# Patient Record
Sex: FEMALE | Race: WHITE | NOT HISPANIC OR LATINO | Employment: OTHER | URBAN - METROPOLITAN AREA
[De-identification: names, ages, dates, MRNs, and addresses within clinical notes are randomized per-mention and may not be internally consistent; named-entity substitution may affect disease eponyms.]

---

## 2017-06-23 ENCOUNTER — ALLSCRIPTS OFFICE VISIT (OUTPATIENT)
Dept: OTHER | Facility: OTHER | Age: 72
End: 2017-06-23

## 2017-06-23 DIAGNOSIS — R06.02 SHORTNESS OF BREATH: ICD-10-CM

## 2018-01-13 VITALS
SYSTOLIC BLOOD PRESSURE: 120 MMHG | OXYGEN SATURATION: 97 % | HEIGHT: 65 IN | DIASTOLIC BLOOD PRESSURE: 74 MMHG | HEART RATE: 101 BPM | BODY MASS INDEX: 22.87 KG/M2 | WEIGHT: 137.25 LBS

## 2018-04-16 ENCOUNTER — TELEPHONE (OUTPATIENT)
Dept: CARDIOLOGY CLINIC | Facility: CLINIC | Age: 73
End: 2018-04-16

## 2018-04-16 NOTE — TELEPHONE ENCOUNTER
PT WAS [facility] FOR A STRESS TEST IN JUNE OF 2017 & HAVENT DONE IT B/C SHE HAD A LOT OF HEALTH ISSUES BUT IS NOW READY TO TAKE THE TEST  DOES DR DAVE WANT TO SEE PT BEFORE HE RE-ORDERS THE TEST OR WAIT TIL AFTER SHE TAKES THE TEST?   PT HAS NEW INSUR  MAIL HANDLERS (Dewayne Smaller PLAN)  DJ#[id number]  PHONE #[phone]  PLEASE CALL PT TO GET THE TEST [facility] [address] ONCE ITS BEEN Everrett Vera

## 2018-04-27 RX ORDER — OXYBUTYNIN CHLORIDE 10 MG/1
2 TABLET, EXTENDED RELEASE ORAL
COMMUNITY
Start: 2014-01-28 | End: 2020-07-09 | Stop reason: SDUPTHER

## 2018-04-27 RX ORDER — METOPROLOL TARTRATE 50 MG/1
TABLET, FILM COATED ORAL
COMMUNITY
Start: 2014-01-28 | End: 2018-05-03 | Stop reason: SDUPTHER

## 2018-04-27 RX ORDER — CYCLOBENZAPRINE HCL 5 MG
5 TABLET ORAL
COMMUNITY
Start: 2014-01-28 | End: 2020-07-09 | Stop reason: SDUPTHER

## 2018-04-27 RX ORDER — LISINOPRIL 2.5 MG/1
1 TABLET ORAL DAILY
COMMUNITY
Start: 2014-01-28 | End: 2018-06-09 | Stop reason: SDUPTHER

## 2018-04-27 RX ORDER — NITROGLYCERIN 0.4 MG/1
TABLET SUBLINGUAL
COMMUNITY
Start: 2014-01-28 | End: 2021-11-17 | Stop reason: ALTCHOICE

## 2018-04-27 RX ORDER — DIGOXIN 125 MCG
1 TABLET ORAL DAILY
COMMUNITY
Start: 2015-03-02 | End: 2018-07-06 | Stop reason: SDUPTHER

## 2018-04-27 RX ORDER — MELOXICAM 15 MG/1
TABLET ORAL
COMMUNITY
End: 2019-12-12

## 2018-04-27 RX ORDER — LEVOTHYROXINE SODIUM 88 UG/1
TABLET ORAL DAILY
COMMUNITY
Start: 2014-01-28 | End: 2020-07-20 | Stop reason: SDUPTHER

## 2018-04-27 RX ORDER — LOTEPREDNOL ETABONATE 5 MG/ML
SUSPENSION/ DROPS OPHTHALMIC
COMMUNITY
End: 2021-11-17 | Stop reason: ALTCHOICE

## 2018-04-27 RX ORDER — PANTOPRAZOLE SODIUM 40 MG/1
1 TABLET, DELAYED RELEASE ORAL 2 TIMES DAILY
COMMUNITY
Start: 2014-01-28 | End: 2019-07-29 | Stop reason: SDUPTHER

## 2018-04-27 RX ORDER — LEVOCETIRIZINE DIHYDROCHLORIDE 5 MG/1
1 TABLET, FILM COATED ORAL
COMMUNITY
Start: 2014-01-28 | End: 2020-07-09 | Stop reason: SDUPTHER

## 2018-04-27 RX ORDER — DICYCLOMINE HYDROCHLORIDE 10 MG/1
1 CAPSULE ORAL EVERY 6 HOURS PRN
COMMUNITY
Start: 2017-06-23 | End: 2020-07-09 | Stop reason: SDUPTHER

## 2018-05-03 ENCOUNTER — OFFICE VISIT (OUTPATIENT)
Dept: CARDIOLOGY CLINIC | Facility: CLINIC | Age: 73
End: 2018-05-03
Payer: MEDICARE

## 2018-05-03 VITALS
HEIGHT: 65 IN | OXYGEN SATURATION: 97 % | DIASTOLIC BLOOD PRESSURE: 78 MMHG | SYSTOLIC BLOOD PRESSURE: 142 MMHG | WEIGHT: 136.4 LBS | HEART RATE: 110 BPM | BODY MASS INDEX: 22.73 KG/M2

## 2018-05-03 DIAGNOSIS — R07.9 CHEST PAIN, UNSPECIFIED TYPE: ICD-10-CM

## 2018-05-03 DIAGNOSIS — I48.20 CHRONIC A-FIB (HCC): Primary | ICD-10-CM

## 2018-05-03 DIAGNOSIS — I48.91 ATRIAL FIBRILLATION, UNSPECIFIED TYPE (HCC): Primary | ICD-10-CM

## 2018-05-03 DIAGNOSIS — I10 HYPERTENSION, UNSPECIFIED TYPE: ICD-10-CM

## 2018-05-03 DIAGNOSIS — I42.9 CARDIOMYOPATHY, UNSPECIFIED TYPE (HCC): ICD-10-CM

## 2018-05-03 DIAGNOSIS — E78.5 HYPERLIPIDEMIA, UNSPECIFIED HYPERLIPIDEMIA TYPE: ICD-10-CM

## 2018-05-03 PROCEDURE — 99214 OFFICE O/P EST MOD 30 MIN: CPT | Performed by: INTERNAL MEDICINE

## 2018-05-03 RX ORDER — METOPROLOL TARTRATE 50 MG/1
TABLET, FILM COATED ORAL
Qty: 90 TABLET | Refills: 5 | Status: SHIPPED | OUTPATIENT
Start: 2018-05-03 | End: 2018-10-28 | Stop reason: SDUPTHER

## 2018-05-03 RX ORDER — APIXABAN 5 MG/1
TABLET, FILM COATED ORAL
Qty: 60 TABLET | Refills: 3 | Status: SHIPPED | OUTPATIENT
Start: 2018-05-03 | End: 2018-09-30 | Stop reason: SDUPTHER

## 2018-05-03 RX ORDER — METOPROLOL TARTRATE 50 MG/1
50 TABLET, FILM COATED ORAL EVERY 12 HOURS SCHEDULED
Qty: 270 TABLET | Refills: 3 | Status: SHIPPED | OUTPATIENT
Start: 2018-05-03 | End: 2018-11-15

## 2018-05-03 RX ORDER — EZETIMIBE AND SIMVASTATIN 10; 20 MG/1; MG/1
1 TABLET ORAL
Refills: 0 | COMMUNITY
Start: 2018-04-12 | End: 2020-07-09 | Stop reason: SDUPTHER

## 2018-05-03 NOTE — PROGRESS NOTES
[facility] [address]  [facility] [address]  Cardiology Consultation     Raymundo Macias  [id number]  1945      1  Atrial fibrillation, unspecified type ([facility] )     2  Cardiomyopathy, unspecified type ([facility] )     3  Hypertension, unspecified type     4  Hyperlipidemia, unspecified hyperlipidemia type         Chief Complaint   Patient presents with    Follow-up       HPI:  Patient with history of A Fib on Eliquis, cardiomyopathy, HTN, HLD presents for follow up visit  Did not have stress test done  Denies chest pain, SOB, lightheadedness, leg swelling, syncope  HR elevated today  States that she occasionally gets palpitations  She also admits to fatigue and a dull chest pain, nonexertional  Compliant on medications  There is no problem list on file for this patient  Past Medical History:   Diagnosis Date    Atrial fibrillation ([facility] )     Cardiomyopathy ([facility] )     Diabetes mellitus ([facility] )     HTN (hypertension)     Hyperlipidemia     MVP (mitral valve prolapse)     SOB (shortness of breath)     Tachycardia      Social History     Social History    Marital status: /Civil Union     Spouse name: N/A    Number of children: N/A    Years of education: N/A     Occupational History    Not on file  Social History Main Topics    Smoking status: Never Smoker    Smokeless tobacco: Never Used    Alcohol use No    Drug use: Unknown    Sexual activity: Not on file     Other Topics Concern    Not on file     Social History Narrative    No narrative on file      History reviewed  No pertinent family history    Past Surgical History:   Procedure Laterality Date    WRIST SURGERY         Current Outpatient Prescriptions:     apixaban (ELIQUIS) 5 mg, Take 1 tablet by mouth 2 (two) times a day, Disp: , Rfl:     cyclobenzaprine (FLEXERIL) 5 mg tablet, Take by mouth, Disp: , Rfl:     dicyclomine (BENTYL) 10 mg capsule, Take 1 capsule by mouth every 6 (six) hours as needed, Disp: , Rfl:     digoxin (DIGITEK) 0 125 mg tablet, Take 1 tablet by mouth daily, Disp: , Rfl:     ezetimibe-simvastatin (VYTORIN) 10-20 mg per tablet, Take 1 tablet by mouth daily at bedtime  , Disp: , Rfl: 0    levocetirizine (XYZAL) 5 MG tablet, Take 1 tablet by mouth, Disp: , Rfl:     levothyroxine 88 mcg tablet, Take by mouth daily, Disp: , Rfl:     lisinopril (ZESTRIL) 2 5 mg tablet, Take 1 tablet by mouth daily, Disp: , Rfl:     metFORMIN (GLUCOPHAGE) 500 mg tablet, Take 2 tablets by mouth 2 (two) times a day  , Disp: , Rfl:     metoprolol tartrate (LOPRESSOR) 50 mg tablet, Take by mouth, Disp: , Rfl:     Multiple Vitamins-Minerals (CENTRUM SILVER PO), Take 1 tablet by mouth daily, Disp: , Rfl:     oxybutynin (DITROPAN-XL) 10 MG 24 hr tablet, Take 2 tablets by mouth, Disp: , Rfl:     pantoprazole (PROTONIX) 40 mg tablet, Take 1 tablet by mouth 2 (two) times a day, Disp: , Rfl:     diclofenac sodium (VOLTAREN) 1 %, Place on the skin, Disp: , Rfl:     Linaclotide (LINZESS) 145 MCG CAPS, Take by mouth every 8 (eight) hours, Disp: , Rfl:     loteprednol etabonate (LOTEMAX) 0 5 % ophthalmic suspension, Apply to eye, Disp: , Rfl:     meloxicam (MOBIC) 15 mg tablet, Take by mouth, Disp: , Rfl:     nitroglycerin (NITROSTAT) 0 4 mg SL tablet, Place under the tongue, Disp: , Rfl:     terconazole (TERAZOL 7) 0 4 % vaginal cream, Insert into the vagina, Disp: , Rfl:   Allergies   Allergen Reactions    Butoconazole     Moxifloxacin     Neomycin-Bacitracin Zn-Polymyx     Smz-Tmp Ds [Sulfamethoxazole-Trimethoprim]     Telithromycin Diarrhea     Vitals:    05/03/18 1338   BP: 142/78   Pulse: (!) 110   SpO2: 97%   Weight: 61 9 kg (136 lb 6 4 oz)   Height: 5' 5" (1 651 m)       Labs:  No visits with results within 2 Month(s) from this visit  Latest known visit with results is:   No results found for any previous visit  Imaging: No results found      Review of Systems:  Review of Systems Constitutional: Negative for diaphoresis, fatigue and fever  HENT: Negative for congestion, ear discharge, hearing loss, sinus pain and sore throat  Eyes: Negative for pain, redness and visual disturbance  Respiratory: Negative for cough, chest tightness, shortness of breath and wheezing  Cardiovascular: Positive for chest pain and palpitations  Negative for leg swelling  Gastrointestinal: Negative for abdominal distention, abdominal pain, constipation, diarrhea, nausea and vomiting  Endocrine: Negative for cold intolerance and heat intolerance  Genitourinary: Negative for difficulty urinating and hematuria  Musculoskeletal: Negative for arthralgias, back pain, gait problem, joint swelling, myalgias, neck pain and neck stiffness  Skin: Negative for color change, pallor, rash and wound  Neurological: Negative for dizziness, syncope, weakness, numbness and headaches  Hematological: Does not bruise/bleed easily  Psychiatric/Behavioral: Negative for agitation, behavioral problems and confusion  The patient is not nervous/anxious  /78   Pulse (!) 110   Ht 5' 5" (1 651 m)   Wt 61 9 kg (136 lb 6 4 oz)   SpO2 97%   BMI 22 70 kg/m²     Physical Exam:  Physical Exam   Constitutional: She is oriented to person, place, and time  She appears well-developed and well-nourished  HENT:   Head: Normocephalic and atraumatic  Eyes: Conjunctivae and EOM are normal  Pupils are equal, round, and reactive to light  Neck: Normal range of motion  Neck supple  Cardiovascular: Normal rate, regular rhythm, normal heart sounds and intact distal pulses  Exam reveals no gallop and no friction rub  No murmur heard  Pulmonary/Chest: Effort normal and breath sounds normal  No respiratory distress  She has no wheezes  She has no rales  She exhibits no tenderness  Abdominal: Soft  Bowel sounds are normal  She exhibits no distension  There is no rebound     Musculoskeletal: Normal range of motion  She exhibits no edema  Neurological: She is alert and oriented to person, place, and time  She has normal reflexes  Skin: Skin is warm and dry  Psychiatric: She has a normal mood and affect  Her behavior is normal  Judgment and thought content normal        Discussion/Summary:  1  Chest pain, fatigue:  -Will order stress test to r/o coronary ischemia   -Continue vytorin, lopressor  2  A Fib: On Digoxin and Lopressor for rate control  HR today 110  Advised patient to take 75 mg BID of lopressor (1 5 tablets twice a day)  On Eliquis for anticoagulation  3  Cardiomyopathy:  -ECHO in 2016 showed EF 55-65%, no regional wall motion abnormalities, mild mitral annular calcification, mild MR, mild TR, moderate fibrocalcific change of aorta  -Continue lisinopril, lopressor  Will increase lopressor dose as above (1 5 tablets twice a day)    4  HTN: Stable, continue present regimen  Will increase lopressor as above  5  HLD: Continue Vytorin    F/u 6 months

## 2018-05-07 ENCOUNTER — TELEPHONE (OUTPATIENT)
Dept: CARDIOLOGY CLINIC | Facility: CLINIC | Age: 73
End: 2018-05-07

## 2018-05-07 NOTE — TELEPHONE ENCOUNTER
Please advise, the metoprolol instructions first state take 1 tab q 12hrs then it say take 1 and 1/2 tabs bid  Which should she be taking?

## 2018-05-18 ENCOUNTER — HOSPITAL ENCOUNTER (OUTPATIENT)
Dept: NON INVASIVE DIAGNOSTICS | Facility: CLINIC | Age: 73
Discharge: HOME | End: 2018-05-18
Payer: MEDICARE

## 2018-05-18 DIAGNOSIS — R07.9 CHEST PAIN, UNSPECIFIED TYPE: ICD-10-CM

## 2018-05-18 LAB
MAX DIASTOLIC BP: 74 MMHG
MAX HEART RATE: 137 BPM
MAX PREDICTED HEART RATE: 148 BPM
MAX. SYSTOLIC BP: 136 MMHG
PROTOCOL NAME: NORMAL
REASON FOR TERMINATION: NORMAL
TARGET HR FORMULA: NORMAL
TEST INDICATION: NORMAL
TIME IN EXERCISE PHASE: NORMAL

## 2018-05-18 PROCEDURE — 78452 HT MUSCLE IMAGE SPECT MULT: CPT

## 2018-05-18 PROCEDURE — 93017 CV STRESS TEST TRACING ONLY: CPT

## 2018-05-18 PROCEDURE — A9502 TC99M TETROFOSMIN: HCPCS

## 2018-05-18 RX ORDER — AMINOPHYLLINE DIHYDRATE 25 MG/ML
50 INJECTION, SOLUTION INTRAVENOUS ONCE
Status: CANCELLED | OUTPATIENT
Start: 2018-05-18 | End: 2018-05-18

## 2018-05-18 RX ADMIN — REGADENOSON 0.4 MG: 0.08 INJECTION, SOLUTION INTRAVENOUS at 13:26

## 2018-05-21 ENCOUNTER — TELEPHONE (OUTPATIENT)
Dept: CARDIOLOGY CLINIC | Facility: CLINIC | Age: 73
End: 2018-05-21

## 2018-05-21 NOTE — TELEPHONE ENCOUNTER
----- Message from Hank Yates MD sent at 5/20/2018  9:16 PM EDT -----  Regarding: stress test  Please call patient to let her know that the stress test is overall good

## 2018-06-09 DIAGNOSIS — I10 HYPERTENSION, ESSENTIAL: Primary | ICD-10-CM

## 2018-06-09 RX ORDER — LISINOPRIL 2.5 MG/1
TABLET ORAL
Qty: 90 TABLET | Refills: 2 | Status: SHIPPED | OUTPATIENT
Start: 2018-06-09 | End: 2019-03-24 | Stop reason: SDUPTHER

## 2018-07-06 DIAGNOSIS — I48.20 CHRONIC A-FIB (HCC): Primary | ICD-10-CM

## 2018-07-06 RX ORDER — ROSUVASTATIN CALCIUM 10 MG/1
TABLET, FILM COATED ORAL
Qty: 90 TABLET | Refills: 3 | Status: SHIPPED | OUTPATIENT
Start: 2018-07-06 | End: 2019-06-22 | Stop reason: SDUPTHER

## 2018-09-30 DIAGNOSIS — I48.20 CHRONIC A-FIB (HCC): ICD-10-CM

## 2018-09-30 RX ORDER — APIXABAN 5 MG/1
TABLET, FILM COATED ORAL
Qty: 60 TABLET | Refills: 3 | Status: SHIPPED | OUTPATIENT
Start: 2018-09-30 | End: 2018-11-15 | Stop reason: SDUPTHER

## 2018-10-28 DIAGNOSIS — I48.20 CHRONIC A-FIB (HCC): ICD-10-CM

## 2018-10-29 RX ORDER — METOPROLOL TARTRATE 50 MG/1
TABLET, FILM COATED ORAL
Qty: 90 TABLET | Refills: 5 | Status: SHIPPED | OUTPATIENT
Start: 2018-10-29 | End: 2019-04-21 | Stop reason: SDUPTHER

## 2018-11-15 ENCOUNTER — OFFICE VISIT (OUTPATIENT)
Dept: CARDIOLOGY CLINIC | Facility: CLINIC | Age: 73
End: 2018-11-15
Payer: MEDICARE

## 2018-11-15 VITALS
OXYGEN SATURATION: 99 % | WEIGHT: 133.8 LBS | HEART RATE: 81 BPM | SYSTOLIC BLOOD PRESSURE: 118 MMHG | DIASTOLIC BLOOD PRESSURE: 70 MMHG | HEIGHT: 65 IN | BODY MASS INDEX: 22.29 KG/M2

## 2018-11-15 DIAGNOSIS — E78.2 MIXED HYPERLIPIDEMIA: ICD-10-CM

## 2018-11-15 DIAGNOSIS — I42.9 CARDIOMYOPATHY, UNSPECIFIED TYPE (HCC): ICD-10-CM

## 2018-11-15 DIAGNOSIS — I48.20 CHRONIC ATRIAL FIBRILLATION (HCC): Primary | ICD-10-CM

## 2018-11-15 DIAGNOSIS — I10 ESSENTIAL HYPERTENSION: ICD-10-CM

## 2018-11-15 DIAGNOSIS — I48.20 CHRONIC A-FIB (HCC): ICD-10-CM

## 2018-11-15 PROCEDURE — 99214 OFFICE O/P EST MOD 30 MIN: CPT | Performed by: INTERNAL MEDICINE

## 2018-11-15 NOTE — PROGRESS NOTES
[facility]  [facility] [address]  [facility] [address]  Cardiology Follow Up    Rocío Rivera  1945  [id number]      1  Chronic atrial fibrillation (HCC)     2  Cardiomyopathy, unspecified type ([facility] )     3  Essential hypertension     4  Mixed hyperlipidemia         Chief Complaint   Patient presents with    Follow-up    Shortness of Breath     After walking uphill         dizziness       Interval History:  Patient presents for follow-up visit  Patient has history of atrial fibrillation on anticoagulation  Patient does have occasional episodes of dizziness  Patient does have shortness of breath with exertion but this is unchanged from previous visit  Patient did have a pharmacological if nuclear stress test few months ago which was negative for ischemia with normal ejection fraction  Patient denies any bleeding issues  Patient also has history of anxiety  Patient has regular follow-up and a blood work through primary care physician  She states that she has been compliant with all her present medications  Patient Active Problem List   Diagnosis    Atrial fibrillation ([facility] )    Cardiomyopathy ([facility] )    Hypertension    Hyperlipidemia    Chest pain     Past Medical History:   Diagnosis Date    Atrial fibrillation ([facility] )     Cardiomyopathy ([facility] )     Diabetes mellitus ([facility] )     HTN (hypertension)     Hyperlipidemia     MVP (mitral valve prolapse)     SOB (shortness of breath)     Tachycardia      Social History     Social History    Marital status: /Civil Union     Spouse name: N/A    Number of children: N/A    Years of education: N/A     Occupational History    Not on file       Social History Main Topics    Smoking status: Never Smoker    Smokeless tobacco: Never Used    Alcohol use No    Drug use: Unknown    Sexual activity: Not on file     Other Topics Concern    Not on file     Social History Narrative    No narrative on file      Family History Problem Relation Age of Onset    Heart attack Mother     Diabetes Mother     Heart attack Father     Stomach cancer Sister      Past Surgical History:   Procedure Laterality Date    WRIST SURGERY         Current Outpatient Prescriptions:     cyclobenzaprine (FLEXERIL) 5 mg tablet, Take by mouth, Disp: , Rfl:     dicyclomine (BENTYL) 10 mg capsule, Take 1 capsule by mouth every 6 (six) hours as needed, Disp: , Rfl:     DIGITEK 125 MCG tablet, take 1 tablet by mouth once daily, Disp: 90 tablet, Rfl: 3    ELIQUIS 5 MG, take 1 tablet by mouth twice a day, Disp: 60 tablet, Rfl: 3    ezetimibe-simvastatin (VYTORIN) 10-20 mg per tablet, Take 1 tablet by mouth daily at bedtime  , Disp: , Rfl: 0    levocetirizine (XYZAL) 5 MG tablet, Take 1 tablet by mouth, Disp: , Rfl:     levothyroxine 88 mcg tablet, Take by mouth daily, Disp: , Rfl:     lisinopril (ZESTRIL) 2 5 mg tablet, take 1 tablet by mouth once daily, Disp: 90 tablet, Rfl: 2    loteprednol etabonate (LOTEMAX) 0 5 % ophthalmic suspension, Apply to eye, Disp: , Rfl:     meloxicam (MOBIC) 15 mg tablet, Take by mouth, Disp: , Rfl:     metFORMIN (GLUCOPHAGE) 500 mg tablet, Take 2 tablets by mouth 2 (two) times a day  , Disp: , Rfl:     metoprolol tartrate (LOPRESSOR) 50 mg tablet, take 1 and 1/2 tablets by mouth twice a day, Disp: 90 tablet, Rfl: 5    Multiple Vitamins-Minerals (CENTRUM SILVER PO), Take 1 tablet by mouth daily, Disp: , Rfl:     oxybutynin (DITROPAN-XL) 10 MG 24 hr tablet, Take 2 tablets by mouth, Disp: , Rfl:     pantoprazole (PROTONIX) 40 mg tablet, Take 1 tablet by mouth 2 (two) times a day, Disp: , Rfl:     diclofenac sodium (VOLTAREN) 1 %, Place on the skin, Disp: , Rfl:     Linaclotide (LINZESS) 145 MCG CAPS, Take by mouth every 8 (eight) hours, Disp: , Rfl:     nitroglycerin (NITROSTAT) 0 4 mg SL tablet, Place under the tongue, Disp: , Rfl:     terconazole (TERAZOL 7) 0 4 % vaginal cream, Insert into the vagina, Disp: , Rfl:   Allergies   Allergen Reactions    Butoconazole     Moxifloxacin     Neomycin-Bacitracin Zn-Polymyx     Smz-Tmp Ds [Sulfamethoxazole-Trimethoprim]     Telithromycin Diarrhea       Labs:  No visits with results within 2 Month(s) from this visit  Latest known visit with results is:   Hospital Outpatient Visit on 05/18/2018   Component Date Value    Protocol Name 05/18/2018 LEXISCAN-SIT     Time In Exercise Phase 05/18/2018 00:03:00     MAX  SYSTOLIC BP 64/03/0634 131     Max Diastolic Bp 50/92/1013 74     Max Heart Rate 05/18/2018 137     Max Predicted Heart Rate 05/18/2018 148     Reason for Termination 05/18/2018 Protocol Complete     Test Indication 05/18/2018 CHEST PAIN     Target Hr Formular 05/18/2018 (220 - Age)*85%      Imaging: No results found  Review of Systems:  Review of Systems   REVIEW OF SYSTEMS:  Constitutional:  Denies fever or chills   Eyes:  Denies change in visual acuity   HENT:  Denies nasal congestion or sore throat   Respiratory:  shortness of breath   Cardiovascular:  Denies chest pain or edema   GI:  Denies abdominal pain, nausea, vomiting, bloody stools or diarrhea   :  Denies dysuria, frequency, difficulty in micturition and nocturia  Musculoskeletal:  Denies back pain or joint pain   Neurologic:  Denies headache, focal weakness or sensory changes   Endocrine:  Denies polyuria or polydipsia   Lymphatic:  Denies swollen glands   Psychiatric: anxiety     Physical Exam:    /70   Pulse 81   Ht 5' 5" (1 651 m)   Wt 60 7 kg (133 lb 12 8 oz)   SpO2 99%   BMI 22 27 kg/m²     Physical Exam   PHYSICAL EXAM:  General:  Patient is not in acute distress   Head: Normocephalic, Atraumatic  HEENT:  Both pupils normal-size atraumatic, normocephalic, nonicteric  Neck:  JVP not raised  Trachea central  No carotid bruit  Respiratory:  normal breath sounds no crackles  no rhonchi  Cardiovascular:  Irregularly irregular  GI:  Abdomen soft nontender  No organomegaly  Lymphatic:  No cervical or inguinal lymphadenopathy  Neurologic:  Patient is awake alert, oriented   Grossly nonfocal    Discussion/Summary:  Patient with multiple medical problems who seems to be doing reasonably well from cardiac standpoint  Previous studies reviewed with patient  Medications reviewed and possible side effects discussed  concepts of cardiovascular disease , signs and symptoms of heart disease  Dietary and risk factor modification reinforced  All questions answered  Safety measures reviewed  Patient advised to report any problems prompting medical attention  Patient understands the risks and benefits of anticoagulation to prevent thromboembolic risk from atrial fibrillation  Patient report any bleeding issues  Results of pharmacological nuclear stress test patient had a few months ago was reviewed  Sensitivity and specificity of stress test also discussed  Symptoms to watch out from cardiac standpoint which would indicate the need for further cardiac evaluation including cardiac catheterization discussed with patient  Patient understands the same  Patient report any symptoms out of the ordinary  Follow-up with primary care physician  Followup in 6 months

## 2019-01-27 DIAGNOSIS — I48.20 CHRONIC A-FIB (HCC): ICD-10-CM

## 2019-01-28 RX ORDER — APIXABAN 5 MG/1
TABLET, FILM COATED ORAL
Qty: 60 TABLET | Refills: 3 | Status: SHIPPED | OUTPATIENT
Start: 2019-01-28 | End: 2019-05-16

## 2019-03-24 DIAGNOSIS — I10 HYPERTENSION, ESSENTIAL: ICD-10-CM

## 2019-03-24 RX ORDER — LISINOPRIL 2.5 MG/1
TABLET ORAL
Qty: 90 TABLET | Refills: 2 | Status: SHIPPED | OUTPATIENT
Start: 2019-03-24 | End: 2019-12-16 | Stop reason: SDUPTHER

## 2019-04-21 DIAGNOSIS — I48.20 CHRONIC A-FIB (HCC): ICD-10-CM

## 2019-04-21 RX ORDER — METOPROLOL TARTRATE 50 MG/1
TABLET, FILM COATED ORAL
Qty: 90 TABLET | Refills: 5 | Status: SHIPPED | OUTPATIENT
Start: 2019-04-21 | End: 2019-10-17 | Stop reason: SDUPTHER

## 2019-05-16 ENCOUNTER — OFFICE VISIT (OUTPATIENT)
Dept: CARDIOLOGY CLINIC | Facility: CLINIC | Age: 74
End: 2019-05-16
Payer: MEDICARE

## 2019-05-16 VITALS
HEART RATE: 81 BPM | BODY MASS INDEX: 21.46 KG/M2 | HEIGHT: 65 IN | OXYGEN SATURATION: 97 % | SYSTOLIC BLOOD PRESSURE: 130 MMHG | DIASTOLIC BLOOD PRESSURE: 80 MMHG | WEIGHT: 128.8 LBS

## 2019-05-16 DIAGNOSIS — I42.9 CARDIOMYOPATHY, UNSPECIFIED TYPE (HCC): ICD-10-CM

## 2019-05-16 DIAGNOSIS — I48.20 CHRONIC ATRIAL FIBRILLATION (HCC): Primary | ICD-10-CM

## 2019-05-16 DIAGNOSIS — E78.2 MIXED HYPERLIPIDEMIA: ICD-10-CM

## 2019-05-16 DIAGNOSIS — I10 ESSENTIAL HYPERTENSION: ICD-10-CM

## 2019-05-16 PROCEDURE — 99214 OFFICE O/P EST MOD 30 MIN: CPT | Performed by: INTERNAL MEDICINE

## 2019-05-16 RX ORDER — LORATADINE 10 MG/1
10 TABLET ORAL DAILY
COMMUNITY
End: 2019-12-12

## 2019-05-16 RX ORDER — SENNA PLUS 8.6 MG/1
1 TABLET ORAL DAILY
COMMUNITY

## 2019-05-22 DIAGNOSIS — I48.20 CHRONIC A-FIB (HCC): ICD-10-CM

## 2019-05-23 RX ORDER — APIXABAN 5 MG/1
TABLET, FILM COATED ORAL
Qty: 60 TABLET | Refills: 3 | Status: SHIPPED | OUTPATIENT
Start: 2019-05-23 | End: 2019-09-17 | Stop reason: SDUPTHER

## 2019-06-22 DIAGNOSIS — I48.20 CHRONIC A-FIB (HCC): ICD-10-CM

## 2019-06-23 RX ORDER — ROSUVASTATIN CALCIUM 10 MG/1
TABLET, FILM COATED ORAL
Qty: 90 TABLET | Refills: 3 | Status: SHIPPED | OUTPATIENT
Start: 2019-06-23 | End: 2020-07-13 | Stop reason: SDUPTHER

## 2019-07-25 ENCOUNTER — TELEPHONE (OUTPATIENT)
Dept: GASTROENTEROLOGY | Facility: CLINIC | Age: 74
End: 2019-07-25

## 2019-07-25 NOTE — TELEPHONE ENCOUNTER
Received prescription Refill request from [facility]  Spoke to Dasia Frost and she advised not to refill script for the Pantoprozole 40 mg and have her come in for follow-up ov  Called pt and informed her of the above   Will call back to schedule appt with Dasia Margot after she speaks to her

## 2019-07-29 DIAGNOSIS — R10.9 ABDOMINAL PAIN, UNSPECIFIED ABDOMINAL LOCATION: Primary | ICD-10-CM

## 2019-07-29 RX ORDER — PANTOPRAZOLE SODIUM 40 MG/1
40 TABLET, DELAYED RELEASE ORAL 2 TIMES DAILY
Qty: 180 TABLET | Refills: 3 | Status: SHIPPED | OUTPATIENT
Start: 2019-07-29 | End: 2020-07-09 | Stop reason: SDUPTHER

## 2019-07-29 NOTE — TELEPHONE ENCOUNTER
[address]  [facility] called [phone] - to get clarification on script for Protonix 40 mg 1 tablet 2 times daily   60 qty - See previous note

## 2019-07-29 NOTE — TELEPHONE ENCOUNTER
Pt of aaron       Pt requests a refill of protonix 1 daily #60 to be sent to  [facility]  In chart

## 2019-09-17 ENCOUNTER — OFFICE VISIT (OUTPATIENT)
Dept: DERMATOLOGY | Facility: CLINIC | Age: 74
End: 2019-09-17
Payer: MEDICARE

## 2019-09-17 DIAGNOSIS — L82.0 INFLAMED SEBORRHEIC KERATOSIS: ICD-10-CM

## 2019-09-17 DIAGNOSIS — Z13.89 SCREENING FOR SKIN CONDITION: ICD-10-CM

## 2019-09-17 DIAGNOSIS — I48.20 CHRONIC A-FIB (HCC): ICD-10-CM

## 2019-09-17 DIAGNOSIS — L82.1 SEBORRHEIC KERATOSIS: ICD-10-CM

## 2019-09-17 DIAGNOSIS — T14.8XXA EXCORIATION: Primary | ICD-10-CM

## 2019-09-17 PROCEDURE — 17110 DESTRUCTION B9 LES UP TO 14: CPT | Performed by: DERMATOLOGY

## 2019-09-17 PROCEDURE — 99203 OFFICE O/P NEW LOW 30 MIN: CPT | Performed by: DERMATOLOGY

## 2019-09-17 RX ORDER — APIXABAN 5 MG/1
TABLET, FILM COATED ORAL
Qty: 60 TABLET | Refills: 3 | Status: SHIPPED | OUTPATIENT
Start: 2019-09-17 | End: 2019-12-12

## 2019-09-17 RX ORDER — SPIRONOLACTONE 25 MG/1
TABLET ORAL
Refills: 0 | COMMUNITY
Start: 2019-08-22 | End: 2020-07-09 | Stop reason: SDUPTHER

## 2019-09-17 RX ORDER — SITAGLIPTIN 100 MG/1
TABLET, FILM COATED ORAL
Refills: 0 | COMMUNITY
Start: 2019-08-22 | End: 2020-07-09 | Stop reason: SDUPTHER

## 2019-09-17 NOTE — PROGRESS NOTES
[address]  [address] [facility] [address]  [phone]  [phone]     MRN: [id number] : 1945  Encounter: [id number]  Patient Information: Woo Connor  Chief complaint: skin lesion    History of present illness:  72-year-old female presents for concerns regarding a sore on her left nasal wall for about a month patient was seen by her primary care physician was given Bactroban on amoxicillin patient notes the area appears to be improving however she is concerned because she scheduled for cataract surgery next month  Patient denies any specific injury or anything that may have triggered this process but denies that there was any abnormality in the area prior to month ago  Patient also concerned regarding lesion on left back that gets irritated itches and burns  Past Medical History:   Diagnosis Date    At[facility] fibrillation [facility]     Cardiomyopathy [facility]     Diabetes mellitus ([facility]     HTN (hypertension)     Hyperlipidemia     MVP (mitral valve prolapse)     SOB (shortness of breath)     Tachycardia      Past Surgical History:   Procedure Laterality Date    WRIST SURGERY       Social History   Social History     Substance and Sexual Activity   Alcohol Use No     Social History     Substance and Sexual Activity   Drug Use Not on file     Social History     Tobacco Use   Smoking Status Never Smoker   Smokeless Tobacco Never Used     Family History   Problem Relation Age of Onset    Heart attack Mother     Diabetes Mother     Heart attack Father     Stomach cancer Sister      Meds/Allergies   Allergies   Allergen Reactions    Butoconazole     Moxifloxacin     Neomycin-Bacitracin Zn-Polymyx     Smz-Tmp Ds [Sulfamethoxazole-Trimethoprim]     Sulfa Antibiotics     Telithromycin Diarrhea       Meds:  Prior to Admission medications    Medication Sig Start Date End Date Taking?  Authorizing Provider   Cholecalciferol (VITAMIN D3 PO) Vitamin D3   Yes Historical Provider, MD   cyclobenzaprine (FLEXERIL) 5 mg tablet Take 5 mg by mouth daily at bedtime  1/28/14  Yes Historical Provider, MD   denosumab (PROLIA) 60 mg/mL Inject 60 mg under the skin every 6 (six) months   Yes Historical Provider, MD   dicyclomine (BENTYL) 10 mg capsule Take 1 capsule by mouth every 6 (six) hours as needed 6/23/17  Yes Historical Provider, MD   [place] 125 MCG tablet take 1 tablet by mouth once daily 6/23/19  Yes Jenni Campbell MD   ELIQUIS 5 MG take 1 tablet by mouth twice a day 5/23/19  Yes Cuong Delacruz PA-C   ezetimibe-simvastatin (VYTORIN) 10-20 mg per tablet Take 1 tablet by mouth daily at bedtime   4/12/18  Yes Historical Provider, MD   JANUVIA 100 MG tablet  8/22/19  Yes Historical Provider, MD   levocetirizine (XYZAL) 5 MG tablet Take 1 tablet by mouth 1/28/14  Yes Historical Provider, MD   levothyroxine 88 mcg tablet Take by mouth daily 1/28/14  Yes Historical Provider, MD   lisinopril (ZESTRIL) 2 5 mg tablet take 1 tablet by mouth once daily 3/24/19  Yes Jenni Campbell MD   loratadine (CLARITIN) 10 mg tablet Take 10 mg by mouth daily   Yes Historical Provider, MD   metFORMIN (GLUCOPHAGE) 500 mg tablet Take 2 tablets by mouth 2 (two) times a day   1/28/14  Yes Historical Provider, MD   metoprolol tartrate (LOPRESSOR) 50 mg tablet take 1 and 1/2 tablets by mouth twice a day 4/21/19  Yes Jenni Campbell MD   Multiple Vitamins-Minerals (CENTRUM SILVER PO) Take 1 tablet by mouth daily 1/28/14  Yes Historical Provider, MD   nitroglycerin (NITROSTAT) 0 4 mg SL tablet Place under the tongue 1/28/14  Yes Historical Provider, MD   ONDANSETRON PO Take by mouth as needed   Yes Historical Provider, MD   oxybutynin (DITROPAN-XL) 10 MG 24 hr tablet Take 2 tablets by mouth 1/28/14  Yes Historical Provider, MD   pantoprazole (PROTONIX) 40 mg tablet Take 1 tablet (40 mg total) by mouth 2 (two) times a day 7/29/19  Yes [place] (SENOKOT) 8 6 MG tablet Take 1 tablet by mouth daily   Yes Historical Provider, MD   spironolactone (ALDACTONE) 25 mg tablet  8/22/19  Yes Historical Provider, MD   apixaban (ELIQUIS) 5 mg Take 1 tablet (5 mg total) by mouth 2 (two) times a day  Patient not taking: Reported on 9/17/2019 11/15/18   Anna Grant MD   diclofenac sodium (VOLTAREN) 1 % Place on the skin    Historical Provider, MD   Linaclotide (LINZESS) 145 MCG CAPS Take by mouth every 8 (eight) hours    Historical Provider, MD   loteprednol etabonate (LOTEMAX) 0 5 % ophthalmic suspension Apply to eye    Historical Provider, MD   meloxicam (MOBIC) 15 mg tablet Take by mouth    Historical Provider, MD   terconazole (TERAZOL 7) 0 4 % vaginal cream Insert into the vagina    Historical Provider, MD       Subjective:     Review of Systems:    General: negative for - chills, fatigue, fever,  weight gain or weight loss  Psychological: negative for - anxiety, behavioral disorder, concentration difficulties, decreased libido, depression, irritability, memory difficulties, mood swings, sleep disturbances or suicidal ideation  ENT: negative for - hearing difficulties , nasal congestion, nasal discharge, oral lesions, sinus pain, sneezing, sore throat  Allergy and Immunology: negative for - hives, insect bite sensitivity,  Hematological and Lymphatic: negative for - bleeding problems, blood clots,bruising, swollen lymph nodes  Endocrine: negative for - hair pattern changes, hot flashes, malaise/lethargy, mood swings, palpitations, polydipsia/polyuria, skin changes, temperature intolerance or unexpected weight change  Respiratory: negative for - cough, hemoptysis, orthopnea, shortness of breath, or wheezing  Cardiovascular: negative for - chest pain, dyspnea on exertion, edema,  Gastrointestinal: negative for - abdominal pain, nausea/vomiting  Genito-Urinary: negative for - dysuria, incontinence, irregular/heavy menses or urinary frequency/urgency  Musculoskeletal: negative for - gait disturbance, joint pain, joint stiffness, joint swelling, muscle pain, muscular weakness  Dermatological:  As in HPI  Neurological: negative for confusion, dizziness, headaches, impaired coordination/balance, memory loss, numbness/tingling, seizures, speech problems, tremors or weakness       Objective: There were no vitals taken for this visit  Physical Exam:    General Appearance:    Alert, cooperative, no distress   Head:    Normocephalic, without obvious abnormality, atraumatic           Skin:   A full skin exam was performed including scalp, head scalp, eyes, ears, nose, lips, neck, chest, axilla, abdomen, back, buttocks, bilateral upper extremities, bilateral lower extremities, hands, feet, fingers, toes, fingernails, and toenails small erosion with erythema noted on the left nasal wall nothing else remarkable papules greasy stuck on appearance nothing else of concern noted  Cryotherapy Procedure Note    Pre-operative Diagnosis: inflamed seborrheic keratosis    Plan:  1  Instructed to keep the area dry and clean thereafter  Apply petrolatum if area gets crusty  2  Recommended that the patient use acetaminophen  as needed for pain  Locations:  Left flank    Indications: Destruction of irritated keratosis x1    Patient informed of risks (permanent scarring, infection, light or dark discoloration, bleeding, infection, weakness, numbness and recurrence of the lesion) and benefits of the procedure and verbal informed consent obtained  The areas are treated with liquid nitrogen therapy, frozen until ice ball extended 2 mm beyond lesion, allowed to thaw, and treated again  The patient tolerated procedure well  The patient was instructed on post-op care, warned that there may be blister formation, redness and pain  Recommend OTC analgesia as needed for pain  Condition:  Stable    Complications:  none  Assessment:     1  Excoriation     2  Seborrheic keratosis     3   Screening for skin condition     4  Inflamed seborrheic keratosis           Plan:   Excoriation rule out atypia at present will of allow the area to heal for another month the advised patient to use petrolatum and Vaseline if not heal completely advised that a biopsy is indicated  Inflamed keratosis lesion treated because the patient concern irritation  Seborrheic Keratosis  Patient reasurred these are normal growths we acquire with age no treatment needed  Screening for Dermatologic Disorders: Nothing else of concern noted on complete exam follow up in 1 year     Donato Moore MD  9/17/2019,11:57 AM    Portions of the record may have been created with voice recognition software   Occasional wrong word or "sound a like" substitutions may have occurred due to the inherent limitations of voice recognition software   Read the chart carefully and recognize, using context, where substitutions have occurred

## 2019-09-17 NOTE — PATIENT INSTRUCTIONS
Excoriation rule out atypia at present will of allow the area to heal for another month the advised patient to use petrolatum and Vaseline if not heal completely advised that a biopsy is indicated  Inflamed keratosis lesion treated because the patient concern irritation  Seborrheic Keratosis  Patient reasurred these are normal growths we acquire with age no treatment needed  Screening for Dermatologic Disorders: Nothing else of concern noted on complete exam follow up in 1 year   Treatment with Cryotherapy    The doctor has treated your skin with nitrogen, which is 320 degrees Fahrenheit below zero  He has given the treated area "frostbite "    Stinging should subside within a few hours  You can take Tylenol for pain, if needed  Over the next few days, it is normal if the area becomes reddened, a blood blister, or swollen with fluid  If the lesion treated was near the eye - you could get a swollen eye over the next few days  Do not panic! This is all temporary, and will resolve with time  There is no special treatment - just keep the area clean  Makeup and BandAids can be used, if preferred  When the area starts to dry up and peel off, using Vaseline can help healing  It usually takes up to a month for it to heal   Some lesions are recurrent and may require repeat treatments  If a lesion has not healed in one month, please don't hesitate to contact us  If you have any further questions that are not answered here, please call us  [phone]    Thank you for allowing us to care for you

## 2019-09-30 ENCOUNTER — TELEPHONE (OUTPATIENT)
Dept: CARDIOLOGY CLINIC | Facility: CLINIC | Age: 74
End: 2019-09-30

## 2019-09-30 NOTE — TELEPHONE ENCOUNTER
Pt called and stated that on 10/24 she will be having cataract surgery on her left eye and then on 11/7 on her right eye  Pt would like to know if there is any medication instructions? Also would like to inform Dr Samantha Woodward of surgery

## 2019-10-02 NOTE — TELEPHONE ENCOUNTER
S/w pt and she verbally understood per Dr Breanne Hughes she is to call her eye surgeon and see if he is okay with pt taking eliquis

## 2019-10-17 DIAGNOSIS — I48.20 CHRONIC A-FIB (HCC): ICD-10-CM

## 2019-10-17 RX ORDER — METOPROLOL TARTRATE 50 MG/1
75 TABLET, FILM COATED ORAL 2 TIMES DAILY
Qty: 180 TABLET | Refills: 3 | Status: SHIPPED | OUTPATIENT
Start: 2019-10-17 | End: 2020-07-13 | Stop reason: SDUPTHER

## 2019-12-12 ENCOUNTER — OFFICE VISIT (OUTPATIENT)
Dept: CARDIOLOGY CLINIC | Facility: CLINIC | Age: 74
End: 2019-12-12
Payer: MEDICARE

## 2019-12-12 VITALS
OXYGEN SATURATION: 99 % | WEIGHT: 129 LBS | HEIGHT: 65 IN | SYSTOLIC BLOOD PRESSURE: 118 MMHG | DIASTOLIC BLOOD PRESSURE: 70 MMHG | HEART RATE: 83 BPM | BODY MASS INDEX: 21.49 KG/M2

## 2019-12-12 DIAGNOSIS — I42.9 CARDIOMYOPATHY, UNSPECIFIED TYPE (HCC): ICD-10-CM

## 2019-12-12 DIAGNOSIS — Z79.01 CHRONIC ANTICOAGULATION: ICD-10-CM

## 2019-12-12 DIAGNOSIS — I48.20 CHRONIC ATRIAL FIBRILLATION (HCC): Primary | ICD-10-CM

## 2019-12-12 DIAGNOSIS — I10 ESSENTIAL HYPERTENSION: ICD-10-CM

## 2019-12-12 PROCEDURE — 99214 OFFICE O/P EST MOD 30 MIN: CPT | Performed by: INTERNAL MEDICINE

## 2019-12-12 NOTE — PROGRESS NOTES
[facility] [place] [id number] [facility] [place] [address]  Cardiology Follow Up    Damaso Cook  1945  [id number]      1  Chronic atrial fibrillation     2  Cardiomyopathy, unspecified type ([facility] )     3  Essential hypertension     4  Chronic anticoagulation         Chief Complaint   Patient presents with    Follow-up    Atrial Fibrillation       Interval History:  Patient presents for follow-up visit  Patient has history of cardiomyopathy, recovered  Patient also has history of atrial fibrillation on rate control on anticoagulation  Patient denies any bleeding issues  Patient does have some shortness of breath which is stable  No history of palpitations or dizziness  No history of orthopnea PND  She states that she has been compliant with all her present medications      Patient Active Problem List   Diagnosis    Atrial fibrillation ([place] [address] )    Cardiomyopathy ([place] [address] )    Hypertension    Hyperlipidemia    Chest pain     Past Medical History:   Diagnosis Date    Atrial fibrillation ([place] [address] )     Cardiomyopathy ([facility] )     Diabetes mellitus ([place] [address] )     HTN (hypertension)     Hyperlipidemia     MVP (mitral valve prolapse)     SOB (shortness of breath)     Tachycardia      Social History     Socioeconomic History    Marital status: /Civil Union     Spouse name: Not on file    Number of children: Not on file    Years of education: Not on file    Highest education level: Not on file   Occupational History    Not on file   Social Needs    Financial resource strain: Not on file    Food insecurity:     Worry: Not on file     Inability: Not on file    Transportation needs:     Medical: Not on file     Non-medical: Not on file   Tobacco Use    Smoking status: Never Smoker    Smokeless tobacco: Never Used   Substance and Sexual Activity    Alcohol use: No    Drug use: Not on file    Sexual activity: Not on file   Lifestyle    Physical activity:     Days per week: Not on file Minutes per session: Not on file    Stress: Not on file   Relationships    Social connections:     Talks on phone: Not on file     Gets together: Not on file     Attends [employer] service: Not on file     Active member of club or organization: Not on file     Attends meetings of clubs or organizations: Not on file     Relationship status: Not on file    Intimate partner violence:     Fear of current or ex partner: Not on file     Emotionally abused: Not on file     Physically abused: Not on file     Forced sexual activity: Not on file   Other Topics Concern    Not on file   Social History Narrative    Not on file      Family History   Problem Relation Age of Onset    Heart attack Mother     Diabetes Mother     Heart attack Father     Stomach cancer Sister      Past Surgical History:   Procedure Laterality Date    WRIST SURGERY         Current Outpatient Medications:     apixaban (ELIQUIS) 5 mg, Take 1 tablet (5 mg total) by mouth 2 (two) times a day, Disp: 180 tablet, Rfl: 3    Cholecalciferol (VITAMIN D3 PO), Vitamin D3, Disp: , Rfl:     cyclobenzaprine (FLEXERIL) 5 mg tablet, Take 5 mg by mouth daily at bedtime , Disp: , Rfl:     denosumab (PROLIA) 60 mg/mL, Inject 60 mg under the skin every 6 (six) months, Disp: , Rfl:     diclofenac sodium (VOLTAREN) 1 %, Place on the skin, Disp: , Rfl:     dicyclomine (BENTYL) 10 mg capsule, Take 1 capsule by mouth every 6 (six) hours as needed, Disp: , Rfl:     [place] 125 MCG tablet, take 1 tablet by mouth once daily, Disp: 90 tablet, Rfl: 3    ezetimibe-simvastatin (VYTORIN) 10-20 mg per tablet, Take 1 tablet by mouth daily at bedtime  , Disp: , Rfl: 0    JANUVIA 100 MG tablet, , Disp: , Rfl: 0    levocetirizine (XYZAL) 5 MG tablet, Take 1 tablet by mouth, Disp: , Rfl:     levothyroxine 88 mcg tablet, Take by mouth daily, Disp: , Rfl:     Linaclotide (LINZESS) 145 MCG CAPS, Take by mouth every 8 (eight) hours, Disp: , Rfl:     lisinopril (ZESTRIL) 2 5 mg tablet, take 1 tablet by mouth once daily, Disp: 90 tablet, Rfl: 2    loteprednol etabonate (LOTEMAX) 0 5 % ophthalmic suspension, Apply to eye, Disp: , Rfl:     metFORMIN (GLUCOPHAGE) 500 mg tablet, Take 2 tablets by mouth 2 (two) times a day  , Disp: , Rfl:     metoprolol tartrate (LOPRESSOR) 50 mg tablet, Take 1 5 tablets (75 mg total) by mouth 2 (two) times a day, Disp: 180 tablet, Rfl: 3    Multiple Vitamins-Minerals (CENTRUM SILVER PO), Take 1 tablet by mouth daily, Disp: , Rfl:     nitroglycerin (NITROSTAT) 0 4 mg SL tablet, Place under the tongue, Disp: , Rfl:     ONDANSETRON PO, Take by mouth as needed, Disp: , Rfl:     oxybutynin (DITROPAN-XL) 10 MG 24 hr tablet, Take 2 tablets by mouth, Disp: , Rfl:     pantoprazole (PROTONIX) 40 mg tablet, Take 1 tablet (40 mg total) by mouth 2 (two) times a day, Disp: 180 tablet, Rfl: 3    senna (SENOKOT) 8 6 MG tablet, Take 1 tablet by mouth daily, Disp: , Rfl:     spironolactone (ALDACTONE) 25 mg tablet, , Disp: , Rfl: 0    terconazole (TERAZOL 7) 0 4 % vaginal cream, Insert into the vagina, Disp: , Rfl:   Allergies   Allergen Reactions    Butoconazole     Moxifloxacin     Neomycin-Bacitracin Zn-Polymyx     Smz-Tmp Ds [Sulfamethoxazole-Trimethoprim]     Sulfa Antibiotics     Telithromycin Diarrhea       Labs:  No visits with results within 2 Month(s) from this visit  Latest known visit with results is:   Hospital Outpatient Visit on 05/18/2018   Component Date Value    Protocol Name 05/18/2018 LEXISCAN-SIT     Time In Exercise Phase 05/18/2018 00:03:00     MAX  SYSTOLIC BP 45/67/8201 060     Max Diastolic Bp 59/04/3695 74     Max Heart Rate 05/18/2018 137     Max Predicted Heart Rate 05/18/2018 148     Reason for Termination 05/18/2018 Protocol Complete     Test Indication 05/18/2018 CHEST PAIN     Target Hr Formular 05/18/2018 (220 - Age)*85%      Imaging: No results found      Review of Systems:  Review of Systems   REVIEW OF SYSTEMS:  Constitutional:  Denies fever or chills   Eyes:  Denies change in visual acuity   HENT:  Denies nasal congestion or sore throat   Respiratory:   shortness of breath   Cardiovascular:  Denies chest pain or edema   GI:  Denies abdominal pain, nausea, vomiting, bloody stools or diarrhea   :  Denies dysuria, frequency, difficulty in micturition and nocturia  Musculoskeletal:  Denies back pain or joint pain   Neurologic:  Denies headache, focal weakness or sensory changes   Endocrine:  Denies polyuria or polydipsia   Lymphatic:  Denies swollen glands   Psychiatric:  Denies depression or anxiety     Physical Exam:    /70   Pulse 83   Ht 5' 5" (1 651 m)   Wt 58 5 kg (129 lb)   SpO2 99%   BMI 21 47 kg/m²     Physical Exam   PHYSICAL EXAM:  General:  Patient is not in acute distress   Head: Normocephalic, Atraumatic  HEENT:  Both pupils normal-size atraumatic, normocephalic, nonicteric  Neck:  JVP not raised  Trachea central  No carotid bruit  Respiratory:  normal breath sounds no crackles  no rhonchi  Cardiovascular:  Irregularly irregular   GI:  Abdomen soft nontender  No organomegaly  Lymphatic:  No cervical or inguinal lymphadenopathy  Neurologic:  Patient is awake alert, oriented   Grossly nonfocal  Extremities no edema    Discussion/Summary:  Patient with multiple medical problems who seems to be doing reasonably well from cardiac standpoint  Previous studies reviewed with patient  Medications reviewed and possible side effects discussed  concepts of cardiovascular disease , signs and symptoms of heart disease  Dietary and risk factor modification reinforced  All questions answered  Safety measures reviewed  Patient advised to report any problems prompting medical attention  Patient understands the risks and benefits of anticoagulation to prevent thromboembolic risks  Patient report any bleeding issues    Patient will be scheduled for an echocardiogram to assess ejection fraction given history of cardiomyopathy in the past   Medications reviewed  Patient had a few questions which were answered  Follow-up in 6 months or earlier as needed  Follow-up with primary care physician  Patient is agreeable with the plan of care

## 2019-12-16 DIAGNOSIS — I10 HYPERTENSION, ESSENTIAL: ICD-10-CM

## 2019-12-16 RX ORDER — LISINOPRIL 2.5 MG/1
2.5 TABLET ORAL DAILY
Qty: 90 TABLET | Refills: 2 | Status: SHIPPED | OUTPATIENT
Start: 2019-12-16 | End: 2020-07-13 | Stop reason: SDUPTHER

## 2020-01-13 DIAGNOSIS — I48.20 CHRONIC A-FIB (HCC): ICD-10-CM

## 2020-01-16 DIAGNOSIS — I48.20 CHRONIC A-FIB (HCC): ICD-10-CM

## 2020-01-28 ENCOUNTER — APPOINTMENT (OUTPATIENT)
Dept: LAB | Facility: HOSPITAL | Age: 75
End: 2020-01-28
Attending: STUDENT IN AN ORGANIZED HEALTH CARE EDUCATION/TRAINING PROGRAM
Payer: MEDICARE

## 2020-01-28 ENCOUNTER — OFFICE VISIT (OUTPATIENT)
Dept: OBGYN CLINIC | Facility: CLINIC | Age: 75
End: 2020-01-28
Payer: MEDICARE

## 2020-01-28 VITALS — DIASTOLIC BLOOD PRESSURE: 84 MMHG | BODY MASS INDEX: 20.93 KG/M2 | WEIGHT: 125.8 LBS | SYSTOLIC BLOOD PRESSURE: 132 MMHG

## 2020-01-28 DIAGNOSIS — R10.2 PELVIC PAIN: ICD-10-CM

## 2020-01-28 DIAGNOSIS — Z12.31 ENCOUNTER FOR SCREENING MAMMOGRAM FOR MALIGNANT NEOPLASM OF BREAST: Primary | ICD-10-CM

## 2020-01-28 DIAGNOSIS — Z78.0 ASYMPTOMATIC POSTMENOPAUSAL ESTROGEN DEFICIENCY: ICD-10-CM

## 2020-01-28 PROBLEM — I73.9 PERIPHERAL VASCULAR DISEASE (HCC): Status: ACTIVE | Noted: 2020-01-28

## 2020-01-28 PROBLEM — E11.9 DIABETES MELLITUS (HCC): Status: ACTIVE | Noted: 2020-01-28

## 2020-01-28 PROBLEM — M19.90 ARTHRITIS: Status: ACTIVE | Noted: 2020-01-28

## 2020-01-28 PROBLEM — M25.473 ANKLE JOINT EFFUSION: Status: ACTIVE | Noted: 2020-01-28

## 2020-01-28 PROBLEM — G62.9 NEUROPATHY: Status: ACTIVE | Noted: 2020-01-28

## 2020-01-28 PROCEDURE — 88112 CYTOPATH CELL ENHANCE TECH: CPT | Performed by: PATHOLOGY

## 2020-01-28 PROCEDURE — 99203 OFFICE O/P NEW LOW 30 MIN: CPT | Performed by: STUDENT IN AN ORGANIZED HEALTH CARE EDUCATION/TRAINING PROGRAM

## 2020-01-28 NOTE — PROGRESS NOTES
Assessment/Plan:    Pelvic pain  69year old postmenopausal patient here for pelvic pain  Exam without prolapse, urethra erythematous with ulceration  Recommend evaluation with urology  Urinalysis, culture and cytology ordered  Pelvic US to further evaluate and rule out uterine/ovarian pathology, may need CTAP pending findings and completeness of evaluation  Return in 3 months for annual exam        Diagnoses and all orders for this visit:    Encounter for screening mammogram for malignant neoplasm of breast  -     Mammo screening bilateral w 3d & cad; Future    Asymptomatic postmenopausal estrogen deficiency  -     DXA bone density spine hip and pelvis; Future    Pelvic pain  -     US pelvis complete w transvaginal; Future  -     Urinalysis with microscopic  -     Cytology, urine; Future  -     Urine culture; Future  -     Ambulatory referral to Urology; Future    Other orders  -     Cancel: Mammo screening bilateral w cad; Future          Subjective:      Patient ID: Laura Chaves is a 69 y o  female  69year old postmenopausal patient who presents with pelvic pain/pressure  She reports for the last week or so noting pelvic pain that radiates to the left  It is dull and radiates to the left  It is very bothersome and keeps her from being able to do her normal activities like  after her dog  She was seen by urgent care a week ago and diagnosed with UTI and given Macrobid  She noted initial improvement but her symptoms returned, they gave her additional antibiotic which she has stopped taking as they weren't helping  She has life long problems with constipation and when without a BM requires 5 Sen[place]ot for BM 2 days later  She reports multiple colonoscopies that have been normal  She reports burning with urination and the onset of loss of bladder control at times  Other times she has normal control over urination   She went to a gyn last 2-3 years ago and felt very self conscious about her problems and the exam  Reports that visit was for feeling uncomfortable in the vaginal area and feeling some bumps on her vulva  She does not believe they told her any information or offered any additional evaluation  She had one   She reports two D&C in the past for "infections" which were in her 25s  She denies history of STI or abnormal pap  She is  but not sexually active at this time  The following portions of the patient's history were reviewed and updated as appropriate: allergies, current medications, past family history, past medical history, past social history, past surgical history and problem list     Review of Systems   Constitutional: Negative for chills, fever and unexpected weight change  Respiratory: Negative for shortness of breath  Cardiovascular: Negative for chest pain  Gastrointestinal: Positive for abdominal pain and constipation  Negative for diarrhea and nausea  Genitourinary: Positive for difficulty urinating, dysuria, frequency, pelvic pain and urgency  Negative for hematuria, vaginal bleeding, vaginal discharge and vaginal pain  Musculoskeletal: Negative for back pain  Objective:      /84   Wt 57 1 kg (125 lb 12 8 oz)   Breastfeeding No   BMI 20 93 kg/m²          Physical Exam   Constitutional: She is oriented to person, place, and time  She appears well-developed and well-nourished  HENT:   Head: Normocephalic and atraumatic  Eyes: Conjunctivae, EOM and lids are normal  Right eye exhibits no discharge and no exudate  Left eye exhibits no discharge and no exudate  No scleral icterus  Neck: Normal range of motion  Pulmonary/Chest: [place] normal  No accessory muscle usage  No tachypnea and no bradypnea  No respiratory distress  Abdominal: Soft  She exhibits no distension and no mass  There is no tenderness  There is no rebound and no guarding  Genitourinary: Rectum normal        There is lesion on the right labia   There is no rash, tenderness or injury on the right labia  There is lesion on the left labia  There is no rash, tenderness or injury on the left labia  Uterus is not enlarged, not fixed and not tender  Cervix exhibits no motion tenderness, no discharge and no friability  Right adnexum displays no mass, no tenderness and no fullness  Left adnexum displays no mass, no tenderness and no fullness  No erythema, tenderness or bleeding in the vagina  No foreign body in the vagina  No signs of injury around the vagina  No vaginal discharge found  Genitourinary Comments: Cervix and vagina notable for atrophy consistent with age  No appreciated gross pelvic organ prolapse  No signs of fistula, drainage or bleeding  Musculoskeletal:        Right hip: She exhibits normal range of motion  Left hip: She exhibits normal range of motion  Right knee: She exhibits normal range of motion  Left knee: She exhibits normal range of motion  Right ankle: She exhibits normal range of motion  Left ankle: She exhibits normal range of motion  Neurological: She is alert and oriented to person, place, and time  Psychiatric: Her behavior is normal  Judgment and thought content normal  Her mood appears anxious  Her affect is not angry and not labile  Her speech is tangential  Cognition and memory are normal  She does not exhibit a depressed mood

## 2020-01-28 NOTE — ASSESSMENT & PLAN NOTE
69year old postmenopausal patient here for pelvic pain  Exam without prolapse, urethra erythematous with ulceration  Recommend evaluation with urology  Urinalysis, culture and cytology ordered  Pelvic US to further evaluate and rule out uterine/ovarian pathology, may need CTAP pending findings and completeness of evaluation     Return in 3 months for annual exam

## 2020-01-29 ENCOUNTER — APPOINTMENT (OUTPATIENT)
Dept: LAB | Facility: HOSPITAL | Age: 75
End: 2020-01-29
Attending: STUDENT IN AN ORGANIZED HEALTH CARE EDUCATION/TRAINING PROGRAM
Payer: MEDICARE

## 2020-01-29 LAB
BACTERIA UR QL AUTO: ABNORMAL /HPF
BILIRUB UR QL STRIP: NEGATIVE
CLARITY UR: ABNORMAL
COLOR UR: YELLOW
GLUCOSE UR STRIP-MCNC: NEGATIVE MG/DL
HGB UR QL STRIP.AUTO: NEGATIVE
KETONES UR STRIP-MCNC: NEGATIVE MG/DL
LEUKOCYTE ESTERASE UR QL STRIP: ABNORMAL
NITRITE UR QL STRIP: NEGATIVE
NON-SQ EPI CELLS URNS QL MICRO: ABNORMAL /HPF
PH UR STRIP.AUTO: 7 [PH]
PROT UR STRIP-MCNC: NEGATIVE MG/DL
RBC #/AREA URNS AUTO: ABNORMAL /HPF
SP GR UR STRIP.AUTO: 1.01 (ref 1–1.03)
UROBILINOGEN UR QL STRIP.AUTO: 0.2 E.U./DL
WBC #/AREA URNS AUTO: ABNORMAL /HPF

## 2020-01-29 PROCEDURE — 81001 URINALYSIS AUTO W/SCOPE: CPT | Performed by: STUDENT IN AN ORGANIZED HEALTH CARE EDUCATION/TRAINING PROGRAM

## 2020-01-29 PROCEDURE — 87086 URINE CULTURE/COLONY COUNT: CPT

## 2020-01-29 PROCEDURE — 87181 SC STD AGAR DILUTION PER AGT: CPT

## 2020-01-29 PROCEDURE — 87077 CULTURE AEROBIC IDENTIFY: CPT

## 2020-01-29 PROCEDURE — 87186 SC STD MICRODIL/AGAR DIL: CPT

## 2020-01-30 ENCOUNTER — TELEPHONE (OUTPATIENT)
Dept: OBGYN CLINIC | Facility: CLINIC | Age: 75
End: 2020-01-30

## 2020-01-30 DIAGNOSIS — N30.00 ACUTE CYSTITIS WITHOUT HEMATURIA: Primary | ICD-10-CM

## 2020-01-30 RX ORDER — GRANULES FOR ORAL 3 G/1
3 POWDER ORAL ONCE
Qty: 3 G | Refills: 0 | Status: SHIPPED | OUTPATIENT
Start: 2020-01-30 | End: 2020-01-30

## 2020-01-30 NOTE — TELEPHONE ENCOUNTER
Please also check with lab they are doing susceptibilities and that fosfomycin is included so we can ensure appropriate treatment  Thank you!

## 2020-01-30 NOTE — TELEPHONE ENCOUNTER
I called lab - they are running  gram neg susceptibilities presently  Pt does not want to do u/s because she cannot hold urine - she was crying  I told her just do trans vag   Pt will take monural  She will also call urology for an appt

## 2020-01-30 NOTE — TELEPHONE ENCOUNTER
Please let patient know urine culture positive  Have sent single dose treatment  Please inquire if she has spoken with urology as I would like her to get in asap  Thanks!

## 2020-01-31 ENCOUNTER — TELEPHONE (OUTPATIENT)
Dept: OBGYN CLINIC | Facility: CLINIC | Age: 75
End: 2020-01-31

## 2020-01-31 ENCOUNTER — TELEPHONE (OUTPATIENT)
Dept: OBGYN CLINIC | Facility: MEDICAL CENTER | Age: 75
End: 2020-01-31

## 2020-01-31 NOTE — TELEPHONE ENCOUNTER
Is there a way for lab to do fosfomycin susceptibilities? Or do any of the current ones imply susceptibility to it?  Thank you

## 2020-01-31 NOTE — TELEPHONE ENCOUNTER
Pr juliane, called micro at [facility] re fosfomycin susceptibilities  Lm and they will return call on Monday  [facility], [address], [place]  [id number], [place]

## 2020-01-31 NOTE — TELEPHONE ENCOUNTER
Spoke with micro at [employer])  He will do today and it will be in the computer on Monday  Per Fabiola San, pt did not pu med yesterday, so she will be picking up med today and take

## 2020-02-01 LAB — BACTERIA UR CULT: ABNORMAL

## 2020-02-03 ENCOUNTER — TELEPHONE (OUTPATIENT)
Dept: OBGYN CLINIC | Facility: MEDICAL CENTER | Age: 75
End: 2020-02-03

## 2020-02-03 DIAGNOSIS — N30.00 ACUTE CYSTITIS WITHOUT HEMATURIA: Primary | ICD-10-CM

## 2020-02-03 RX ORDER — CEFPODOXIME PROXETIL 200 MG/1
200 TABLET, FILM COATED ORAL 2 TIMES DAILY
Qty: 20 TABLET | Refills: 0 | Status: SHIPPED | OUTPATIENT
Start: 2020-02-03 | End: 2020-02-13

## 2020-02-03 NOTE — TELEPHONE ENCOUNTER
There is no reliable susceptibility on enterobacterium per [facility], pharmacy  If there is a concern for pyelonephritis that would not be an option  Per [facility] day if patient has good renal function Cefpodoxime may be an option, if not IV antibiotics  If you any further questions you may reach him directly at  [phone]  Routed to Dr Ayla Giles

## 2020-02-03 NOTE — TELEPHONE ENCOUNTER
Can we find out if patient took fosfomycin dose  Eleazar recommends three doses taken every other day so additional dose will need to be sent to pharmacy but would like to know if/when she took first dose  If she has not begun therapy with fosfomycin will send cephalosporin for 7 day course  Will use cephalosporin if she has started and completes fosfomycin but has no resolution of symptom/UTI  Thank you

## 2020-02-03 NOTE — TELEPHONE ENCOUNTER
Patient has not started Fosfomycin as of yet as the pharmacy has not been able to get it from pharmacy  Patient also asking if she can just do transvaginal ultrasound as she has had a very bad experience having to have a full bladder for transabdominal  Advised we will be calling in a different antibiotic  Requests to be sent to [facility]

## 2020-02-04 ENCOUNTER — TELEPHONE (OUTPATIENT)
Dept: OBGYN CLINIC | Facility: CLINIC | Age: 75
End: 2020-02-04

## 2020-03-16 ENCOUNTER — TELEPHONE (OUTPATIENT)
Dept: CARDIOLOGY CLINIC | Facility: CLINIC | Age: 75
End: 2020-03-16

## 2020-04-03 NOTE — PROGRESS NOTES
Working over due bin  COVID-19  Mailing mammogram, us pelvis complete order to patients home address on file as a friendly reminder with the number to central scheduling and informing may schedule out till June 2020

## 2020-07-09 ENCOUNTER — OFFICE VISIT (OUTPATIENT)
Dept: FAMILY MEDICINE CLINIC | Facility: CLINIC | Age: 75
End: 2020-07-09
Payer: MEDICARE

## 2020-07-09 VITALS
HEIGHT: 64 IN | OXYGEN SATURATION: 98 % | TEMPERATURE: 97.5 F | BODY MASS INDEX: 22.88 KG/M2 | SYSTOLIC BLOOD PRESSURE: 124 MMHG | DIASTOLIC BLOOD PRESSURE: 62 MMHG | HEART RATE: 104 BPM | RESPIRATION RATE: 20 BRPM | WEIGHT: 134 LBS

## 2020-07-09 DIAGNOSIS — I48.91 ATRIAL FIBRILLATION, UNSPECIFIED TYPE (HCC): ICD-10-CM

## 2020-07-09 DIAGNOSIS — E11.9 TYPE 2 DIABETES MELLITUS WITHOUT COMPLICATION, WITHOUT LONG-TERM CURRENT USE OF INSULIN (HCC): Primary | ICD-10-CM

## 2020-07-09 DIAGNOSIS — I73.9 PERIPHERAL VASCULAR DISEASE (HCC): ICD-10-CM

## 2020-07-09 DIAGNOSIS — R11.2 NAUSEA AND VOMITING, INTRACTABILITY OF VOMITING NOT SPECIFIED, UNSPECIFIED VOMITING TYPE: ICD-10-CM

## 2020-07-09 DIAGNOSIS — I42.9 CARDIOMYOPATHY, UNSPECIFIED TYPE (HCC): ICD-10-CM

## 2020-07-09 DIAGNOSIS — I10 ESSENTIAL HYPERTENSION: ICD-10-CM

## 2020-07-09 DIAGNOSIS — E78.2 MIXED HYPERLIPIDEMIA: ICD-10-CM

## 2020-07-09 DIAGNOSIS — G62.9 NEUROPATHY: ICD-10-CM

## 2020-07-09 DIAGNOSIS — L65.9 ALOPECIA: ICD-10-CM

## 2020-07-09 DIAGNOSIS — M19.90 ARTHRITIS: ICD-10-CM

## 2020-07-09 DIAGNOSIS — E03.9 HYPOTHYROIDISM, UNSPECIFIED TYPE: ICD-10-CM

## 2020-07-09 DIAGNOSIS — M62.838 MUSCLE SPASM: ICD-10-CM

## 2020-07-09 DIAGNOSIS — N32.81 OAB (OVERACTIVE BLADDER): ICD-10-CM

## 2020-07-09 DIAGNOSIS — K58.9 IRRITABLE BOWEL SYNDROME WITHOUT DIARRHEA: ICD-10-CM

## 2020-07-09 DIAGNOSIS — M81.0 OSTEOPOROSIS, UNSPECIFIED OSTEOPOROSIS TYPE, UNSPECIFIED PATHOLOGICAL FRACTURE PRESENCE: ICD-10-CM

## 2020-07-09 DIAGNOSIS — J30.1 ALLERGIC RHINITIS DUE TO POLLEN, UNSPECIFIED SEASONALITY: ICD-10-CM

## 2020-07-09 DIAGNOSIS — R10.9 ABDOMINAL PAIN, UNSPECIFIED ABDOMINAL LOCATION: ICD-10-CM

## 2020-07-09 PROCEDURE — 1036F TOBACCO NON-USER: CPT | Performed by: FAMILY MEDICINE

## 2020-07-09 PROCEDURE — 3074F SYST BP LT 130 MM HG: CPT | Performed by: FAMILY MEDICINE

## 2020-07-09 PROCEDURE — 99214 OFFICE O/P EST MOD 30 MIN: CPT | Performed by: FAMILY MEDICINE

## 2020-07-09 PROCEDURE — 3008F BODY MASS INDEX DOCD: CPT | Performed by: FAMILY MEDICINE

## 2020-07-09 PROCEDURE — 96372 THER/PROPH/DIAG INJ SC/IM: CPT | Performed by: FAMILY MEDICINE

## 2020-07-09 PROCEDURE — 1160F RVW MEDS BY RX/DR IN RCRD: CPT | Performed by: FAMILY MEDICINE

## 2020-07-09 PROCEDURE — 3078F DIAST BP <80 MM HG: CPT | Performed by: FAMILY MEDICINE

## 2020-07-09 RX ORDER — SITAGLIPTIN 100 MG/1
100 TABLET, FILM COATED ORAL DAILY
Qty: 90 TABLET | Refills: 1 | Status: SHIPPED | OUTPATIENT
Start: 2020-07-09 | End: 2021-01-07

## 2020-07-09 RX ORDER — CYCLOBENZAPRINE HCL 10 MG
10 TABLET ORAL 3 TIMES DAILY PRN
Qty: 270 TABLET | Refills: 0 | Status: SHIPPED | OUTPATIENT
Start: 2020-07-09 | End: 2020-10-13

## 2020-07-09 RX ORDER — PANTOPRAZOLE SODIUM 40 MG/1
40 TABLET, DELAYED RELEASE ORAL 2 TIMES DAILY
Qty: 180 TABLET | Refills: 3 | Status: SHIPPED | OUTPATIENT
Start: 2020-07-09 | End: 2022-07-06

## 2020-07-09 RX ORDER — DICYCLOMINE HYDROCHLORIDE 10 MG/1
10 CAPSULE ORAL EVERY 6 HOURS PRN
Qty: 360 CAPSULE | Refills: 1 | Status: SHIPPED | OUTPATIENT
Start: 2020-07-09 | End: 2021-01-07

## 2020-07-09 RX ORDER — PANTOPRAZOLE SODIUM 40 MG/1
40 TABLET, DELAYED RELEASE ORAL 2 TIMES DAILY
Qty: 180 TABLET | Refills: 3 | Status: SHIPPED | OUTPATIENT
Start: 2020-07-09 | End: 2020-07-09 | Stop reason: SDUPTHER

## 2020-07-09 RX ORDER — EZETIMIBE AND SIMVASTATIN 10; 20 MG/1; MG/1
1 TABLET ORAL
Qty: 90 TABLET | Refills: 1 | Status: SHIPPED | OUTPATIENT
Start: 2020-07-09 | End: 2021-01-07

## 2020-07-09 RX ORDER — LEVOCETIRIZINE DIHYDROCHLORIDE 5 MG/1
5 TABLET, FILM COATED ORAL EVERY EVENING
Qty: 90 TABLET | Refills: 1 | Status: SHIPPED | OUTPATIENT
Start: 2020-07-09 | End: 2021-01-07

## 2020-07-09 RX ORDER — ONDANSETRON HYDROCHLORIDE 8 MG/1
4 TABLET, FILM COATED ORAL EVERY 8 HOURS PRN
Qty: 270 TABLET | Refills: 1 | Status: SHIPPED | OUTPATIENT
Start: 2020-07-09 | End: 2021-04-21 | Stop reason: SDUPTHER

## 2020-07-09 RX ORDER — OXYBUTYNIN CHLORIDE 10 MG/1
20 TABLET, EXTENDED RELEASE ORAL
Qty: 90 TABLET | Refills: 1 | Status: SHIPPED | OUTPATIENT
Start: 2020-07-09 | End: 2020-07-18

## 2020-07-09 RX ORDER — SPIRONOLACTONE 25 MG/1
25 TABLET ORAL DAILY
Qty: 90 TABLET | Refills: 1 | Status: SHIPPED | OUTPATIENT
Start: 2020-07-09 | End: 2021-01-07

## 2020-07-09 NOTE — PROGRESS NOTES
Assessment/Plan:         Problem List Items Addressed This Visit     None        Patient's shoes and socks removed  Right Foot/Ankle   Right Foot Inspection  Skin Exam: skin normal, skin intact, dry skin and warmth no callus, no erythema, no maceration, no abnormal color, no pre-ulcer, no ulcer and no callus                          Toe Exam: ROM and strength within normal limitsno tenderness  Sensory   Vibration: intact  Proprioception: intact   Monofilament testing: intact  Vascular  Capillary refills: < 3 seconds  The right DP pulse is 2+  The right PT pulse is 2+  Left Foot/Ankle  Left Foot Inspection  Skin Exam: skin intact, dry skin and warmthno erythema, no maceration and normal color                         Toe Exam: no swelling and no tenderness                   Sensory   Vibration: intact  Proprioception: intact  Monofilament: intact  Vascular  Capillary refills: < 3 seconds  The left DP pulse is 2+  The left PT pulse is 2+  Assign Risk Category:  No deformity present; No loss of protective sensation; No weak pulses       Risk: 0      Subjective:      Patient ID: Najma Gallardo is a 77 y o  female  Pt here for cehckup on NIDDM, osteoporosis, IBS, GERD, hypothyroidsimand  eeds her prolia  Shot today  Pt had  Labs       The following portions of the patient's history were reviewed and updated as appropriate:   She has a past medical history of Anxiety disorder, Atrial fibrillation ([address] ), Cardiac arrhythmia, Cardiomyopathy ([address] ), Colitis, Diabetes mellitus ([address] ), Dry eyes, Dry mouth, HTN (hypertension), Hyperlipidemia, IBS (irritable bowel syndrome), MVP (mitral valve prolapse), Osteoporosis, SOB (shortness of breath), and Tachycardia ,  does not have any pertinent problems on file  ,   has a past surgical history that includes Wrist surgery and Dilation and curettage of uterus  ,  family history includes Diabetes in her mother; Heart attack in her father and mother; Stomach cancer in her sister  ,   reports that she has never smoked  She has never used smokeless tobacco  She reports that she does not drink alcohol or use drugs  ,  is allergic to butoconazole; moxifloxacin; neomycin-bacitracin zn-polymyx; smz-tmp ds [sulfamethoxazole-trimethoprim]; sulfa antibiotics; and telithromycin     Current Outpatient Medications   Medication Sig Dispense Refill    apixaban (ELIQUIS) 5 mg Take 1 tablet (5 mg total) by mouth 2 (two) times a day 180 tablet 3    Cholecalciferol (VITAMIN D3 PO) Vitamin D3      cyclobenzaprine (FLEXERIL) 5 mg tablet Take 5 mg by mouth daily at bedtime       denosumab (PROLIA) 60 mg/mL Inject 60 mg under the skin every 6 (six) months      dicyclomine (BENTYL) 10 mg capsule Take 1 capsule by mouth every 6 (six) hours as needed      DIGITEK 125 MCG tablet take 1 tablet by mouth once daily 90 tablet 3    ezetimibe-simvastatin (VYTORIN) 10-20 mg per tablet Take 1 tablet by mouth daily at bedtime    0    JANUVIA 100 MG tablet   0    levocetirizine (XYZAL) 5 MG tablet Take 1 tablet by mouth      levothyroxine 88 mcg tablet Take by mouth daily      lisinopril (ZESTRIL) 2 5 mg tablet Take 1 tablet (2 5 mg total) by mouth daily 90 tablet 2    metFORMIN (GLUCOPHAGE) 500 mg tablet Take 2 tablets by mouth 2 (two) times a day        metoprolol tartrate (LOPRESSOR) 50 mg tablet Take 1 5 tablets (75 mg total) by mouth 2 (two) times a day 180 tablet 3    Multiple Vitamins-Minerals (CENTRUM SILVER PO) Take 1 tablet by mouth daily      nitroglycerin (NITROSTAT) 0 4 mg SL tablet Place under the tongue      ONDANSETRON PO Take by mouth as needed      oxybutynin (DITROPAN-XL) 10 MG 24 hr tablet Take 2 tablets by mouth      pantoprazole (PROTONIX) 40 mg tablet Take 1 tablet (40 mg total) by mouth 2 (two) times a day 180 tablet 3    spironolactone (ALDACTONE) 25 mg tablet   0    diclofenac sodium (VOLTAREN) 1 % Place on the skin      Linaclotide (LINZESS) 145 MCG CAPS Take by mouth every 8 (eight) hours      loteprednol etabonate (LOTEMAX) 0 5 % ophthalmic suspension Apply to eye      senna (SENOKOT) 8 6 MG tablet Take 1 tablet by mouth daily      terconazole (TERAZOL 7) 0 4 % vaginal cream Insert into the vagina       No current facility-administered medications for this visit  Review of Systems   Constitutional: Negative for activity change, appetite change, fatigue and fever  HENT: Positive for ear pain  Negative for congestion, postnasal drip, rhinorrhea, sinus pressure, sinus pain, sneezing and sore throat  Eyes: Negative for pain and redness  Respiratory: Negative for apnea, cough, chest tightness, shortness of breath and wheezing  Cardiovascular: Negative for chest pain, palpitations and leg swelling  Gastrointestinal: Negative for abdominal pain, constipation, diarrhea, nausea and vomiting  Endocrine: Negative for cold intolerance and heat intolerance  Genitourinary: Negative for difficulty urinating, dysuria, frequency, hematuria and urgency  Musculoskeletal: Negative for arthralgias, back pain, gait problem and myalgias  Skin: Negative for rash  Neurological: Negative for dizziness, speech difficulty, weakness, numbness and headaches  Hematological: Does not bruise/bleed easily  Psychiatric/Behavioral: Negative for agitation, confusion and hallucinations  Objective:  Vitals:    07/09/20 1415   BP: 124/62   BP Location: Left arm   Patient Position: Sitting   Cuff Size: Standard   Pulse: 104   Resp: 20   Temp: 97 5 °F (36 4 °C)   TempSrc: Temporal   SpO2: 98%   Weight: 60 8 kg (134 lb)   Height: 5' 3 5" (1 613 m)     Body mass index is 23 36 kg/m²  Physical Exam   Constitutional: She is oriented to person, place, and time  She appears well-developed and well-nourished  HENT:   Head: Normocephalic and atraumatic  Nose: Nose normal    Eyes: Pupils are equal, round, and reactive to light  Conjunctivae and EOM are normal  No scleral icterus  Neck: Normal range of motion  Neck supple  No thyromegaly present  Cardiovascular: Normal rate, regular rhythm, normal heart sounds and intact distal pulses  Pulses are no weak pulses  Pulses:       Dorsalis pedis pulses are 2+ on the right side, and 2+ on the left side  Posterior tibial pulses are 2+ on the right side, and 2+ on the left side  Pulmonary/Chest: [place] normal and breath sounds normal  She has no wheezes  Abdominal: Soft  Bowel sounds are normal  She exhibits no distension  There is no tenderness  There is no rebound and no guarding  Musculoskeletal: Normal range of motion  She exhibits no edema, tenderness or deformity  Feet:   Right Foot:   Skin Integrity: Positive for warmth and dry skin  Negative for ulcer, skin breakdown, erythema or callus  Left Foot:   Skin Integrity: Positive for warmth and dry skin  Negative for skin breakdown or erythema  Neurological: She is alert and oriented to person, place, and time  No sensory deficit  Skin: Skin is warm and dry  No rash noted  No erythema  Psychiatric: She has a normal mood and affect  Her behavior is normal  Judgment and thought content normal    Nursing note and vitals reviewed

## 2020-07-13 ENCOUNTER — TELEPHONE (OUTPATIENT)
Dept: FAMILY MEDICINE CLINIC | Facility: CLINIC | Age: 75
End: 2020-07-13

## 2020-07-13 ENCOUNTER — TELEPHONE (OUTPATIENT)
Dept: CARDIOLOGY CLINIC | Facility: CLINIC | Age: 75
End: 2020-07-13

## 2020-07-13 DIAGNOSIS — E03.9 HYPOTHYROIDISM, UNSPECIFIED TYPE: Primary | ICD-10-CM

## 2020-07-13 DIAGNOSIS — I48.20 CHRONIC A-FIB (HCC): ICD-10-CM

## 2020-07-13 DIAGNOSIS — I10 HYPERTENSION, ESSENTIAL: ICD-10-CM

## 2020-07-13 RX ORDER — LEVOTHYROXINE SODIUM 88 UG/1
44 TABLET ORAL DAILY
Qty: 45 TABLET | Refills: 0 | Status: CANCELLED | OUTPATIENT
Start: 2020-07-13

## 2020-07-13 RX ORDER — METOPROLOL TARTRATE 50 MG/1
75 TABLET, FILM COATED ORAL 2 TIMES DAILY
Qty: 180 TABLET | Refills: 3 | Status: SHIPPED | OUTPATIENT
Start: 2020-07-13 | End: 2020-07-13

## 2020-07-13 RX ORDER — METOPROLOL TARTRATE 50 MG/1
TABLET, FILM COATED ORAL
Qty: 180 TABLET | Refills: 3 | Status: SHIPPED | OUTPATIENT
Start: 2020-07-13 | End: 2020-12-01

## 2020-07-13 RX ORDER — LISINOPRIL 2.5 MG/1
2.5 TABLET ORAL DAILY
Qty: 90 TABLET | Refills: 2 | Status: SHIPPED | OUTPATIENT
Start: 2020-07-13 | End: 2021-04-10

## 2020-07-13 RX ORDER — DIGOXIN 125 MCG
125 TABLET ORAL DAILY
Qty: 90 TABLET | Refills: 3 | Status: SHIPPED | OUTPATIENT
Start: 2020-07-13 | End: 2021-07-07

## 2020-07-13 NOTE — TELEPHONE ENCOUNTER
Patient needs refill on her   levothyroxine 88 mcg tablet Daily     Pt takes 1/2 tablet by mouth daily==please call into   [facility] [place]   Thank you [address]

## 2020-07-17 DIAGNOSIS — N32.81 OAB (OVERACTIVE BLADDER): ICD-10-CM

## 2020-07-18 RX ORDER — OXYBUTYNIN CHLORIDE 10 MG/1
20 TABLET, EXTENDED RELEASE ORAL
Qty: 90 TABLET | Refills: 1 | Status: SHIPPED | OUTPATIENT
Start: 2020-07-18 | End: 2020-10-23

## 2020-07-20 DIAGNOSIS — E03.9 HYPOTHYROIDISM, UNSPECIFIED TYPE: Primary | ICD-10-CM

## 2020-07-20 RX ORDER — LEVOTHYROXINE SODIUM 88 UG/1
88 TABLET ORAL DAILY
Qty: 90 TABLET | Refills: 0 | Status: SHIPPED | OUTPATIENT
Start: 2020-07-20 | End: 2020-10-13

## 2020-08-13 ENCOUNTER — APPOINTMENT (EMERGENCY)
Dept: CT IMAGING | Facility: HOSPITAL | Age: 75
End: 2020-08-13
Payer: MEDICARE

## 2020-08-13 ENCOUNTER — HOSPITAL ENCOUNTER (EMERGENCY)
Facility: HOSPITAL | Age: 75
Discharge: HOME | End: 2020-08-13
Attending: EMERGENCY MEDICINE
Payer: MEDICARE

## 2020-08-13 VITALS
BODY MASS INDEX: 22.88 KG/M2 | HEIGHT: 64 IN | OXYGEN SATURATION: 97 % | RESPIRATION RATE: 28 BRPM | TEMPERATURE: 98.6 F | DIASTOLIC BLOOD PRESSURE: 83 MMHG | HEART RATE: 136 BPM | WEIGHT: 134.04 LBS | SYSTOLIC BLOOD PRESSURE: 167 MMHG

## 2020-08-13 DIAGNOSIS — M54.42 ACUTE LEFT-SIDED LOW BACK PAIN WITH LEFT-SIDED SCIATICA: Primary | ICD-10-CM

## 2020-08-13 LAB
ANION GAP SERPL CALCULATED.3IONS-SCNC: 10 MMOL/L (ref 4–13)
BACTERIA UR QL AUTO: ABNORMAL /HPF
BASOPHILS # BLD AUTO: 0.04 THOUSANDS/ΜL (ref 0–0.1)
BASOPHILS NFR BLD AUTO: 1 % (ref 0–1)
BILIRUB UR QL STRIP: NEGATIVE
BUN SERPL-MCNC: 26 MG/DL (ref 5–25)
CALCIUM SERPL-MCNC: 9.1 MG/DL (ref 8.3–10.1)
CHLORIDE SERPL-SCNC: 99 MMOL/L (ref 100–108)
CLARITY UR: ABNORMAL
CO2 SERPL-SCNC: 28 MMOL/L (ref 21–32)
COLOR UR: YELLOW
CREAT SERPL-MCNC: 1.39 MG/DL (ref 0.6–1.3)
EOSINOPHIL # BLD AUTO: 0.07 THOUSAND/ΜL (ref 0–0.61)
EOSINOPHIL NFR BLD AUTO: 1 % (ref 0–6)
ERYTHROCYTE [DISTWIDTH] IN BLOOD BY AUTOMATED COUNT: 12.7 % (ref 11.6–15.1)
GFR SERPL CREATININE-BSD FRML MDRD: 37 ML/MIN/1.73SQ M
GLUCOSE SERPL-MCNC: 175 MG/DL (ref 65–140)
GLUCOSE UR STRIP-MCNC: NEGATIVE MG/DL
HCT VFR BLD AUTO: 38.9 % (ref 34.8–46.1)
HGB BLD-MCNC: 13.1 G/DL (ref 11.5–15.4)
HGB UR QL STRIP.AUTO: ABNORMAL
IMM GRANULOCYTES # BLD AUTO: 0.02 THOUSAND/UL (ref 0–0.2)
IMM GRANULOCYTES NFR BLD AUTO: 0 % (ref 0–2)
KETONES UR STRIP-MCNC: NEGATIVE MG/DL
LACTATE SERPL-SCNC: 2.7 MMOL/L (ref 0.5–2)
LEUKOCYTE ESTERASE UR QL STRIP: ABNORMAL
LYMPHOCYTES # BLD AUTO: 2.01 THOUSANDS/ΜL (ref 0.6–4.47)
LYMPHOCYTES NFR BLD AUTO: 33 % (ref 14–44)
MCH RBC QN AUTO: 33.1 PG (ref 26.8–34.3)
MCHC RBC AUTO-ENTMCNC: 33.7 G/DL (ref 31.4–37.4)
MCV RBC AUTO: 98 FL (ref 82–98)
MONOCYTES # BLD AUTO: 0.62 THOUSAND/ΜL (ref 0.17–1.22)
MONOCYTES NFR BLD AUTO: 10 % (ref 4–12)
MUCOUS THREADS UR QL AUTO: ABNORMAL
NEUTROPHILS # BLD AUTO: 3.41 THOUSANDS/ΜL (ref 1.85–7.62)
NEUTS SEG NFR BLD AUTO: 55 % (ref 43–75)
NITRITE UR QL STRIP: NEGATIVE
NON-SQ EPI CELLS URNS QL MICRO: ABNORMAL /HPF
NRBC BLD AUTO-RTO: 0 /100 WBCS
OTHER STN SPEC: ABNORMAL
PH UR STRIP.AUTO: 7 [PH]
PLATELET # BLD AUTO: 219 THOUSANDS/UL (ref 149–390)
PMV BLD AUTO: 10.1 FL (ref 8.9–12.7)
POTASSIUM SERPL-SCNC: 4.4 MMOL/L (ref 3.5–5.3)
PROT UR STRIP-MCNC: ABNORMAL MG/DL
RBC # BLD AUTO: 3.96 MILLION/UL (ref 3.81–5.12)
RBC #/AREA URNS AUTO: ABNORMAL /HPF
SODIUM SERPL-SCNC: 137 MMOL/L (ref 136–145)
SP GR UR STRIP.AUTO: 1.01 (ref 1–1.03)
TROPONIN I SERPL-MCNC: <0.02 NG/ML
UROBILINOGEN UR QL STRIP.AUTO: 0.2 E.U./DL
WBC # BLD AUTO: 6.17 THOUSAND/UL (ref 4.31–10.16)
WBC #/AREA URNS AUTO: ABNORMAL /HPF

## 2020-08-13 PROCEDURE — 74176 CT ABD & PELVIS W/O CONTRAST: CPT

## 2020-08-13 PROCEDURE — 81001 URINALYSIS AUTO W/SCOPE: CPT | Performed by: EMERGENCY MEDICINE

## 2020-08-13 PROCEDURE — 96374 THER/PROPH/DIAG INJ IV PUSH: CPT

## 2020-08-13 PROCEDURE — 80048 BASIC METABOLIC PNL TOTAL CA: CPT | Performed by: EMERGENCY MEDICINE

## 2020-08-13 PROCEDURE — 36415 COLL VENOUS BLD VENIPUNCTURE: CPT | Performed by: EMERGENCY MEDICINE

## 2020-08-13 PROCEDURE — G1004 CDSM NDSC: HCPCS

## 2020-08-13 PROCEDURE — 83605 ASSAY OF LACTIC ACID: CPT | Performed by: EMERGENCY MEDICINE

## 2020-08-13 PROCEDURE — 84484 ASSAY OF TROPONIN QUANT: CPT | Performed by: EMERGENCY MEDICINE

## 2020-08-13 PROCEDURE — 99285 EMERGENCY DEPT VISIT HI MDM: CPT | Performed by: EMERGENCY MEDICINE

## 2020-08-13 PROCEDURE — 99284 EMERGENCY DEPT VISIT MOD MDM: CPT

## 2020-08-13 PROCEDURE — 93005 ELECTROCARDIOGRAM TRACING: CPT

## 2020-08-13 PROCEDURE — 85025 COMPLETE CBC W/AUTO DIFF WBC: CPT | Performed by: EMERGENCY MEDICINE

## 2020-08-13 RX ORDER — OXYCODONE HYDROCHLORIDE AND ACETAMINOPHEN 5; 325 MG/1; MG/1
1 TABLET ORAL EVERY 4 HOURS PRN
Qty: 15 TABLET | Refills: 0 | Status: SHIPPED | OUTPATIENT
Start: 2020-08-13 | End: 2021-07-15 | Stop reason: ALTCHOICE

## 2020-08-13 RX ORDER — MORPHINE SULFATE 4 MG/ML
4 INJECTION, SOLUTION INTRAMUSCULAR; INTRAVENOUS ONCE
Status: COMPLETED | OUTPATIENT
Start: 2020-08-13 | End: 2020-08-13

## 2020-08-13 RX ADMIN — MORPHINE SULFATE 4 MG: 4 INJECTION INTRAVENOUS at 13:34

## 2020-08-13 NOTE — ED NOTES
Patient transported to [address] [place] [address], [facility]  08/13/20 1093

## 2020-08-13 NOTE — ED PROVIDER NOTES
History  Chief Complaint   Patient presents with    Back Pain     pt with left sided back pain that goes into her left hip and left leg      76 yo female who presents to ED for evaluation of sudden onset L flank pain, moderate, aching, constant, no aggravating or alleviating factors, unalleviated by PO flexeril  Occurs in context of osteoporosis  No associated trauma  No fever, chills, nausea, vomiting or weakness  No urinary sxs  No cough, cp or sob  Currently symptomatic  Prior to Admission Medications   Prescriptions Last Dose Informant Patient Reported? Taking?    Cholecalciferol (VITAMIN D3 PO)   Yes No   Sig: Vitamin D3   JANUVIA 100 MG tablet   No No   Sig: Take 1 tablet (100 mg total) by mouth daily   Linaclotide (LINZESS) 145 MCG CAPS  Self Yes No   Sig: Take by mouth every 8 (eight) hours   Multiple Vitamins-Minerals (CENTRUM SILVER PO)  Self Yes No   Sig: Take 1 tablet by mouth daily   ONDANSETRON PO  Self Yes No   Sig: Take by mouth as needed   apixaban (Eliquis) 5 mg   No No   Sig: Take 1 tablet (5 mg total) by mouth 2 (two) times a day   cyclobenzaprine (FLEXERIL) 10 mg tablet   No No   Sig: Take 1 tablet (10 mg total) by mouth 3 (three) times a day as needed for muscle spasms   denosumab (PROLIA) 60 mg/mL  Self Yes No   Sig: Inject 60 mg under the skin every 6 (six) months   diclofenac sodium (VOLTAREN) 1 %  Self Yes No   Sig: Place on the skin   dicyclomine (BENTYL) 10 mg capsule   No No   Sig: Take 1 capsule (10 mg total) by mouth every 6 (six) hours as needed (nausea)   digoxin (Digitek) 0 125 mg tablet   No No   Sig: Take 1 tablet (125 mcg total) by mouth daily   ezetimibe-simvastatin (VYTORIN) 10-20 mg per tablet   No No   Sig: Take 1 tablet by mouth daily at bedtime   levocetirizine (XYZAL) 5 MG tablet   No No   Sig: Take 1 tablet (5 mg total) by mouth every evening   levothyroxine 88 mcg tablet   No No   Sig: Take 1 tablet (88 mcg total) by mouth daily   lisinopril (ZESTRIL) 2 5 mg tablet   No No   Sig: Take 1 tablet (2 5 mg total) by mouth daily   loteprednol etabonate (LOTEMAX) 0 5 % ophthalmic suspension  Self Yes No   Sig: Apply to eye   metFORMIN (GLUCOPHAGE) 500 mg tablet   No No   Sig: Take 2 tablets (1,000 mg total) by mouth 2 (two) times a day   metoprolol tartrate (LOPRESSOR) 50 mg tablet   No No   Sig: TAKE 1 & 1/2 TABLETS (75 MG TOTAL) BY MOUTH 2 (TWO) TIMES A DAY   nitroglycerin (NITROSTAT) 0 4 mg SL tablet  Self Yes No   Sig: Place under the tongue   ondansetron (ZOFRAN) 8 mg tablet   No No   Sig: Take 0 5 tablets (4 mg total) by mouth every 8 (eight) hours as needed for nausea or vomiting   oxybutynin (DITROPAN-XL) 10 MG 24 hr tablet   No No   Sig: TAKE 2 TABLETS (20 MG TOTAL) BY MOUTH DAILY AT BEDTIME   pantoprazole (PROTONIX) 40 mg tablet   No No   Sig: Take 1 tablet (40 mg total) by mouth 2 (two) times a day   senna (SENOKOT) 8 6 MG tablet  Self Yes No   Sig: Take 1 tablet by mouth daily   spironolactone (ALDACTONE) 25 mg tablet   No No   Sig: Take 1 tablet (25 mg total) by mouth daily   terconazole (TERAZOL 7) 0 4 % vaginal cream  Self Yes No   Sig: Insert into the vagina      Facility-Administered Medications: None       Past Medical History:   Diagnosis Date    Anxiety disorder     Atrial fibrillation (HCC)     Cardiac arrhythmia     Cardiomyopathy ([facility] [address] )     Colitis     Diabetes mellitus (HCC)     Dry eyes     Dry mouth     HTN (hypertension)     Hyperlipidemia     IBS (irritable bowel syndrome)     MVP (mitral valve prolapse)     Osteoporosis     SOB (shortness of breath)     Tachycardia        Past Surgical History:   Procedure Laterality Date    DILATION AND CURETTAGE OF UTERUS      x2    WRIST SURGERY         Family History   Problem Relation Age of Onset    Heart attack Mother     Diabetes Mother     Heart attack Father     Stomach cancer Sister      I have reviewed and agree with the history as documented      E-Cigarette/Vaping    E-Cigarette Use Never User      E-Cigarette/Vaping Substances    Nicotine No     THC No     CBD No     Flavoring No     Other No     Unknown No      Social History     Tobacco Use    Smoking status: Never Smoker    Smokeless tobacco: Never Used   Substance Use Topics    Alcohol use: No    Drug use: Never       Review of Systems   Constitutional: Negative for chills and fever  Genitourinary: Positive for flank pain  All other systems reviewed and are negative  Physical Exam  Physical Exam  Vitals signs and nursing note reviewed  Constitutional:       General: She is not in acute distress  Appearance: She is well-developed  She is not diaphoretic  HENT:      Head: Normocephalic and atraumatic  Eyes:      Conjunctiva/sclera: Conjunctivae normal       Pupils: Pupils are equal, round, and reactive to light  Neck:      Musculoskeletal: Normal range of motion and neck supple  Vascular: No JVD  Cardiovascular:      Rate and Rhythm: Normal rate and regular rhythm  Heart sounds: Normal heart sounds  Pulmonary:      Effort: Pulmonary effort is normal       Breath sounds: Normal breath sounds  No stridor  Abdominal:      General: There is no distension  Palpations: Abdomen is soft  Tenderness: There is no abdominal tenderness  There is no guarding or rebound  Musculoskeletal: Normal range of motion  General: No tenderness or deformity  Skin:     General: Skin is warm and dry  Capillary Refill: Capillary refill takes less than 2 seconds  Coloration: Skin is not pale  Findings: No erythema or rash  Neurological:      Mental Status: She is alert and oriented to person, place, and time  Cranial Nerves: No cranial nerve deficit  Sensory: No sensory deficit  Motor: No abnormal muscle tone        Coordination: Coordination normal          Vital Signs  ED Triage Vitals [08/13/20 1244]   Temperature Pulse Respirations Blood Pressure SpO2   98 6 °F (37 °C) (!) 123 18 (!) 193/108 95 %      Temp Source Heart Rate Source Patient Position - Orthostatic VS BP Location FiO2 (%)   Oral Monitor Sitting Right arm --      Pain Score       Worst Possible Pain           Vitals:    08/13/20 1244 08/13/20 1345 08/13/20 1400 08/13/20 1430   BP: (!) 193/108 (!) 192/84 159/73 167/83   Pulse: (!) 123 (!) 130 (!) 130 (!) 136   Patient Position - Orthostatic VS: Sitting Lying Lying Sitting         Visual Acuity      ED Medications  Medications   morphine (PF) 4 mg/mL injection 4 mg (4 mg Intravenous Given 8/13/20 1334)       Diagnostic Studies  Results Reviewed     Procedure Component Value Units Date/Time    Urine Microscopic [[id number]]  (Abnormal) Collected:  08/13/20 1428    Lab Status:  Final result Specimen:  Urine, Clean Catch Updated:  08/13/20 1456     RBC, UA 0-1 /hpf      WBC, UA 4-10 /hpf      Epithelial Cells Occasional /hpf      Bacteria, UA Occasional /hpf      OTHER OBSERVATIONS Yeast Cells Present     MUCUS THREADS Occasional    UA (URINE) with reflex to Scope [[id number]]  (Abnormal) Collected:  08/13/20 1428    Lab Status:  Final result Specimen:  Urine, Clean Catch Updated:  08/13/20 1437     Color, UA Yellow     Clarity, UA Cloudy     Specific Gravity, UA 1 015     pH, UA 7 0     Leukocytes, UA Large     Nitrite, UA Negative     Protein, UA Trace mg/dl      Glucose, UA Negative mg/dl      Ketones, UA Negative mg/dl      Urobilinogen, UA 0 2 E U /dl      Bilirubin, UA Negative     Blood, UA Trace-Intact    Lactic acid [[id number]]  (Abnormal) Collected:  08/13/20 1332    Lab Status:  Final result Specimen:  Blood from Arm, Left Updated:  08/13/20 1436     LACTIC ACID 2 7 mmol/L     Narrative:       Result may be elevated if tourniquet was used during collection      Troponin I [[id number]]  (Normal) Collected:  08/13/20 1347    Lab Status:  Final result Specimen:  Blood from Arm, Left Updated:  08/13/20 1409     Troponin I <0 02 ng/mL     Basic metabolic panel [[id number]]  (Abnormal) Collected:  08/13/20 1332    Lab Status:  Final result Specimen:  Blood from Arm, Left Updated:  08/13/20 1351     Sodium 137 mmol/L      Potassium 4 4 mmol/L      Chloride 99 mmol/L      CO2 28 mmol/L      ANION GAP 10 mmol/L      BUN 26 mg/dL      Creatinine 1 39 mg/dL      Glucose 175 mg/dL      Calcium 9 1 mg/dL      eGFR 37 ml/min/1 73sq m     Narrative:       [place] guidelines for Chronic Kidney Disease (CKD):     Stage 1 with normal or high GFR (GFR > 90 mL/min/1 73 square meters)    Stage 2 Mild CKD (GFR = 60-89 mL/min/1 73 square meters)    Stage 3A Moderate CKD (GFR = 45-59 mL/min/1 73 square meters)    Stage 3B Moderate CKD (GFR = 30-44 mL/min/1 73 square meters)    Stage 4 Severe CKD (GFR = 15-29 mL/min/1 73 square meters)    Stage 5 End Stage CKD (GFR <15 mL/min/1 73 square meters)  Note: GFR calculation is accurate only with a steady state creatinine    CBC and differential [[id number]] Collected:  08/13/20 1332    Lab Status:  Final result Specimen:  Blood from Arm, Left Updated:  08/13/20 1343     WBC 6 17 Thousand/uL      RBC 3 96 Million/uL      Hemoglobin 13 1 g/dL      Hematocrit 38 9 %      MCV 98 fL      MCH 33 1 pg      MCHC 33 7 g/dL      RDW 12 7 %      MPV 10 1 fL      Platelets 458 Thousands/uL      nRBC 0 /100 WBCs      Neutrophils Relative 55 %      Immat GRANS % 0 %      Lymphocytes Relative 33 %      Monocytes Relative 10 %      Eosinophils Relative 1 %      Basophils Relative 1 %      Neutrophils Absolute 3 41 Thousands/µL      Immature Grans Absolute 0 02 Thousand/uL      Lymphocytes Absolute 2 01 Thousands/µL      Monocytes Absolute 0 62 Thousand/µL      Eosinophils Absolute 0 07 Thousand/µL      Basophils Absolute 0 04 Thousands/µL                  CT abdomen pelvis wo contrast   Final Result by Kathrin Driver MD (08/13 1347)      No evidence of nephroureterolithiasis or obstructive uropathy        Moderate amount of stool throughout the colon with mild distention  Correlate for constipation  Workstation performed: [id number]         CT recon only lumbar spine (no charge)   Final Result by Raymund Boxer, MD (08/13 1405)      No fracture or traumatic subluxation  Mild levoscoliotic curvature lumbar spine with advanced lower lumbar L4-L5 and L5-S1 facet arthropathy  Multilevel disc bulges, slightly eccentric to the right with mild right-sided foraminal stenosis  Nonspecific hyperdensity within the rectosigmoid colon likely representing stool ball with inspissated secretions in the rectosigmoid colon  Please ensure that the patient is current with routine screening colonoscopy recommendations  Workstation performed: [id number]                    Procedures  ECG 12 Lead Documentation Only    Date/Time: 8/13/2020 1:40 PM  Performed by: Maribel Han MD  Authorized by: Maribel Han MD     Indications / Diagnosis:  Flank pain  ECG reviewed by me, the ED Provider: yes    Patient location:  ED  Previous ECG:     Previous ECG:  Unavailable  Interpretation:     Interpretation: abnormal    Rate:     ECG rate:  129    ECG rate assessment: tachycardic    Rhythm:     Rhythm: atrial fibrillation    Comments:      Afib, nonspecific ST changes including lateral ST depression  No comparison available  ED Course  ED Course as of Aug 17 2331   Thu Aug 13, 2020   1417 Reevaluated, still in pain  Reports she did not take her AM meds today, still in rapid afib but well appearing and normal WOB                                                   MDM      Disposition  Final diagnoses:   Acute left-sided low back pain with left-sided sciatica     Time reflects when diagnosis was documented in both MDM as applicable and the Disposition within this note     Time User Action Codes Description Comment    8/13/2020  2:32 PM Liam uBsh Add [M54 42] Acute left-sided low back pain with left-sided sciatica       ED Disposition     ED Disposition Condition Date/Time Comment    Discharge Stable u Aug 13, 2020  2:32 PM Cedrick Barron discharge to home/self care              Follow-up Information    None         Discharge Medication List as of 8/13/2020  2:33 PM      START taking these medications    Details   oxyCODONE-acetaminophen (PERCOCET) 5-325 mg per tablet Take 1 tablet by mouth every 4 (four) hours as needed for moderate pain for up to 15 dosesMax Daily Amount: 6 tablets, Starting Thu 8/13/2020, Print         CONTINUE these medications which have NOT CHANGED    Details   apixaban (Eliquis) 5 mg Take 1 tablet (5 mg total) by mouth 2 (two) times a day, Starting Mon 7/13/2020, Normal      Cholecalciferol (VITAMIN D3 PO) Vitamin D3, Historical Med      cyclobenzaprine (FLEXERIL) 10 mg tablet Take 1 tablet (10 mg total) by mouth 3 (three) times a day as needed for muscle spasms, Starting Thu 7/9/2020, Normal      denosumab (PROLIA) 60 mg/mL Inject 60 mg under the skin every 6 (six) months, Historical Med      diclofenac sodium (VOLTAREN) 1 % Place on the skin, Historical Med      dicyclomine (BENTYL) 10 mg capsule Take 1 capsule (10 mg total) by mouth every 6 (six) hours as needed (nausea), Starting Thu 7/9/2020, Normal      digoxin (Digitek) 0 125 mg tablet Take 1 tablet (125 mcg total) by mouth daily, Starting Mon 7/13/2020, Normal      ezetimibe-simvastatin (VYTORIN) 10-20 mg per tablet Take 1 tablet by mouth daily at bedtime, Starting Thu 7/9/2020, Normal      JANUVIA 100 MG tablet Take 1 tablet (100 mg total) by mouth daily, Starting Thu 7/9/2020, Normal      levocetirizine (XYZAL) 5 MG tablet Take 1 tablet (5 mg total) by mouth every evening, Starting Thu 7/9/2020, Normal      levothyroxine 88 mcg tablet Take 1 tablet (88 mcg total) by mouth daily, Starting Mon 7/20/2020, Normal      Linaclotide (LINZESS) 145 MCG CAPS Take by mouth every 8 (eight) hours, Historical Med      lisinopril (ZESTRIL) 2 5 mg tablet Take 1 tablet (2 5 mg total) by mouth daily, Starting Mon 7/13/2020, Normal      loteprednol etabonate (LOTEMAX) 0 5 % ophthalmic suspension Apply to eye, Historical Med      metFORMIN (GLUCOPHAGE) 500 mg tablet Take 2 tablets (1,000 mg total) by mouth 2 (two) times a day, Starting Thu 7/9/2020, Normal      metoprolol tartrate (LOPRESSOR) 50 mg tablet TAKE 1 & 1/2 TABLETS (75 MG TOTAL) BY MOUTH 2 (TWO) TIMES A DAY, Normal      Multiple Vitamins-Minerals (CENTRUM SILVER PO) Take 1 tablet by mouth daily, Starting Tue 1/28/2014, Historical Med      nitroglycerin (NITROSTAT) 0 4 mg SL tablet Place under the tongue, Starting Tue 1/28/2014, Historical Med      ondansetron (ZOFRAN) 8 mg tablet Take 0 5 tablets (4 mg total) by mouth every 8 (eight) hours as needed for nausea or vomiting, Starting u 7/9/2020, Normal      ONDANSETRON PO Take by mouth as needed, Historical Med      oxybutynin (DITROPAN-XL) 10 MG 24 hr tablet TAKE 2 TABLETS (20 MG TOTAL) BY MOUTH DAILY AT BEDTIME, Starting Sat 7/18/2020, Normal      pantoprazole (PROTONIX) 40 mg tablet Take 1 tablet (40 mg total) by mouth 2 (two) times a day, Starting u 7/9/2020, Normal      senna (SENOKOT) 8 6 MG tablet Take 1 tablet by mouth daily, Historical Med      spironolactone (ALDACTONE) 25 mg tablet Take 1 tablet (25 mg total) by mouth daily, Starting Thu 7/9/2020, Normal      terconazole (TERAZOL 7) 0 4 % vaginal cream Insert into the vagina, Historical Med           No discharge procedures on file      PDMP Review     None          ED Provider  Electronically Signed by           Nicolasa Laird MD  08/17/20 1993

## 2020-08-14 ENCOUNTER — TELEPHONE (OUTPATIENT)
Dept: FAMILY MEDICINE CLINIC | Facility: CLINIC | Age: 75
End: 2020-08-14

## 2020-08-14 LAB
ATRIAL RATE: 136 BPM
QRS AXIS: 78 DEGREES
QRSD INTERVAL: 82 MS
QT INTERVAL: 274 MS
QTC INTERVAL: 401 MS
T WAVE AXIS: 235 DEGREES
VENTRICULAR RATE: 129 BPM

## 2020-08-14 PROCEDURE — 93010 ELECTROCARDIOGRAM REPORT: CPT | Performed by: INTERNAL MEDICINE

## 2020-08-14 NOTE — TELEPHONE ENCOUNTER
Pt called and stated that she went to emergency care in [facility] with severe pain and they gave her percocet and she is concerned about taking this with her other medications that she is on  She would like advise from you and let her know if it is ok to take the percocet  She did pick it up but has not taken it yet but is in a lot of pain

## 2020-08-17 NOTE — TELEPHONE ENCOUNTER
Per Dr Lida Otto she is ok to take the Percocet and to make sure she drinks plenty of water  Pt aware

## 2020-09-11 ENCOUNTER — TELEPHONE (OUTPATIENT)
Dept: OBGYN CLINIC | Facility: CLINIC | Age: 75
End: 2020-09-11

## 2020-09-11 DIAGNOSIS — R30.0 DYSURIA: Primary | ICD-10-CM

## 2020-09-11 DIAGNOSIS — R35.0 URINARY FREQUENCY: Primary | ICD-10-CM

## 2020-09-11 RX ORDER — CEFPODOXIME PROXETIL 200 MG/1
200 TABLET, FILM COATED ORAL 2 TIMES DAILY
Qty: 14 TABLET | Refills: 0 | Status: SHIPPED | OUTPATIENT
Start: 2020-09-11 | End: 2020-09-18

## 2020-09-11 NOTE — TELEPHONE ENCOUNTER
Pt  Is having Vaginal itching an yellow discharge and also burning when urinating, she said she never got a change to see a uro dr due to covid, nor did she schedule an us, I advised she get that done but in mean time not sure if you can treat her or make apt?  She only wants to see you for a visit here,

## 2020-09-11 NOTE — TELEPHONE ENCOUNTER
Recommend patient drop urine culture at lab  Can start treatment with same regimen from Jan UTI following culture collection and Rx sent  If no improvement recommend she make appt for evaluation for yeast, etc  Patient should make appt with urology asap given prior concern

## 2020-09-16 ENCOUNTER — TELEPHONE (OUTPATIENT)
Dept: OBGYN CLINIC | Facility: CLINIC | Age: 75
End: 2020-09-16

## 2020-09-16 NOTE — TELEPHONE ENCOUNTER
Patient calling had uti and was given medication and it is helping   Patient went for urinalysis and she couldn't give enough of a sample and didn't know if the lab could process  Patient didn't know if she needed to go for another test or not

## 2020-09-16 NOTE — TELEPHONE ENCOUNTER
Pt contacted and advised as directed  Pt agreed to plan of action and was grateful for the call and information

## 2020-09-16 NOTE — TELEPHONE ENCOUNTER
That's fine if symptoms recur or do not improve should call back  Can continue presumptive treatment

## 2020-10-12 DIAGNOSIS — M62.838 MUSCLE SPASM: ICD-10-CM

## 2020-10-12 DIAGNOSIS — E03.9 HYPOTHYROIDISM, UNSPECIFIED TYPE: ICD-10-CM

## 2020-10-13 RX ORDER — CYCLOBENZAPRINE HCL 10 MG
TABLET ORAL
Qty: 270 TABLET | Refills: 0 | Status: SHIPPED | OUTPATIENT
Start: 2020-10-13 | End: 2021-01-09

## 2020-10-13 RX ORDER — LEVOTHYROXINE SODIUM 88 UG/1
TABLET ORAL
Qty: 90 TABLET | Refills: 0 | Status: SHIPPED | OUTPATIENT
Start: 2020-10-13 | End: 2021-01-07

## 2020-10-21 DIAGNOSIS — N32.81 OAB (OVERACTIVE BLADDER): ICD-10-CM

## 2020-10-22 ENCOUNTER — TELEPHONE (OUTPATIENT)
Dept: OBGYN CLINIC | Facility: CLINIC | Age: 75
End: 2020-10-22

## 2020-10-23 ENCOUNTER — LAB (OUTPATIENT)
Dept: LAB | Facility: CLINIC | Age: 75
End: 2020-10-23
Payer: MEDICARE

## 2020-10-23 DIAGNOSIS — I10 ESSENTIAL HYPERTENSION: ICD-10-CM

## 2020-10-23 DIAGNOSIS — E11.9 TYPE 2 DIABETES MELLITUS WITHOUT COMPLICATION, WITHOUT LONG-TERM CURRENT USE OF INSULIN (HCC): ICD-10-CM

## 2020-10-23 DIAGNOSIS — E78.2 MIXED HYPERLIPIDEMIA: ICD-10-CM

## 2020-10-23 LAB
ALBUMIN SERPL BCP-MCNC: 4.4 G/DL (ref 3.5–5)
ALP SERPL-CCNC: 78 U/L (ref 46–116)
ALT SERPL W P-5'-P-CCNC: 17 U/L (ref 12–78)
ANION GAP SERPL CALCULATED.3IONS-SCNC: 4 MMOL/L (ref 4–13)
AST SERPL W P-5'-P-CCNC: 12 U/L (ref 5–45)
BASOPHILS # BLD AUTO: 0.06 THOUSANDS/ΜL (ref 0–0.1)
BASOPHILS NFR BLD AUTO: 1 % (ref 0–1)
BILIRUB SERPL-MCNC: 0.49 MG/DL (ref 0.2–1)
BUN SERPL-MCNC: 19 MG/DL (ref 5–25)
CALCIUM SERPL-MCNC: 9.5 MG/DL (ref 8.3–10.1)
CHLORIDE SERPL-SCNC: 102 MMOL/L (ref 100–108)
CHOLEST SERPL-MCNC: 136 MG/DL (ref 50–200)
CO2 SERPL-SCNC: 31 MMOL/L (ref 21–32)
CREAT SERPL-MCNC: 1.23 MG/DL (ref 0.6–1.3)
CREAT UR-MCNC: 110 MG/DL
EOSINOPHIL # BLD AUTO: 0.19 THOUSAND/ΜL (ref 0–0.61)
EOSINOPHIL NFR BLD AUTO: 2 % (ref 0–6)
ERYTHROCYTE [DISTWIDTH] IN BLOOD BY AUTOMATED COUNT: 12.4 % (ref 11.6–15.1)
EST. AVERAGE GLUCOSE BLD GHB EST-MCNC: 157 MG/DL
GFR SERPL CREATININE-BSD FRML MDRD: 43 ML/MIN/1.73SQ M
GLUCOSE P FAST SERPL-MCNC: 148 MG/DL (ref 65–99)
HBA1C MFR BLD: 7.1 %
HCT VFR BLD AUTO: 40.1 % (ref 34.8–46.1)
HDLC SERPL-MCNC: 50 MG/DL
HGB BLD-MCNC: 13.2 G/DL (ref 11.5–15.4)
IMM GRANULOCYTES # BLD AUTO: 0.03 THOUSAND/UL (ref 0–0.2)
IMM GRANULOCYTES NFR BLD AUTO: 0 % (ref 0–2)
LDLC SERPL CALC-MCNC: 59 MG/DL (ref 0–100)
LYMPHOCYTES # BLD AUTO: 3.05 THOUSANDS/ΜL (ref 0.6–4.47)
LYMPHOCYTES NFR BLD AUTO: 25 % (ref 14–44)
MAGNESIUM SERPL-MCNC: 1.6 MG/DL (ref 1.6–2.6)
MCH RBC QN AUTO: 32.6 PG (ref 26.8–34.3)
MCHC RBC AUTO-ENTMCNC: 32.9 G/DL (ref 31.4–37.4)
MCV RBC AUTO: 99 FL (ref 82–98)
MICROALBUMIN UR-MCNC: 1160 MG/L (ref 0–20)
MICROALBUMIN/CREAT 24H UR: 1055 MG/G CREATININE (ref 0–30)
MONOCYTES # BLD AUTO: 1.33 THOUSAND/ΜL (ref 0.17–1.22)
MONOCYTES NFR BLD AUTO: 11 % (ref 4–12)
NEUTROPHILS # BLD AUTO: 7.67 THOUSANDS/ΜL (ref 1.85–7.62)
NEUTS SEG NFR BLD AUTO: 61 % (ref 43–75)
NRBC BLD AUTO-RTO: 0 /100 WBCS
PLATELET # BLD AUTO: 236 THOUSANDS/UL (ref 149–390)
PMV BLD AUTO: 11.4 FL (ref 8.9–12.7)
POTASSIUM SERPL-SCNC: 4.4 MMOL/L (ref 3.5–5.3)
PROT SERPL-MCNC: 8.4 G/DL (ref 6.4–8.2)
RBC # BLD AUTO: 4.05 MILLION/UL (ref 3.81–5.12)
SODIUM SERPL-SCNC: 137 MMOL/L (ref 136–145)
TRIGL SERPL-MCNC: 137 MG/DL
URATE SERPL-MCNC: 5 MG/DL (ref 2–6.8)
WBC # BLD AUTO: 12.33 THOUSAND/UL (ref 4.31–10.16)

## 2020-10-23 PROCEDURE — 84550 ASSAY OF BLOOD/URIC ACID: CPT

## 2020-10-23 PROCEDURE — 82570 ASSAY OF URINE CREATININE: CPT

## 2020-10-23 PROCEDURE — 82043 UR ALBUMIN QUANTITATIVE: CPT

## 2020-10-23 PROCEDURE — 36415 COLL VENOUS BLD VENIPUNCTURE: CPT

## 2020-10-23 PROCEDURE — 80061 LIPID PANEL: CPT

## 2020-10-23 PROCEDURE — 85025 COMPLETE CBC W/AUTO DIFF WBC: CPT

## 2020-10-23 PROCEDURE — 83735 ASSAY OF MAGNESIUM: CPT

## 2020-10-23 PROCEDURE — 80053 COMPREHEN METABOLIC PANEL: CPT

## 2020-10-23 PROCEDURE — 83036 HEMOGLOBIN GLYCOSYLATED A1C: CPT

## 2020-10-23 RX ORDER — OXYBUTYNIN CHLORIDE 10 MG/1
20 TABLET, EXTENDED RELEASE ORAL
Qty: 180 TABLET | Refills: 0 | Status: SHIPPED | OUTPATIENT
Start: 2020-10-23 | End: 2021-01-17

## 2020-10-26 ENCOUNTER — TELEPHONE (OUTPATIENT)
Dept: OBGYN CLINIC | Facility: MEDICAL CENTER | Age: 75
End: 2020-10-26

## 2020-10-26 DIAGNOSIS — R30.0 DYSURIA: Primary | ICD-10-CM

## 2020-10-26 RX ORDER — NITROFURANTOIN 25; 75 MG/1; MG/1
100 CAPSULE ORAL 2 TIMES DAILY
Qty: 14 CAPSULE | Refills: 0 | Status: SHIPPED | OUTPATIENT
Start: 2020-10-26 | End: 2021-07-15 | Stop reason: ALTCHOICE

## 2020-10-30 ENCOUNTER — OFFICE VISIT (OUTPATIENT)
Dept: FAMILY MEDICINE CLINIC | Facility: CLINIC | Age: 75
End: 2020-10-30
Payer: MEDICARE

## 2020-10-30 VITALS
BODY MASS INDEX: 22.71 KG/M2 | TEMPERATURE: 98.7 F | HEART RATE: 106 BPM | WEIGHT: 133 LBS | OXYGEN SATURATION: 98 % | SYSTOLIC BLOOD PRESSURE: 154 MMHG | HEIGHT: 64 IN | DIASTOLIC BLOOD PRESSURE: 80 MMHG | RESPIRATION RATE: 18 BRPM

## 2020-10-30 DIAGNOSIS — Z79.4 TYPE 2 DIABETES MELLITUS WITHOUT COMPLICATION, WITH LONG-TERM CURRENT USE OF INSULIN (HCC): ICD-10-CM

## 2020-10-30 DIAGNOSIS — F41.9 ANXIETY: ICD-10-CM

## 2020-10-30 DIAGNOSIS — G62.9 NEUROPATHY: ICD-10-CM

## 2020-10-30 DIAGNOSIS — I42.9 CARDIOMYOPATHY, UNSPECIFIED TYPE (HCC): ICD-10-CM

## 2020-10-30 DIAGNOSIS — E11.9 TYPE 2 DIABETES MELLITUS WITHOUT COMPLICATION, WITH LONG-TERM CURRENT USE OF INSULIN (HCC): ICD-10-CM

## 2020-10-30 DIAGNOSIS — I10 ESSENTIAL HYPERTENSION: Primary | ICD-10-CM

## 2020-10-30 DIAGNOSIS — E78.2 MIXED HYPERLIPIDEMIA: ICD-10-CM

## 2020-10-30 DIAGNOSIS — I48.91 ATRIAL FIBRILLATION, UNSPECIFIED TYPE (HCC): ICD-10-CM

## 2020-10-30 DIAGNOSIS — E03.9 HYPOTHYROIDISM, UNSPECIFIED TYPE: ICD-10-CM

## 2020-10-30 PROCEDURE — 99214 OFFICE O/P EST MOD 30 MIN: CPT | Performed by: FAMILY MEDICINE

## 2020-11-19 ENCOUNTER — TELEPHONE (OUTPATIENT)
Dept: CARDIOLOGY CLINIC | Facility: CLINIC | Age: 75
End: 2020-11-19

## 2020-11-20 ENCOUNTER — HOSPITAL ENCOUNTER (OUTPATIENT)
Dept: NON INVASIVE DIAGNOSTICS | Facility: CLINIC | Age: 75
Discharge: HOME | End: 2020-11-20
Payer: MEDICARE

## 2020-11-20 DIAGNOSIS — I48.20 CHRONIC ATRIAL FIBRILLATION (HCC): ICD-10-CM

## 2020-11-20 PROCEDURE — 93306 TTE W/DOPPLER COMPLETE: CPT

## 2020-11-20 PROCEDURE — 93306 TTE W/DOPPLER COMPLETE: CPT | Performed by: INTERNAL MEDICINE

## 2020-11-23 DIAGNOSIS — I48.20 CHRONIC ATRIAL FIBRILLATION (HCC): Primary | ICD-10-CM

## 2020-11-27 ENCOUNTER — HOSPITAL ENCOUNTER (OUTPATIENT)
Dept: NON INVASIVE DIAGNOSTICS | Facility: CLINIC | Age: 75
Discharge: HOME | End: 2020-11-27
Payer: MEDICARE

## 2020-11-27 DIAGNOSIS — I48.20 CHRONIC ATRIAL FIBRILLATION (HCC): ICD-10-CM

## 2020-11-27 PROCEDURE — 93225 XTRNL ECG REC<48 HRS REC: CPT

## 2020-11-27 PROCEDURE — 93226 XTRNL ECG REC<48 HR SCAN A/R: CPT

## 2020-11-30 DIAGNOSIS — I48.20 CHRONIC ATRIAL FIBRILLATION (HCC): Primary | ICD-10-CM

## 2020-11-30 PROCEDURE — 93227 XTRNL ECG REC<48 HR R&I: CPT | Performed by: INTERNAL MEDICINE

## 2020-12-01 DIAGNOSIS — I48.20 CHRONIC A-FIB (HCC): Primary | ICD-10-CM

## 2020-12-01 RX ORDER — METOPROLOL TARTRATE 100 MG/1
100 TABLET ORAL EVERY 12 HOURS SCHEDULED
Qty: 180 TABLET | Refills: 3 | Status: SHIPPED | OUTPATIENT
Start: 2020-12-01 | End: 2021-12-09

## 2020-12-04 ENCOUNTER — OFFICE VISIT (OUTPATIENT)
Dept: CARDIOLOGY CLINIC | Facility: CLINIC | Age: 75
End: 2020-12-04
Payer: MEDICARE

## 2020-12-04 VITALS
SYSTOLIC BLOOD PRESSURE: 136 MMHG | BODY MASS INDEX: 21.44 KG/M2 | HEART RATE: 70 BPM | WEIGHT: 125.6 LBS | DIASTOLIC BLOOD PRESSURE: 82 MMHG | HEIGHT: 64 IN | OXYGEN SATURATION: 98 %

## 2020-12-04 DIAGNOSIS — I42.9 CARDIOMYOPATHY, UNSPECIFIED TYPE (HCC): ICD-10-CM

## 2020-12-04 DIAGNOSIS — Z79.01 CHRONIC ANTICOAGULATION: ICD-10-CM

## 2020-12-04 DIAGNOSIS — I48.20 CHRONIC ATRIAL FIBRILLATION (HCC): Primary | ICD-10-CM

## 2020-12-04 DIAGNOSIS — I10 ESSENTIAL HYPERTENSION: ICD-10-CM

## 2020-12-04 PROCEDURE — 99214 OFFICE O/P EST MOD 30 MIN: CPT | Performed by: INTERNAL MEDICINE

## 2021-01-06 DIAGNOSIS — E03.9 HYPOTHYROIDISM, UNSPECIFIED TYPE: ICD-10-CM

## 2021-01-07 DIAGNOSIS — L65.9 ALOPECIA: ICD-10-CM

## 2021-01-07 DIAGNOSIS — E11.9 TYPE 2 DIABETES MELLITUS WITHOUT COMPLICATION, WITHOUT LONG-TERM CURRENT USE OF INSULIN (HCC): ICD-10-CM

## 2021-01-07 DIAGNOSIS — E78.2 MIXED HYPERLIPIDEMIA: ICD-10-CM

## 2021-01-07 DIAGNOSIS — J30.1 ALLERGIC RHINITIS DUE TO POLLEN, UNSPECIFIED SEASONALITY: ICD-10-CM

## 2021-01-07 DIAGNOSIS — R11.2 NAUSEA AND VOMITING, INTRACTABILITY OF VOMITING NOT SPECIFIED, UNSPECIFIED VOMITING TYPE: ICD-10-CM

## 2021-01-07 RX ORDER — EZETIMIBE AND SIMVASTATIN 10; 20 MG/1; MG/1
TABLET ORAL
Qty: 90 TABLET | Refills: 1 | Status: SHIPPED | OUTPATIENT
Start: 2021-01-07 | End: 2021-07-07

## 2021-01-07 RX ORDER — LEVOTHYROXINE SODIUM 88 UG/1
TABLET ORAL
Qty: 90 TABLET | Refills: 0 | Status: SHIPPED | OUTPATIENT
Start: 2021-01-07 | End: 2021-04-07

## 2021-01-07 RX ORDER — SPIRONOLACTONE 25 MG/1
TABLET ORAL
Qty: 90 TABLET | Refills: 1 | Status: SHIPPED | OUTPATIENT
Start: 2021-01-07 | End: 2021-07-07

## 2021-01-07 RX ORDER — SITAGLIPTIN 100 MG/1
TABLET, FILM COATED ORAL
Qty: 90 TABLET | Refills: 1 | Status: SHIPPED | OUTPATIENT
Start: 2021-01-07 | End: 2021-07-07

## 2021-01-07 RX ORDER — DICYCLOMINE HYDROCHLORIDE 10 MG/1
10 CAPSULE ORAL EVERY 6 HOURS PRN
Qty: 360 CAPSULE | Refills: 1 | Status: SHIPPED | OUTPATIENT
Start: 2021-01-07 | End: 2021-07-07

## 2021-01-07 RX ORDER — LEVOCETIRIZINE DIHYDROCHLORIDE 5 MG/1
TABLET, FILM COATED ORAL
Qty: 90 TABLET | Refills: 1 | Status: SHIPPED | OUTPATIENT
Start: 2021-01-07 | End: 2021-07-07

## 2021-01-08 LAB
LEFT EYE DIABETIC RETINOPATHY: NORMAL
RIGHT EYE DIABETIC RETINOPATHY: NORMAL

## 2021-01-09 DIAGNOSIS — M62.838 MUSCLE SPASM: ICD-10-CM

## 2021-01-09 RX ORDER — CYCLOBENZAPRINE HCL 10 MG
TABLET ORAL
Qty: 270 TABLET | Refills: 0 | Status: SHIPPED | OUTPATIENT
Start: 2021-01-09 | End: 2021-06-08 | Stop reason: SDUPTHER

## 2021-01-17 DIAGNOSIS — N32.81 OAB (OVERACTIVE BLADDER): ICD-10-CM

## 2021-01-17 RX ORDER — OXYBUTYNIN CHLORIDE 10 MG/1
20 TABLET, EXTENDED RELEASE ORAL
Qty: 180 TABLET | Refills: 0 | Status: SHIPPED | OUTPATIENT
Start: 2021-01-17 | End: 2021-07-15

## 2021-04-07 ENCOUNTER — IMMUNIZATIONS (OUTPATIENT)
Dept: FAMILY MEDICINE CLINIC | Facility: HOSPITAL | Age: 76
End: 2021-04-07

## 2021-04-07 DIAGNOSIS — E03.9 HYPOTHYROIDISM, UNSPECIFIED TYPE: ICD-10-CM

## 2021-04-07 DIAGNOSIS — Z23 ENCOUNTER FOR IMMUNIZATION: Primary | ICD-10-CM

## 2021-04-07 PROCEDURE — 0011A SARS-COV-2 / COVID-19 MRNA VACCINE (MODERNA) 100 MCG: CPT

## 2021-04-07 PROCEDURE — 91301 SARS-COV-2 / COVID-19 MRNA VACCINE (MODERNA) 100 MCG: CPT

## 2021-04-07 RX ORDER — LEVOTHYROXINE SODIUM 88 UG/1
TABLET ORAL
Qty: 90 TABLET | Refills: 0 | Status: SHIPPED | OUTPATIENT
Start: 2021-04-07 | End: 2021-07-03

## 2021-04-10 DIAGNOSIS — I10 HYPERTENSION, ESSENTIAL: ICD-10-CM

## 2021-04-10 RX ORDER — LISINOPRIL 2.5 MG/1
TABLET ORAL
Qty: 90 TABLET | Refills: 2 | Status: SHIPPED | OUTPATIENT
Start: 2021-04-10 | End: 2022-01-17

## 2021-04-13 PROBLEM — E11.51 TYPE 2 DIABETES MELLITUS WITH DIABETIC PERIPHERAL ANGIOPATHY WITHOUT GANGRENE (HCC): Status: ACTIVE | Noted: 2020-01-28

## 2021-04-21 DIAGNOSIS — R11.2 NAUSEA AND VOMITING, INTRACTABILITY OF VOMITING NOT SPECIFIED, UNSPECIFIED VOMITING TYPE: ICD-10-CM

## 2021-04-21 RX ORDER — ONDANSETRON HYDROCHLORIDE 8 MG/1
4 TABLET, FILM COATED ORAL EVERY 8 HOURS PRN
Qty: 270 TABLET | Refills: 1 | Status: SHIPPED | OUTPATIENT
Start: 2021-04-21 | End: 2021-11-17 | Stop reason: SDUPTHER

## 2021-05-05 ENCOUNTER — IMMUNIZATIONS (OUTPATIENT)
Dept: FAMILY MEDICINE CLINIC | Facility: HOSPITAL | Age: 76
End: 2021-05-05

## 2021-05-05 DIAGNOSIS — Z23 ENCOUNTER FOR IMMUNIZATION: Primary | ICD-10-CM

## 2021-05-05 PROCEDURE — 0012A SARS-COV-2 / COVID-19 MRNA VACCINE (MODERNA) 100 MCG: CPT

## 2021-05-05 PROCEDURE — 91301 SARS-COV-2 / COVID-19 MRNA VACCINE (MODERNA) 100 MCG: CPT

## 2021-05-14 ENCOUNTER — APPOINTMENT (OUTPATIENT)
Dept: LAB | Facility: CLINIC | Age: 76
End: 2021-05-14
Payer: MEDICARE

## 2021-05-14 DIAGNOSIS — E11.9 TYPE 2 DIABETES MELLITUS WITHOUT COMPLICATION, WITH LONG-TERM CURRENT USE OF INSULIN (HCC): ICD-10-CM

## 2021-05-14 DIAGNOSIS — Z79.4 TYPE 2 DIABETES MELLITUS WITHOUT COMPLICATION, WITH LONG-TERM CURRENT USE OF INSULIN (HCC): ICD-10-CM

## 2021-05-14 DIAGNOSIS — E03.9 HYPOTHYROIDISM, UNSPECIFIED TYPE: ICD-10-CM

## 2021-05-14 LAB
ALBUMIN SERPL BCP-MCNC: 4 G/DL (ref 3.5–5)
ALP SERPL-CCNC: 73 U/L (ref 46–116)
ALT SERPL W P-5'-P-CCNC: 17 U/L (ref 12–78)
ANION GAP SERPL CALCULATED.3IONS-SCNC: 5 MMOL/L (ref 4–13)
AST SERPL W P-5'-P-CCNC: 13 U/L (ref 5–45)
BILIRUB SERPL-MCNC: 0.53 MG/DL (ref 0.2–1)
BUN SERPL-MCNC: 19 MG/DL (ref 5–25)
CALCIUM SERPL-MCNC: 9.6 MG/DL (ref 8.3–10.1)
CHLORIDE SERPL-SCNC: 107 MMOL/L (ref 100–108)
CO2 SERPL-SCNC: 29 MMOL/L (ref 21–32)
CREAT SERPL-MCNC: 1.14 MG/DL (ref 0.6–1.3)
EST. AVERAGE GLUCOSE BLD GHB EST-MCNC: 171 MG/DL
GFR SERPL CREATININE-BSD FRML MDRD: 47 ML/MIN/1.73SQ M
GLUCOSE SERPL-MCNC: 120 MG/DL (ref 65–140)
HBA1C MFR BLD: 7.6 %
POTASSIUM SERPL-SCNC: 4.5 MMOL/L (ref 3.5–5.3)
PROT SERPL-MCNC: 7.7 G/DL (ref 6.4–8.2)
SODIUM SERPL-SCNC: 141 MMOL/L (ref 136–145)
TSH SERPL DL<=0.05 MIU/L-ACNC: 0.68 UIU/ML (ref 0.36–3.74)

## 2021-05-14 PROCEDURE — 80053 COMPREHEN METABOLIC PANEL: CPT

## 2021-05-14 PROCEDURE — 84443 ASSAY THYROID STIM HORMONE: CPT

## 2021-05-14 PROCEDURE — 83036 HEMOGLOBIN GLYCOSYLATED A1C: CPT

## 2021-05-14 PROCEDURE — 36415 COLL VENOUS BLD VENIPUNCTURE: CPT

## 2021-05-27 ENCOUNTER — OFFICE VISIT (OUTPATIENT)
Dept: FAMILY MEDICINE CLINIC | Facility: CLINIC | Age: 76
End: 2021-05-27
Payer: MEDICARE

## 2021-05-27 VITALS
HEART RATE: 104 BPM | DIASTOLIC BLOOD PRESSURE: 64 MMHG | SYSTOLIC BLOOD PRESSURE: 126 MMHG | BODY MASS INDEX: 21.51 KG/M2 | HEIGHT: 64 IN | OXYGEN SATURATION: 98 % | TEMPERATURE: 97.2 F | WEIGHT: 126 LBS

## 2021-05-27 DIAGNOSIS — Z11.59 NEED FOR HEPATITIS C SCREENING TEST: ICD-10-CM

## 2021-05-27 DIAGNOSIS — E78.2 MIXED HYPERLIPIDEMIA: ICD-10-CM

## 2021-05-27 DIAGNOSIS — Z23 ENCOUNTER FOR IMMUNIZATION: ICD-10-CM

## 2021-05-27 DIAGNOSIS — E11.51 TYPE 2 DIABETES MELLITUS WITH DIABETIC PERIPHERAL ANGIOPATHY WITHOUT GANGRENE, WITHOUT LONG-TERM CURRENT USE OF INSULIN (HCC): Primary | ICD-10-CM

## 2021-05-27 DIAGNOSIS — E03.4 HYPOTHYROIDISM DUE TO ACQUIRED ATROPHY OF THYROID: ICD-10-CM

## 2021-05-27 DIAGNOSIS — Z12.12 SCREENING FOR COLORECTAL CANCER: ICD-10-CM

## 2021-05-27 DIAGNOSIS — Z12.11 SCREENING FOR COLORECTAL CANCER: ICD-10-CM

## 2021-05-27 DIAGNOSIS — Z00.00 WELL ADULT EXAM: ICD-10-CM

## 2021-05-27 DIAGNOSIS — I42.9 CARDIOMYOPATHY, UNSPECIFIED TYPE (HCC): ICD-10-CM

## 2021-05-27 DIAGNOSIS — I48.91 ATRIAL FIBRILLATION, UNSPECIFIED TYPE (HCC): ICD-10-CM

## 2021-05-27 DIAGNOSIS — M81.0 AGE RELATED OSTEOPOROSIS, UNSPECIFIED PATHOLOGICAL FRACTURE PRESENCE: ICD-10-CM

## 2021-05-27 DIAGNOSIS — I10 ESSENTIAL HYPERTENSION: ICD-10-CM

## 2021-05-27 PROCEDURE — 1123F ACP DISCUSS/DSCN MKR DOCD: CPT | Performed by: FAMILY MEDICINE

## 2021-05-27 PROCEDURE — 96372 THER/PROPH/DIAG INJ SC/IM: CPT

## 2021-05-27 PROCEDURE — G0439 PPPS, SUBSEQ VISIT: HCPCS | Performed by: FAMILY MEDICINE

## 2021-05-27 PROCEDURE — 99214 OFFICE O/P EST MOD 30 MIN: CPT | Performed by: FAMILY MEDICINE

## 2021-05-27 NOTE — PATIENT INSTRUCTIONS
Medicare Preventive Visit Patient Instructions  Thank you for completing your Welcome to Medicare Visit or Medicare Annual Wellness Visit today  Your next wellness visit will be due in one year (5/28/2022)  The screening/preventive services that you may require over the next 5-10 years are detailed below  Some tests may not apply to you based off risk factors and/or age  Screening tests ordered at today's visit but not completed yet may show as past due  Also, please note that scanned in results may not display below  Preventive Screenings:  Service Recommendations Previous Testing/Comments   Colorectal Cancer Screening  * Colonoscopy    * Fecal Occult Blood Test (FOBT)/Fecal Immunochemical Test (FIT)  * Fecal DNA/Cologuard Test  * Flexible Sigmoidoscopy Age: 54-65 years old   Colonoscopy: every 10 years (may be performed more frequently if at higher risk)  OR  FOBT/FIT: every 1 year  OR  Cologuard: every 3 years  OR  Sigmoidoscopy: every 5 years  Screening may be recommended earlier than age 48 if at higher risk for colorectal cancer  Also, an individualized decision between you and your healthcare provider will decide whether screening between the ages of 74-80 would be appropriate  Colonoscopy: Not on file  FOBT/FIT: Not on file  Cologuard: Not on file  Sigmoidoscopy: Not on file          Breast Cancer Screening Age: 36 years old  Frequency: every 1-2 years  Not required if history of left and right mastectomy Mammogram: Not on file        Cervical Cancer Screening Between the ages of 21-29, pap smear recommended once every 3 years  Between the ages of 33-67, can perform pap smear with HPV co-testing every 5 years     Recommendations may differ for women with a history of total hysterectomy, cervical cancer, or abnormal pap smears in past  Pap Smear: Not on file    Screening Not Indicated   Hepatitis C Screening Once for adults born between [facility]  More frequently in patients at high risk for Hepatitis C Hep C Antibody: Not on file        Diabetes Screening 1-2 times per year if you're at risk for diabetes or have pre-diabetes Fasting glucose: 148 mg/dL   A1C: 7 6 %    Screening Not Indicated  History Diabetes   Cholesterol Screening Once every 5 years if you don't have a lipid disorder  May order more often based on risk factors  Lipid panel: 10/23/2020    Screening Not Indicated  History Lipid Disorder     Other Preventive Screenings Covered by Medicare:  1  Abdominal Aortic Aneurysm (AAA) Screening: covered once if your at risk  You're considered to be at risk if you have a family history of AAA  2  Lung Cancer Screening: covers low dose CT scan once per year if you meet all of the following conditions: (1) Age 50-69; (2) No signs or symptoms of lung cancer; (3) Current smoker or have quit smoking within the last 15 years; (4) You have a tobacco smoking history of at least 30 pack years (packs per day multiplied by number of years you smoked); (5) You get a written order from a healthcare provider  3  Glaucoma Screening: covered annually if you're considered high risk: (1) You have diabetes OR (2) Family history of glaucoma OR (3)  aged 48 and older OR (3)  American aged 72 and older  3  Osteoporosis Screening: covered every 2 years if you meet one of the following conditions: (1) You're estrogen deficient and at risk for osteoporosis based off medical history and other findings; (2) Have a vertebral abnormality; (3) On glucocorticoid therapy for more than 3 months; (4) Have primary hyperparathyroidism; (5) On osteoporosis medications and need to assess response to drug therapy  · Last bone density test (DXA Scan): Not on file  5  HIV Screening: covered annually if you're between the age of 12-76  Also covered annually if you are younger than 13 and older than 72 with risk factors for HIV infection   For pregnant patients, it is covered up to 3 times per pregnancy  Immunizations:  Immunization Recommendations   Influenza Vaccine Annual influenza vaccination during flu season is recommended for all persons aged >= 6 months who do not have contraindications   Pneumococcal Vaccine (Prevnar and Pneumovax)  * Prevnar = PCV13  * Pneumovax = PPSV23   Adults 25-60 years old: 1-3 doses may be recommended based on certain risk factors  Adults 72 years old: Prevnar (PCV13) vaccine recommended followed by Pneumovax (PPSV23) vaccine  If already received PPSV23 since turning 65, then PCV13 recommended at least one year after PPSV23 dose  Hepatitis B Vaccine 3 dose series if at intermediate or high risk (ex: diabetes, end stage renal disease, liver disease)   Tetanus (Td) Vaccine - COST NOT COVERED BY MEDICARE PART B Following completion of primary series, a booster dose should be given every 10 years to maintain immunity against tetanus  Td may also be given as tetanus wound prophylaxis  Tdap Vaccine - COST NOT COVERED BY MEDICARE PART B Recommended at least once for all adults  For pregnant patients, recommended with each pregnancy  Shingles Vaccine (Shingrix) - COST NOT COVERED BY MEDICARE PART B  2 shot series recommended in those aged 48 and above     Health Maintenance Due:      Topic Date Due    Hepatitis C Screening  Never done    Colorectal Cancer Screening  Never done     Immunizations Due:      Topic Date Due    DTaP,Tdap,and Td Vaccines (1 - Tdap) Never done    Pneumococcal Vaccine: 65+ Years (1 of 1 - PPSV23) Never done     Advance Directives   What are advance directives? Advance directives are legal documents that state your wishes and plans for medical care  These plans are made ahead of time in case you lose your ability to make decisions for yourself  Advance directives can apply to any medical decision, such as the treatments you want, and if you want to donate organs  What are the types of advance directives?   There are many types of advance directives, and each state has rules about how to use them  You may choose a combination of any of the following:  · Living will: This is a written record of the treatment you want  You can also choose which treatments you do not want, which to limit, and which to stop at a certain time  This includes surgery, medicine, IV fluid, and tube feedings  · Durable power of  for healthcare [facility]): This is a written record that states who you want to make healthcare choices for you when you are unable to make them for yourself  This person, called a proxy, is usually a family member or a friend  You may choose more than 1 proxy  · Do not resuscitate (DNR) order:  A DNR order is used in case your heart stops beating or you stop breathing  It is a request not to have certain forms of treatment, such as CPR  A DNR order may be included in other types of advance directives  · Medical directive: This covers the care that you want if you are in a coma, near death, or unable to make decisions for yourself  You can list the treatments you want for each condition  Treatment may include pain medicine, surgery, blood transfusions, dialysis, IV or tube feedings, and a ventilator (breathing machine)  · Values history: This document has questions about your views, beliefs, and how you feel and think about life  This information can help others choose the care that you would choose  Why are advance directives important? An advance directive helps you control your care  Although spoken wishes may be used, it is better to have your wishes written down  Spoken wishes can be misunderstood, or not followed  Treatments may be given even if you do not want them  An advance directive may make it easier for your family to make difficult choices about your care  Urinary Incontinence   Urinary incontinence (UI)  is when you lose control of your bladder  UI develops because your bladder cannot store or empty urine properly   The 3 most common types of UI are stress incontinence, urge incontinence, or both  Medicines:   · May be given to help strengthen your bladder control  Report any side effects of medication to your healthcare provider  Do pelvic muscle exercises often:  Your pelvic muscles help you stop urinating  Squeeze these muscles tight for 5 seconds, then relax for 5 seconds  Gradually work up to squeezing for 10 seconds  Do 3 sets of 15 repetitions a day, or as directed  This will help strengthen your pelvic muscles and improve bladder control  Train your bladder:  Go to the bathroom at set times, such as every 2 hours, even if you do not feel the urge to go  You can also try to hold your urine when you feel the urge to go  For example, hold your urine for 5 minutes when you feel the urge to go  As that becomes easier, hold your urine for 10 minutes  Self-care:   · Keep a UI record  Write down how often you leak urine and how much you leak  Make a note of what you were doing when you leaked urine  · Drink liquids as directed  You may need to limit the amount of liquid you drink to help control your urine leakage  Do not drink any liquid right before you go to bed  Limit or do not have drinks that contain caffeine or alcohol  · Prevent constipation  Eat a variety of high-fiber foods  Good examples are high-fiber cereals, beans, vegetables, and whole-grain breads  Walking is the best way to trigger your intestines to have a bowel movement  · Exercise regularly and maintain a healthy weight  Weight loss and exercise will decrease pressure on your bladder and help you control your leakage  · Use a catheter as directed  to help empty your bladder  A catheter is a tiny, plastic tube that is put into your bladder to drain your urine  · Go to behavior therapy as directed  Behavior therapy may be used to help you learn to control your urge to urinate         © Copyright [employer] 2018 Information is for End User's use only and may not be sold, redistributed or otherwise used for commercial purposes   All illustrations and images included in CareNotes® are the copyrighted property of A D A M , Inc  or [facility] Toya Sierra

## 2021-05-27 NOTE — PROGRESS NOTES
Falls Plan of Care: balance, strength, and gait training instructions were provided  Recommended assistive device to help with gait and balance  Assessment/Plan:         Problem List Items Addressed This Visit        Endocrine    Type 2 diabetes mellitus with diabetic peripheral angiopathy without gangrene ([facility] ) - Primary       Lab Results   Component Value Date    HGBA1C 7 6 (H) 05/14/2021   Needs better sugar control  Hypothyroidism     Patient to continue current dose of thyroid medicine and recheck TSH in 6 months            Cardiovascular and Mediastinum    Atrial fibrillation [facility])     Patient is stable  and will continue present plan of care and reassess at next routine visit  All questions about this problem from patient were answered today  Hypertension     Patient is stable with current anti-hypertensive medicine and continue to follow a low sodium diet and take current medication  All questions about this condition were answered today  Other    Hyperlipidemia     Patient  is stable with current medication and we discussed a low fat low cholesterol diet  Weight loss also discussed for this will help lower cholesterol also  Recheck lipids in 6 months  Other Visit Diagnoses     Need for hepatitis C screening test        Screening for colorectal cancer        Encounter for immunization                Subjective:      Patient ID: Arcelia Washington is a 77 y o  female  This is a 29-year-old white female for checkup on diabetes history of hypertension atrial fibrillation some anxiety hyperlipidemia as well as osteoporosis  Patient is doing well with her medicines we did look to her lab results her A1c was little high at 7 6 she will watch her diet till next time she is currently on Januvia and on Actos  Also patient was interested Wilhelm shot today and is doing well with her medications    Patient will get refills when her pharmacy says she needs them they will call us for that  The following portions of the patient's history were reviewed and updated as appropriate:   Past Medical History:  She has a past medical history of Anxiety disorder, Atrial fibrillation ([facility] [address] ), Cardiac arrhythmia, Cardiomyopathy ([facility] ), Colitis, Diabetes mellitus [facility] ), Dry eyes, Dry mouth, HTN (hypertension), Hyperlipidemia, IBS (irritable bowel syndrome), MVP (mitral valve prolapse), Osteoporosis, SOB (shortness of breath), and Tachycardia ,  _______________________________________________________________________  Medical Problems:  does not have any pertinent problems on file ,  _______________________________________________________________________  Past Surgical History:   has a past surgical history that includes Wrist surgery and Dilation and curettage of uterus  ,  _______________________________________________________________________  Family History:  family history includes Diabetes in her mother; Heart attack in her father and mother; Stomach cancer in her sister  ,  _______________________________________________________________________  Social History:   reports that she has never smoked  She has never used smokeless tobacco  She reports that she does not drink alcohol or use drugs  ,  _______________________________________________________________________  Allergies:  is allergic to butoconazole; moxifloxacin; neomycin-bacitracin zn-polymyx; smz-tmp ds [sulfamethoxazole-trimethoprim]; sulfa antibiotics; and telithromycin     _______________________________________________________________________  Current Outpatient Medications   Medication Sig Dispense Refill    apixaban (Eliquis) 5 mg Take 1 tablet (5 mg total) by mouth 2 (two) times a day 180 tablet 3    Cholecalciferol (VITAMIN D3 PO) Vitamin D3      cyclobenzaprine (FLEXERIL) 10 mg tablet TAKE 1 TABLET BY MOUTH THREE TIMES A DAY AS NEEDED FOR MUSCLE SPASMS 270 tablet 0    denosumab (PROLIA) 60 mg/mL Inject 60 mg under the skin every 6 (six) months      diclofenac sodium (VOLTAREN) 1 % Place on the skin      dicyclomine (BENTYL) 10 mg capsule TAKE 1 CAPSULE (10 MG TOTAL) BY MOUTH EVERY 6 (SIX) HOURS AS NEEDED (NAUSEA) 360 capsule 1    digoxin (Digitek) 0 125 mg tablet Take 1 tablet (125 mcg total) by mouth daily 90 tablet 3    ezetimibe-simvastatin (VYTORIN) 10-20 mg per tablet TAKE 1 TABLET BY MOUTH EVERYDAY AT BEDTIME 90 tablet 1    Januvia 100 MG tablet TAKE 1 TABLET BY MOUTH EVERY DAY 90 tablet 1    levocetirizine (XYZAL) 5 MG tablet TAKE 1 TABLET BY MOUTH EVERY DAY IN THE EVENING 90 tablet 1    levothyroxine 88 mcg tablet TAKE 1 TABLET BY MOUTH EVERY DAY 90 tablet 0    Linaclotide (LINZESS) 145 MCG CAPS Take by mouth every 8 (eight) hours      lisinopril (ZESTRIL) 2 5 mg tablet TAKE 1 TABLET BY MOUTH EVERY DAY 90 tablet 2    loteprednol etabonate (LOTEMAX) 0 5 % ophthalmic suspension Apply to eye      metFORMIN (GLUCOPHAGE) 500 mg tablet TAKE 2 TABLETS BY MOUTH TWICE A  tablet 1    metoprolol tartrate (LOPRESSOR) 100 mg tablet Take 1 tablet (100 mg total) by mouth every 12 (twelve) hours 180 tablet 3    Multiple Vitamins-Minerals (CENTRUM SILVER PO) Take 1 tablet by mouth daily      nitrofurantoin (MACROBID) 100 mg capsule Take 1 capsule (100 mg total) by mouth 2 (two) times a day (Patient not taking: Reported on 12/4/2020) 14 capsule 0    nitroglycerin (NITROSTAT) 0 4 mg SL tablet Place under the tongue      ondansetron (ZOFRAN) 8 mg tablet Take 0 5 tablets (4 mg total) by mouth every 8 (eight) hours as needed for nausea or vomiting 270 tablet 1    ONDANSETRON PO Take by mouth as needed      oxybutynin (DITROPAN-XL) 10 MG 24 hr tablet TAKE 2 TABLETS (20 MG TOTAL) BY MOUTH DAILY AT BEDTIME 180 tablet 0    oxyCODONE-acetaminophen (PERCOCET) 5-325 mg per tablet Take 1 tablet by mouth every 4 (four) hours as needed for moderate pain for up to 15 dosesMax Daily Amount: 6 tablets 15 tablet 0    pantoprazole (PROTONIX) 40 mg tablet Take 1 tablet (40 mg total) by mouth 2 (two) times a day 180 tablet 3    senna (SENOKOT) 8 6 MG tablet Take 1 tablet by mouth daily      spironolactone (ALDACTONE) 25 mg tablet TAKE 1 TABLET BY MOUTH EVERY DAY 90 tablet 1    terconazole (TERAZOL 7) 0 4 % vaginal cream Insert into the vagina       No current facility-administered medications for this visit       _______________________________________________________________________  Review of Systems   Constitutional: Negative for activity change, appetite change, fatigue and fever  HENT: Negative for congestion, ear pain, postnasal drip, rhinorrhea, sinus pressure, sinus pain, sneezing and sore throat  Eyes: Negative for pain and redness  Respiratory: Negative for apnea, cough, chest tightness, shortness of breath and wheezing  Cardiovascular: Negative for chest pain, palpitations and leg swelling  Gastrointestinal: Negative for abdominal pain, constipation, diarrhea, nausea and vomiting  Endocrine: Negative for cold intolerance and heat intolerance  Genitourinary: Negative for difficulty urinating, dysuria, frequency, hematuria and urgency  Musculoskeletal: Negative for arthralgias, back pain, gait problem and myalgias  Skin: Negative for rash  Neurological: Negative for dizziness, speech difficulty, weakness, numbness and headaches  Hematological: Does not bruise/bleed easily  Psychiatric/Behavioral: Negative for agitation, confusion and hallucinations  Objective: There were no vitals filed for this visit  There is no height or weight on file to calculate BMI  Physical Exam  Vitals signs and nursing note reviewed  Constitutional:       Appearance: She is well-developed  HENT:      Head: Normocephalic and atraumatic  Nose: Nose normal       Mouth/Throat:      Mouth: Mucous membranes are moist    Eyes:      General: No scleral icterus       Conjunctiva/sclera: Conjunctivae normal  Pupils: Pupils are equal, round, and reactive to light  Neck:      Musculoskeletal: Normal range of motion and neck supple  Thyroid: No thyromegaly  Cardiovascular:      Rate and Rhythm: Normal rate and regular rhythm  Pulmonary:      Effort: Pulmonary effort is normal       Breath sounds: Normal breath sounds  No wheezing  Abdominal:      General: Bowel sounds are normal  There is no distension  Palpations: Abdomen is soft  Tenderness: There is no abdominal tenderness  There is no guarding or rebound  Musculoskeletal: Normal range of motion  General: No tenderness or deformity  Skin:     General: Skin is warm and dry  Findings: No erythema or rash  Neurological:      Mental Status: She is alert and oriented to person, place, and time  Sensory: No sensory deficit  Psychiatric:         Mood and Affect: Mood normal          Behavior: Behavior normal          Thought Content:  Thought content normal          Judgment: Judgment normal

## 2021-05-27 NOTE — PROGRESS NOTES
Assessment and Plan:     Problem List Items Addressed This Visit        Endocrine    Type 2 diabetes mellitus with diabetic peripheral angiopathy without gangrene ([facility] [address] ) - Primary       Lab Results   Component Value Date    HGBA1C 7 6 (H) 05/14/2021   Needs better sugar control  Hypothyroidism     Patient to continue current dose of thyroid medicine and recheck TSH in 6 months            Cardiovascular and Mediastinum    Atrial fibrillation [facility])     Patient is stable  and will continue present plan of care and reassess at next routine visit  All questions about this problem from patient were answered today  Hypertension     Patient is stable with current anti-hypertensive medicine and continue to follow a low sodium diet and take current medication  All questions about this condition were answered today  Other    Hyperlipidemia     Patient  is stable with current medication and we discussed a low fat low cholesterol diet  Weight loss also discussed for this will help lower cholesterol also  Recheck lipids in 6 months  Other Visit Diagnoses     Need for hepatitis C screening test        Screening for colorectal cancer        Encounter for immunization               Preventive health issues were discussed with patient, and age appropriate screening tests were ordered as noted in patient's After Visit Summary  Personalized health advice and appropriate referrals for health education or preventive services given if needed, as noted in patient's After Visit Summary       History of Present Illness:     Patient presents for Medicare Annual Wellness visit    Patient Care Team:  Jolie Perry MD as PCP - General (Family Medicine)  Patty Cruz MD     Problem List:     Patient Active Problem List   Diagnosis    Atrial fibrillation ([facility] [place] [address] )    [facility]    Hypertension    Hyperlipidemia    Chest pain    Pelvic pain    Ankle joint effusion    Anxiety    Arthritis  Type 2 diabetes mellitus with diabetic peripheral angiopathy without gangrene ([place] [address] )    Irritable bowel syndrome    Mitral valvular disorder    Neuropathy    Peripheral vascular disease (HCC)    Reflex sympathetic dystrophy of other specified site    Hypothyroidism      Past Medical and Surgical History:     Past Medical History:   Diagnosis Date    Anxiety disorder     Atrial fibrillation ([place] [address] )     Cardiac arrhythmia     Cardiomyopathy ([place] [address] )     Colitis     Diabetes mellitus ([place] [address] )     Dry eyes     Dry mouth     HTN (hypertension)     Hyperlipidemia     IBS (irritable bowel syndrome)     MVP (mitral valve prolapse)     Osteoporosis     SOB (shortness of breath)     Tachycardia      Past Surgical History:   Procedure Laterality Date    DILATION AND CURETTAGE OF UTERUS      x2    WRIST SURGERY        Family History:     Family History   Problem Relation Age of Onset    Heart attack Mother     Diabetes Mother     Heart attack Father     Stomach cancer Sister       Social History:     E-Cigarette/Vaping    E-Cigarette Use Never User      E-Cigarette/Vaping Substances    Nicotine No     THC No     CBD No     Flavoring No     Other No     Unknown No      Social History     Socioeconomic History    Marital status: /Civil Union     Spouse name: None    Number of children: None    Years of education: None    Highest education level: None   Occupational History    Occupation: Retired   Social Needs    Financial resource strain: None    Food insecurity     Worry: None     Inability: None    Transportation needs     Medical: None     Non-medical: None   Tobacco Use    Smoking status: Never Smoker    Smokeless tobacco: Never Used   Substance and Sexual Activity    Alcohol use: No    Drug use: Never    Sexual activity: Not Currently   Lifestyle    Physical activity     Days per week: None     Minutes per session: None    Stress: None   Relationships    Social connections Talks on phone: None     Gets together: None     Attends Yarsani service: None     Active member of club or organization: None     Attends meetings of clubs or organizations: None     Relationship status: None    Intimate partner violence     Fear of current or ex partner: None     Emotionally abused: None     Physically abused: None     Forced sexual activity: None   Other Topics Concern    None   Social History Narrative    · Most recent tobacco use screenin2019      · Do you currently or have you served in the [employer]:   No      · Were you activated, into active duty, as a member of the [employer] or as a Reservist:   No      · Sexual orientation:   Heterosexual      · Diet:   Diabetic       · Caffeine intake:   Occasional      · Seat belts used routinely:   Yes      · Sunscreen used routinely:   No      · Smoke alarm in home: Yes      · Advance directive:    Yes      · Salt Intake:   not much       Medications and Allergies:     Current Outpatient Medications   Medication Sig Dispense Refill    apixaban (Eliquis) 5 mg Take 1 tablet (5 mg total) by mouth 2 (two) times a day 180 tablet 3    Cholecalciferol (VITAMIN D3 PO) Vitamin D3      cyclobenzaprine (FLEXERIL) 10 mg tablet TAKE 1 TABLET BY MOUTH THREE TIMES A DAY AS NEEDED FOR MUSCLE SPASMS 270 tablet 0    denosumab (PROLIA) 60 mg/mL Inject 60 mg under the skin every 6 (six) months      dicyclomine (BENTYL) 10 mg capsule TAKE 1 CAPSULE (10 MG TOTAL) BY MOUTH EVERY 6 (SIX) HOURS AS NEEDED (NAUSEA) 360 capsule 1    digoxin (Digitek) 0 125 mg tablet Take 1 tablet (125 mcg total) by mouth daily 90 tablet 3    ezetimibe-simvastatin (VYTORIN) 10-20 mg per tablet TAKE 1 TABLET BY MOUTH EVERYDAY AT BEDTIME 90 tablet 1    Januvia 100 MG tablet TAKE 1 TABLET BY MOUTH EVERY DAY 90 tablet 1    levocetirizine (XYZAL) 5 MG tablet TAKE 1 TABLET BY MOUTH EVERY DAY IN THE EVENING 90 tablet 1    levothyroxine 88 mcg tablet TAKE 1 TABLET BY MOUTH EVERY DAY 90 tablet 0    lisinopril (ZESTRIL) 2 5 mg tablet TAKE 1 TABLET BY MOUTH EVERY DAY 90 tablet 2    metFORMIN (GLUCOPHAGE) 500 mg tablet TAKE 2 TABLETS BY MOUTH TWICE A  tablet 1    metoprolol tartrate (LOPRESSOR) 100 mg tablet Take 1 tablet (100 mg total) by mouth every 12 (twelve) hours 180 tablet 3    Multiple Vitamins-Minerals (CENTRUM SILVER PO) Take 1 tablet by mouth daily      ondansetron (ZOFRAN) 8 mg tablet Take 0 5 tablets (4 mg total) by mouth every 8 (eight) hours as needed for nausea or vomiting 270 tablet 1    oxybutynin (DITROPAN-XL) 10 MG 24 hr tablet TAKE 2 TABLETS (20 MG TOTAL) BY MOUTH DAILY AT BEDTIME 180 tablet 0    pantoprazole (PROTONIX) 40 mg tablet Take 1 tablet (40 mg total) by mouth 2 (two) times a day 180 tablet 3    senna (SENOKOT) 8 6 MG tablet Take 1 tablet by mouth daily      spironolactone (ALDACTONE) 25 mg tablet TAKE 1 TABLET BY MOUTH EVERY DAY 90 tablet 1    diclofenac sodium (VOLTAREN) 1 % Place on the skin      Linaclotide (LINZESS) 145 MCG CAPS Take by mouth every 8 (eight) hours      loteprednol etabonate (LOTEMAX) 0 5 % ophthalmic suspension Apply to eye      nitrofurantoin (MACROBID) 100 mg capsule Take 1 capsule (100 mg total) by mouth 2 (two) times a day (Patient not taking: Reported on 12/4/2020) 14 capsule 0    nitroglycerin (NITROSTAT) 0 4 mg SL tablet Place under the tongue      ONDANSETRON PO Take by mouth as needed      oxyCODONE-acetaminophen (PERCOCET) 5-325 mg per tablet Take 1 tablet by mouth every 4 (four) hours as needed for moderate pain for up to 15 dosesMax Daily Amount: 6 tablets (Patient not taking: Reported on 5/27/2021) 15 tablet 0    terconazole (TERAZOL 7) 0 4 % vaginal cream Insert into the vagina       No current facility-administered medications for this visit        Allergies   Allergen Reactions    Butoconazole     Moxifloxacin     Neomycin-Bacitracin Zn-Polymyx     Smz-Tmp Ds [Sulfamethoxazole-Trimethoprim]     Sulfa Antibiotics     Telithromycin Diarrhea      Immunizations:     Immunization History   Administered Date(s) Administered    SARS-CoV-2 / COVID-19 mRNA IM (Moderna) 04/07/2021, 05/05/2021      Health Maintenance:         Topic Date Due    Hepatitis C Screening  Never done    Colorectal Cancer Screening  Never done         Topic Date Due    DTaP,Tdap,and Td Vaccines (1 - Tdap) Never done    Pneumococcal Vaccine: 65+ Years (1 of 1 - PPSV23) Never done      Medicare Health Risk Assessment:     Ht 5' 3 5" (1 613 m)   Wt 57 2 kg (126 lb)   BMI 21 97 kg/m²      Rock Chacon is here for her Subsequent Wellness visit  Health Risk Assessment:   Patient rates overall health as good  Patient feels that their physical health rating is same  Patient is satisfied with their life  Eyesight was rated as slightly worse  Hearing was rated as same  Patient feels that their emotional and mental health rating is slightly worse  Patients states they are never, rarely angry  Patient states they are often unusually tired/fatigued  Pain experienced in the last 7 days has been some  Patient's pain rating has been 3/10  Patient states that she has experienced no weight loss or gain in last 6 months  Depression Screening:   PHQ-2 Score: 0      Fall Risk Screening: In the past year, patient has experienced: no history of falling in past year      Urinary Incontinence Screening:   Patient has leaked urine accidently in the last six months  Home Safety:  Patient has trouble with stairs inside or outside of their home  Patient has working smoke alarms and has no working carbon monoxide detector  Home safety hazards include: loose rugs on the floor  Nutrition:   Current diet is Regular  No grapefruit    Medications:   Patient is currently taking over-the-counter supplements  OTC medications include: see medication list  Patient is able to manage medications       Activities of Daily Living (ADLs)/Instrumental Activities of Daily Living (IADLs):   Walk and transfer into and out of bed and chair?: Yes  Dress and groom yourself?: Yes    Bathe or shower yourself?: Yes    Feed yourself? Yes  Do your laundry/housekeeping?: Yes  Manage your money, pay your bills and track your expenses?: Yes  Make your own meals?: Yes    Do your own shopping?: Yes    Previous Hospitalizations:   Any hospitalizations or ED visits within the last 12 months?: Yes    How many hospitalizations have you had in the last year?: 1-2    Advance Care Planning:   Living will: Yes    Durable POA for healthcare: Yes    Advanced directive: Yes      Cognitive Screening:   Provider or family/friend/caregiver concerned regarding cognition?: No    PREVENTIVE SCREENINGS      Cardiovascular Screening:    General: Screening Not Indicated and History Lipid Disorder      Diabetes Screening:     General: Screening Not Indicated and History Diabetes      Cervical Cancer Screening:    General: Screening Not Indicated      Osteoporosis Screening:    General: Screening Not Indicated and History Osteoporosis      Lung Cancer Screening:     General: Screening Not Indicated    Screening, Brief Intervention, and Referral to Treatment (SBIRT)    Screening  Typical number of drinks in a day: 0  Typical number of drinks in a week: 0  Interpretation: Low risk drinking behavior      Single Item Drug Screening:  How often have you used an illegal drug (including marijuana) or a prescription medication for non-medical reasons in the past year? never    Single Item Drug Screen Score: 0  Interpretation: Negative screen for possible drug use disorder      Bertin Valdes MD

## 2021-05-28 ENCOUNTER — TELEPHONE (OUTPATIENT)
Dept: ADMINISTRATIVE | Facility: OTHER | Age: 76
End: 2021-05-28

## 2021-05-28 NOTE — LETTER
Diabetic Eye Exam Form    Date Requested: 21  Patient: Darlina Nissen  Patient : 1945   Referring Provider: Maxwell Hopson MD    Dilated Retinal Exam, Optomap-Iris Exam, or Fundus Photography Done         Yes ([place] one above)         No     Date of Diabetic Eye Exam ______________________________  Left Eye      Exam did show retinopathy    Exam did not show retinopathy         Mild       Moderate       None       Proliferative       Severe     Right Eye     Exam did show retinopathy    Exam did not show retinopathy         Mild       Moderate       None       Proliferative       Severe     Comments __________________________________________________________    Practice Providing Exam ______________________________________________    Exam Performed By (print name) _______________________________________      Provider Signature ___________________________________________________      These reports are needed for  compliance    Please fax this completed form and a copy of the Diabetic Eye Exam report to our office located at [place] [address] as soon as possible to [phone] attention Jackelin: Phone [phone]    We thank you for your assistance in treating our mutual patient     (sent to [facility])

## 2021-05-28 NOTE — TELEPHONE ENCOUNTER
----- Message from Macy Fernandez sent at 5/27/2021  5:03 PM EDT -----  Regarding: care gap request DM eye exam  05/27/21 5:03 PM    Hello, our patient attached above has had Diabetic Eye Exam completed/performed  Please assist in updating the patient chart by making an External outreach to [facility] facility located in [place], [place]  The date of service is recently      Thank you,  Teresa Pickard  [facility] [address] [facility]

## 2021-06-02 NOTE — TELEPHONE ENCOUNTER
Upon review of the In Basket request and the patient's chart, initial outreach has been made via fax, please see Contacts section for details       Thank you  Henny Miner MA

## 2021-06-03 NOTE — TELEPHONE ENCOUNTER
Upon review of the In Basket request we were able to locate, review, and update the patient chart as requested for Diabetic Eye Exam     Any additional questions or concerns should be emailed to the Practice Liaisons via [email or web address] email, please do not reply via In Basket      Thank you  Medina Aguilar MA

## 2021-06-08 DIAGNOSIS — M62.838 MUSCLE SPASM: ICD-10-CM

## 2021-06-08 RX ORDER — CYCLOBENZAPRINE HCL 10 MG
10 TABLET ORAL 3 TIMES DAILY PRN
Qty: 270 TABLET | Refills: 1 | Status: SHIPPED | OUTPATIENT
Start: 2021-06-08 | End: 2021-12-09

## 2021-07-03 DIAGNOSIS — E03.9 HYPOTHYROIDISM, UNSPECIFIED TYPE: ICD-10-CM

## 2021-07-03 RX ORDER — LEVOTHYROXINE SODIUM 88 UG/1
TABLET ORAL
Qty: 90 TABLET | Refills: 0 | Status: SHIPPED | OUTPATIENT
Start: 2021-07-03 | End: 2022-01-03

## 2021-07-07 DIAGNOSIS — E78.2 MIXED HYPERLIPIDEMIA: ICD-10-CM

## 2021-07-07 DIAGNOSIS — R11.2 NAUSEA AND VOMITING, INTRACTABILITY OF VOMITING NOT SPECIFIED, UNSPECIFIED VOMITING TYPE: ICD-10-CM

## 2021-07-07 DIAGNOSIS — L65.9 ALOPECIA: ICD-10-CM

## 2021-07-07 DIAGNOSIS — E11.9 TYPE 2 DIABETES MELLITUS WITHOUT COMPLICATION, WITHOUT LONG-TERM CURRENT USE OF INSULIN (HCC): ICD-10-CM

## 2021-07-07 DIAGNOSIS — I48.20 CHRONIC A-FIB (HCC): ICD-10-CM

## 2021-07-07 DIAGNOSIS — J30.1 ALLERGIC RHINITIS DUE TO POLLEN, UNSPECIFIED SEASONALITY: ICD-10-CM

## 2021-07-07 RX ORDER — SPIRONOLACTONE 25 MG/1
TABLET ORAL
Qty: 90 TABLET | Refills: 1 | Status: SHIPPED | OUTPATIENT
Start: 2021-07-07 | End: 2022-01-17

## 2021-07-07 RX ORDER — DIGOXIN 125 MCG
TABLET ORAL
Qty: 90 TABLET | Refills: 3 | Status: SHIPPED | OUTPATIENT
Start: 2021-07-07

## 2021-07-07 RX ORDER — EZETIMIBE AND SIMVASTATIN 10; 20 MG/1; MG/1
TABLET ORAL
Qty: 90 TABLET | Refills: 1 | Status: SHIPPED | OUTPATIENT
Start: 2021-07-07 | End: 2022-01-17

## 2021-07-07 RX ORDER — APIXABAN 5 MG/1
TABLET, FILM COATED ORAL
Qty: 180 TABLET | Refills: 3 | Status: SHIPPED | OUTPATIENT
Start: 2021-07-07

## 2021-07-07 RX ORDER — SITAGLIPTIN 100 MG/1
TABLET, FILM COATED ORAL
Qty: 90 TABLET | Refills: 1 | Status: SHIPPED | OUTPATIENT
Start: 2021-07-07 | End: 2022-01-17

## 2021-07-07 RX ORDER — DICYCLOMINE HYDROCHLORIDE 10 MG/1
10 CAPSULE ORAL EVERY 6 HOURS PRN
Qty: 360 CAPSULE | Refills: 1 | Status: SHIPPED | OUTPATIENT
Start: 2021-07-07 | End: 2022-01-17

## 2021-07-07 RX ORDER — LEVOCETIRIZINE DIHYDROCHLORIDE 5 MG/1
TABLET, FILM COATED ORAL
Qty: 90 TABLET | Refills: 1 | Status: SHIPPED | OUTPATIENT
Start: 2021-07-07 | End: 2022-01-17

## 2021-07-15 ENCOUNTER — OFFICE VISIT (OUTPATIENT)
Dept: CARDIOLOGY CLINIC | Facility: CLINIC | Age: 76
End: 2021-07-15
Payer: MEDICARE

## 2021-07-15 VITALS
WEIGHT: 124.6 LBS | DIASTOLIC BLOOD PRESSURE: 60 MMHG | HEIGHT: 63 IN | BODY MASS INDEX: 22.08 KG/M2 | HEART RATE: 80 BPM | SYSTOLIC BLOOD PRESSURE: 116 MMHG | OXYGEN SATURATION: 98 %

## 2021-07-15 DIAGNOSIS — N32.81 OAB (OVERACTIVE BLADDER): ICD-10-CM

## 2021-07-15 DIAGNOSIS — I42.9 CARDIOMYOPATHY, UNSPECIFIED TYPE (HCC): ICD-10-CM

## 2021-07-15 DIAGNOSIS — Z79.01 CHRONIC ANTICOAGULATION: ICD-10-CM

## 2021-07-15 DIAGNOSIS — I48.20 CHRONIC ATRIAL FIBRILLATION (HCC): Primary | ICD-10-CM

## 2021-07-15 DIAGNOSIS — I10 ESSENTIAL HYPERTENSION: ICD-10-CM

## 2021-07-15 PROCEDURE — 99214 OFFICE O/P EST MOD 30 MIN: CPT | Performed by: INTERNAL MEDICINE

## 2021-07-15 RX ORDER — OXYBUTYNIN CHLORIDE 10 MG/1
20 TABLET, EXTENDED RELEASE ORAL
Qty: 180 TABLET | Refills: 0 | Status: SHIPPED | OUTPATIENT
Start: 2021-07-15 | End: 2021-07-19 | Stop reason: SDUPTHER

## 2021-07-15 NOTE — PROGRESS NOTES
[facility] [place] [id number] [facility] [address]  Cardiology Follow Up    Zohra Polo  1945  [id number]      1  Chronic atrial fibrillation ([facility] )     2  Essential hypertension     3  Chronic anticoagulation     4  Cardiomyopathy, unspecified type [facility])         Chief Complaint   Patient presents with    Follow-up     6 month follow up       Interval History:   Patient presents for follow-up visit  Patient denies any history of chest pain shortness of breath  Patient denies any history of leg edema or orthopnea PND  No history of presyncope syncope  Patient states compliance with the present list of medications  Patient denies any bleeding issues      Patient Active Problem List   Diagnosis    Atrial fibrillation ([facility] )    Cardiomyopathy [facility]    Hypertension    Hyperlipidemia    Chest pain    Pelvic pain    Ankle joint effusion    Anxiety    Arthritis    Type 2 diabetes mellitus with diabetic peripheral angiopathy without gangrene ([facility] )    Irritable bowel syndrome    Mitral valvular disorder    Neuropathy    Peripheral vascular disease (HCC)    Reflex sympathetic dystrophy of other specified site    Hypothyroidism    Age related osteoporosis     Past Medical History:   Diagnosis Date    Anxiety disorder     Atrial fibrillation (HCC)     Cardiac arrhythmia     [facility]omyopathy [facility]     Colitis     Diabetes mellitus ([facility] )     Dry eyes     Dry mouth     HTN (hypertension)     Hyperlipidemia     IBS (irritable bowel syndrome)     MVP (mitral valve prolapse)     Osteoporosis     SOB (shortness of breath)     Tachycardia      Social History     Socioeconomic History    Marital status: /Civil Union     Spouse name: Not on file    Number of children: Not on file    Years of education: Not on file    Highest education level: Not on file   Occupational History    Occupation: Retired   Tobacco Use    Smoking status: Never Smoker    Smokeless tobacco: Never Used   Vaping Use    Vaping Use: Never used   Substance and Sexual Activity    Alcohol use: No    Drug use: Never    Sexual activity: Not Currently   Other Topics Concern    Not on file   Social History Narrative    · Most recent tobacco use screenin2019      · Do you currently or have you served in the [employer]:   No      · Were you activated, into active duty, as a member of the [employer] or as a Reservist:   No      · Sexual orientation:   Heterosexual      · Diet:   Diabetic       · Caffeine intake:   Occasional      · Seat belts used routinely:   Yes      · Sunscreen used routinely:   No      · Smoke alarm in home: Yes      · Advance directive: Yes      · Salt Intake:   not much      Social Determinants of Health     Financial Resource Strain:     Difficulty of Paying Living Expenses:    Food Insecurity:     Worried About Running Out of Food in the Last Year:     [address] in the Last Year:    Transportation Needs:     Lack of Transportation (Medical):      Lack of Transportation (Non-Medical):    Physical Activity:     Days of Exercise per Week:     Minutes of Exercise per Session:    Stress:     Feeling of Stress :    Social Connections:     Frequency of Communication with Friends and Family:     Frequency of Social Gatherings with Friends and Family:     Attends [employer] Services:     Active Member of Clubs or Organizations:     Attends Club or Organization Meetings:     Marital Status:    Intimate Partner Violence:     Fear of Current or Ex-Partner:     Emotionally Abused:     Physically Abused:     Sexually Abused:       Family History   Problem Relation Age of Onset    Heart attack Mother     Diabetes Mother     Heart attack Father     Stomach cancer Sister      Past Surgical History:   Procedure Laterality Date    CATARACT EXTRACTION Bilateral     DILATION AND CURETTAGE OF UTERUS      x2    WRIST SURGERY         Current Outpatient Medications:     Cholecalciferol (VITAMIN D3 PO), Vitamin D3, Disp: , Rfl:     cyclobenzaprine (FLEXERIL) 10 mg tablet, Take 1 tablet (10 mg total) by mouth 3 (three) times a day as needed for muscle spasms, Disp: 270 tablet, Rfl: 1    denosumab (PROLIA) 60 mg/mL, Inject 60 mg under the skin every 6 (six) months, Disp: , Rfl:     dicyclomine (BENTYL) 10 mg capsule, TAKE 1 CAPSULE (10 MG TOTAL) BY MOUTH EVERY 6 (SIX) HOURS AS NEEDED (NAUSEA), Disp: 360 capsule, Rfl: 1    digoxin (LANOXIN) 0 125 mg tablet, TAKE 1 TABLET BY MOUTH EVERY DAY, Disp: 90 tablet, Rfl: 3    Eliquis 5 MG, TAKE 1 TABLET BY MOUTH TWICE A DAY, Disp: 180 tablet, Rfl: 3    ezetimibe-simvastatin (VYTORIN) 10-20 mg per tablet, TAKE 1 TABLET BY MOUTH EVERYDAY AT BEDTIME, Disp: 90 tablet, Rfl: 1    Januvia 100 MG tablet, TAKE 1 TABLET BY MOUTH EVERY DAY, Disp: 90 tablet, Rfl: 1    levocetirizine (XYZAL) 5 MG tablet, TAKE 1 TABLET BY MOUTH EVERY DAY IN THE EVENING, Disp: 90 tablet, Rfl: 1    levothyroxine 88 mcg tablet, TAKE 1 TABLET BY MOUTH EVERY DAY, Disp: 90 tablet, Rfl: 0    Linaclotide (LINZESS) 145 MCG CAPS, Take by mouth every 8 (eight) hours, Disp: , Rfl:     lisinopril (ZESTRIL) 2 5 mg tablet, TAKE 1 TABLET BY MOUTH EVERY DAY, Disp: 90 tablet, Rfl: 2    loteprednol etabonate (LOTEMAX) 0 5 % ophthalmic suspension, Apply to eye, Disp: , Rfl:     metFORMIN (GLUCOPHAGE) 500 mg tablet, TAKE 2 TABLETS BY MOUTH TWICE A DAY, Disp: 360 tablet, Rfl: 1    metoprolol tartrate (LOPRESSOR) 100 mg tablet, Take 1 tablet (100 mg total) by mouth every 12 (twelve) hours, Disp: 180 tablet, Rfl: 3    Multiple Vitamins-Minerals (CENTRUM SILVER PO), Take 1 tablet by mouth daily, Disp: , Rfl:     nitroglycerin (NITROSTAT) 0 4 mg SL tablet, Place under the tongue, Disp: , Rfl:     ondansetron (ZOFRAN) 8 mg tablet, Take 0 5 tablets (4 mg total) by mouth every 8 (eight) hours as needed for nausea or vomiting, Disp: 270 tablet, Rfl: 1   ONDANSETRON PO, Take by mouth as needed, Disp: , Rfl:     oxybutynin (DITROPAN-XL) 10 MG 24 hr tablet, TAKE 2 TABLETS (20 MG TOTAL) BY MOUTH DAILY AT BEDTIME, Disp: 180 tablet, Rfl: 0    pantoprazole (PROTONIX) 40 mg tablet, Take 1 tablet (40 mg total) by mouth 2 (two) times a day, Disp: 180 tablet, Rfl: 3    senna (SENOKOT) 8 6 MG tablet, Take 1 tablet by mouth daily, Disp: , Rfl:     spironolactone (ALDACTONE) 25 mg tablet, TAKE 1 TABLET BY MOUTH EVERY DAY, Disp: 90 tablet, Rfl: 1    terconazole (TERAZOL 7) 0 4 % vaginal cream, Insert into the vagina, Disp: , Rfl:     diclofenac sodium (VOLTAREN) 1 %, Place on the skin (Patient not taking: Reported on 7/15/2021), Disp: , Rfl:     nitrofurantoin (MACROBID) 100 mg capsule, Take 1 capsule (100 mg total) by mouth 2 (two) times a day (Patient not taking: Reported on 12/4/2020), Disp: 14 capsule, Rfl: 0    oxyCODONE-acetaminophen (PERCOCET) 5-325 mg per tablet, Take 1 tablet by mouth every 4 (four) hours as needed for moderate pain for up to 15 dosesMax Daily Amount: 6 tablets (Patient not taking: Reported on 5/27/2021), Disp: 15 tablet, Rfl: 0  Allergies   Allergen Reactions    Butoconazole     Moxifloxacin     Neomycin-Bacitracin Zn-Polymyx     Smz-Tmp Ds [Sulfamethoxazole-Trimethoprim]     Sulfa Antibiotics     Telithromycin Diarrhea       Labs:  Telephone on 05/28/2021   Component Date Value    Right Eye Diabetic Retin* 01/08/2021 None     Left Eye Diabetic Retino* 01/08/2021 None      Imaging: No results found      Review of Systems:  Review of Systems     REVIEW OF SYSTEMS:  Constitutional:  Denies fever or chills   Eyes:  Denies change in visual acuity   HENT:  Denies nasal congestion or sore throat   Respiratory:   shortness of breath   Cardiovascular:  Denies chest pain or edema   GI:  Denies abdominal pain, nausea, vomiting, bloody stools or diarrhea   :  Denies dysuria, frequency, difficulty in micturition and nocturia  Musculoskeletal: Denies back pain or joint pain   Neurologic:  Denies headache, focal weakness or sensory changes   Endocrine:  Denies polyuria or polydipsia   Lymphatic:  Denies swollen glands   Psychiatric:  Denies depression or anxiety     Physical Exam:    /60   Pulse 80   Ht 5' 3 3" (1 608 m)   Wt 56 5 kg (124 lb 9 6 oz)   SpO2 98%   BMI 21 86 kg/m²     Physical Exam   PHYSICAL EXAM:  General:  Patient is not in acute distress   Head: Normocephalic, Atraumatic  HEENT:  Both pupils normal-size atraumatic, normocephalic, nonicteric  Neck:  JVP not raised  Trachea central  No carotid bruit  Respiratory:  normal breath sounds no crackles  no rhonchi  Cardiovascular:   Irregularly irregular  GI:  Abdomen soft nontender  No organomegaly  Lymphatic:  No cervical or inguinal lymphadenopathy  Neurologic:  Patient is awake alert, oriented   Grossly nonfocal    Extremities no edema    Discussion/Summary:   Patient with multiple medical problems who seems to be doing reasonably well from cardiac standpoint  Previous studies reviewed with patient  Medications reviewed and possible side effects discussed  concepts of cardiovascular disease , signs and symptoms of heart disease  Dietary and risk factor modification reinforced  All questions answered  Safety measures reviewed  Patient advised to report any problems prompting medical attention  Risks and benefits  and alternativesof anticoagulation to prevent thromboembolic risk from atrial fibrillation discussed at length  Patient to report any bleeding issues  previous cardiovascular studies reviewed  Importance of salt restriction reinforced with the patient  Medications reviewed  Patient had a few questions which were answered  Follow-up in 6 months or earlier as needed  Patient is agreeable with the plan of care

## 2021-07-19 DIAGNOSIS — N32.81 OAB (OVERACTIVE BLADDER): ICD-10-CM

## 2021-07-19 RX ORDER — OXYBUTYNIN CHLORIDE 10 MG/1
20 TABLET, EXTENDED RELEASE ORAL
Qty: 180 TABLET | Refills: 0 | Status: SHIPPED | OUTPATIENT
Start: 2021-07-19 | End: 2021-12-22

## 2021-08-18 ENCOUNTER — APPOINTMENT (OUTPATIENT)
Dept: LAB | Facility: CLINIC | Age: 76
End: 2021-08-18
Payer: MEDICARE

## 2021-08-18 DIAGNOSIS — E11.51 TYPE 2 DIABETES MELLITUS WITH DIABETIC PERIPHERAL ANGIOPATHY WITHOUT GANGRENE, WITHOUT LONG-TERM CURRENT USE OF INSULIN (HCC): ICD-10-CM

## 2021-08-18 DIAGNOSIS — E78.2 MIXED HYPERLIPIDEMIA: ICD-10-CM

## 2021-08-18 LAB
ALBUMIN SERPL BCP-MCNC: 4.1 G/DL (ref 3.5–5)
ALP SERPL-CCNC: 64 U/L (ref 46–116)
ALT SERPL W P-5'-P-CCNC: 21 U/L (ref 12–78)
ANION GAP SERPL CALCULATED.3IONS-SCNC: 3 MMOL/L (ref 4–13)
AST SERPL W P-5'-P-CCNC: 15 U/L (ref 5–45)
BILIRUB SERPL-MCNC: 0.58 MG/DL (ref 0.2–1)
BUN SERPL-MCNC: 27 MG/DL (ref 5–25)
CALCIUM SERPL-MCNC: 9.3 MG/DL (ref 8.3–10.1)
CHLORIDE SERPL-SCNC: 105 MMOL/L (ref 100–108)
CHOLEST SERPL-MCNC: 143 MG/DL (ref 50–200)
CO2 SERPL-SCNC: 28 MMOL/L (ref 21–32)
CREAT SERPL-MCNC: 1.51 MG/DL (ref 0.6–1.3)
EST. AVERAGE GLUCOSE BLD GHB EST-MCNC: 160 MG/DL
GFR SERPL CREATININE-BSD FRML MDRD: 34 ML/MIN/1.73SQ M
GLUCOSE P FAST SERPL-MCNC: 137 MG/DL (ref 65–99)
HBA1C MFR BLD: 7.2 %
HDLC SERPL-MCNC: 43 MG/DL
LDLC SERPL CALC-MCNC: 60 MG/DL (ref 0–100)
POTASSIUM SERPL-SCNC: 5.2 MMOL/L (ref 3.5–5.3)
PROT SERPL-MCNC: 7.7 G/DL (ref 6.4–8.2)
SODIUM SERPL-SCNC: 136 MMOL/L (ref 136–145)
TRIGL SERPL-MCNC: 201 MG/DL

## 2021-08-18 PROCEDURE — 80053 COMPREHEN METABOLIC PANEL: CPT

## 2021-08-18 PROCEDURE — 36415 COLL VENOUS BLD VENIPUNCTURE: CPT

## 2021-08-18 PROCEDURE — 83036 HEMOGLOBIN GLYCOSYLATED A1C: CPT

## 2021-08-18 PROCEDURE — 80061 LIPID PANEL: CPT

## 2021-11-17 ENCOUNTER — OFFICE VISIT (OUTPATIENT)
Dept: FAMILY MEDICINE CLINIC | Facility: CLINIC | Age: 76
End: 2021-11-17
Payer: MEDICARE

## 2021-11-17 VITALS
TEMPERATURE: 96.4 F | BODY MASS INDEX: 21.34 KG/M2 | WEIGHT: 125 LBS | HEART RATE: 82 BPM | OXYGEN SATURATION: 98 % | HEIGHT: 64 IN | DIASTOLIC BLOOD PRESSURE: 68 MMHG | SYSTOLIC BLOOD PRESSURE: 120 MMHG | RESPIRATION RATE: 18 BRPM

## 2021-11-17 DIAGNOSIS — I48.91 ATRIAL FIBRILLATION, UNSPECIFIED TYPE (HCC): ICD-10-CM

## 2021-11-17 DIAGNOSIS — I10 PRIMARY HYPERTENSION: ICD-10-CM

## 2021-11-17 DIAGNOSIS — Z12.11 SCREENING FOR COLORECTAL CANCER: ICD-10-CM

## 2021-11-17 DIAGNOSIS — G62.9 NEUROPATHY: ICD-10-CM

## 2021-11-17 DIAGNOSIS — Z12.12 SCREENING FOR COLORECTAL CANCER: ICD-10-CM

## 2021-11-17 DIAGNOSIS — M81.0 AGE RELATED OSTEOPOROSIS, UNSPECIFIED PATHOLOGICAL FRACTURE PRESENCE: ICD-10-CM

## 2021-11-17 DIAGNOSIS — E11.51 TYPE 2 DIABETES MELLITUS WITH DIABETIC PERIPHERAL ANGIOPATHY WITHOUT GANGRENE, WITHOUT LONG-TERM CURRENT USE OF INSULIN (HCC): Primary | ICD-10-CM

## 2021-11-17 DIAGNOSIS — E78.2 MIXED HYPERLIPIDEMIA: ICD-10-CM

## 2021-11-17 DIAGNOSIS — Z12.31 SCREENING MAMMOGRAM FOR HIGH-RISK PATIENT: ICD-10-CM

## 2021-11-17 DIAGNOSIS — R11.2 NAUSEA AND VOMITING, INTRACTABILITY OF VOMITING NOT SPECIFIED, UNSPECIFIED VOMITING TYPE: ICD-10-CM

## 2021-11-17 DIAGNOSIS — Z11.59 NEED FOR HEPATITIS C SCREENING TEST: ICD-10-CM

## 2021-11-17 DIAGNOSIS — E03.4 HYPOTHYROIDISM DUE TO ACQUIRED ATROPHY OF THYROID: ICD-10-CM

## 2021-11-17 DIAGNOSIS — F41.9 ANXIETY: ICD-10-CM

## 2021-11-17 PROCEDURE — 99214 OFFICE O/P EST MOD 30 MIN: CPT | Performed by: FAMILY MEDICINE

## 2021-11-17 RX ORDER — ONDANSETRON HYDROCHLORIDE 8 MG/1
4 TABLET, FILM COATED ORAL EVERY 8 HOURS PRN
Qty: 270 TABLET | Refills: 1 | Status: SHIPPED | OUTPATIENT
Start: 2021-11-17

## 2021-12-09 DIAGNOSIS — I48.20 CHRONIC A-FIB (HCC): ICD-10-CM

## 2021-12-09 DIAGNOSIS — M62.838 MUSCLE SPASM: ICD-10-CM

## 2021-12-09 RX ORDER — METOPROLOL TARTRATE 100 MG/1
TABLET ORAL
Qty: 180 TABLET | Refills: 3 | Status: SHIPPED | OUTPATIENT
Start: 2021-12-09

## 2021-12-09 RX ORDER — CYCLOBENZAPRINE HCL 10 MG
TABLET ORAL
Qty: 270 TABLET | Refills: 1 | Status: SHIPPED | OUTPATIENT
Start: 2021-12-09

## 2021-12-15 ENCOUNTER — IMMUNIZATIONS (OUTPATIENT)
Dept: FAMILY MEDICINE CLINIC | Facility: HOSPITAL | Age: 76
End: 2021-12-15

## 2021-12-15 DIAGNOSIS — Z23 ENCOUNTER FOR IMMUNIZATION: Primary | ICD-10-CM

## 2021-12-15 PROCEDURE — 0064A COVID-19 MODERNA VACC 0.25 ML BOOSTER: CPT

## 2021-12-15 PROCEDURE — 91306 COVID-19 MODERNA VACC 0.25 ML BOOSTER: CPT

## 2021-12-22 DIAGNOSIS — N32.81 OAB (OVERACTIVE BLADDER): ICD-10-CM

## 2021-12-22 RX ORDER — OXYBUTYNIN CHLORIDE 10 MG/1
20 TABLET, EXTENDED RELEASE ORAL
Qty: 180 TABLET | Refills: 0 | Status: SHIPPED | OUTPATIENT
Start: 2021-12-22 | End: 2022-03-28

## 2022-01-03 DIAGNOSIS — E03.9 HYPOTHYROIDISM, UNSPECIFIED TYPE: ICD-10-CM

## 2022-01-03 RX ORDER — LEVOTHYROXINE SODIUM 88 UG/1
TABLET ORAL
Qty: 90 TABLET | Refills: 0 | Status: SHIPPED | OUTPATIENT
Start: 2022-01-03 | End: 2022-04-07

## 2022-01-14 ENCOUNTER — APPOINTMENT (OUTPATIENT)
Dept: LAB | Facility: CLINIC | Age: 77
End: 2022-01-14
Payer: MEDICARE

## 2022-01-14 DIAGNOSIS — E11.51 TYPE 2 DIABETES MELLITUS WITH DIABETIC PERIPHERAL ANGIOPATHY WITHOUT GANGRENE, WITHOUT LONG-TERM CURRENT USE OF INSULIN (HCC): ICD-10-CM

## 2022-01-14 DIAGNOSIS — I10 PRIMARY HYPERTENSION: ICD-10-CM

## 2022-01-14 DIAGNOSIS — E78.2 MIXED HYPERLIPIDEMIA: ICD-10-CM

## 2022-01-14 DIAGNOSIS — Z11.59 NEED FOR HEPATITIS C SCREENING TEST: ICD-10-CM

## 2022-01-14 LAB
ALBUMIN SERPL BCP-MCNC: 4.3 G/DL (ref 3.5–5)
ALP SERPL-CCNC: 73 U/L (ref 46–116)
ALT SERPL W P-5'-P-CCNC: 21 U/L (ref 12–78)
ANION GAP SERPL CALCULATED.3IONS-SCNC: 4 MMOL/L (ref 4–13)
AST SERPL W P-5'-P-CCNC: 20 U/L (ref 5–45)
BACTERIA UR QL AUTO: ABNORMAL /HPF
BASOPHILS # BLD AUTO: 0.07 THOUSANDS/ΜL (ref 0–0.1)
BASOPHILS NFR BLD AUTO: 1 % (ref 0–1)
BILIRUB SERPL-MCNC: 0.6 MG/DL (ref 0.2–1)
BILIRUB UR QL STRIP: NEGATIVE
BUN SERPL-MCNC: 19 MG/DL (ref 5–25)
CALCIUM SERPL-MCNC: 10 MG/DL (ref 8.3–10.1)
CHLORIDE SERPL-SCNC: 101 MMOL/L (ref 100–108)
CHOLEST SERPL-MCNC: 168 MG/DL
CLARITY UR: CLEAR
CO2 SERPL-SCNC: 32 MMOL/L (ref 21–32)
COLOR UR: YELLOW
CREAT SERPL-MCNC: 1.18 MG/DL (ref 0.6–1.3)
EOSINOPHIL # BLD AUTO: 0.42 THOUSAND/ΜL (ref 0–0.61)
EOSINOPHIL NFR BLD AUTO: 5 % (ref 0–6)
ERYTHROCYTE [DISTWIDTH] IN BLOOD BY AUTOMATED COUNT: 12.3 % (ref 11.6–15.1)
EST. AVERAGE GLUCOSE BLD GHB EST-MCNC: 220 MG/DL
GFR SERPL CREATININE-BSD FRML MDRD: 45 ML/MIN/1.73SQ M
GLUCOSE P FAST SERPL-MCNC: 141 MG/DL (ref 65–99)
GLUCOSE UR STRIP-MCNC: NEGATIVE MG/DL
HBA1C MFR BLD: 9.3 %
HCT VFR BLD AUTO: 41.3 % (ref 34.8–46.1)
HDLC SERPL-MCNC: 42 MG/DL
HGB BLD-MCNC: 13.8 G/DL (ref 11.5–15.4)
HGB UR QL STRIP.AUTO: NEGATIVE
HYALINE CASTS #/AREA URNS LPF: ABNORMAL /LPF
IMM GRANULOCYTES # BLD AUTO: 0.01 THOUSAND/UL (ref 0–0.2)
IMM GRANULOCYTES NFR BLD AUTO: 0 % (ref 0–2)
KETONES UR STRIP-MCNC: NEGATIVE MG/DL
LDLC SERPL CALC-MCNC: 77 MG/DL (ref 0–100)
LEUKOCYTE ESTERASE UR QL STRIP: ABNORMAL
LYMPHOCYTES # BLD AUTO: 3.76 THOUSANDS/ΜL (ref 0.6–4.47)
LYMPHOCYTES NFR BLD AUTO: 40 % (ref 14–44)
MAGNESIUM SERPL-MCNC: 1.8 MG/DL (ref 1.6–2.6)
MCH RBC QN AUTO: 32.7 PG (ref 26.8–34.3)
MCHC RBC AUTO-ENTMCNC: 33.4 G/DL (ref 31.4–37.4)
MCV RBC AUTO: 98 FL (ref 82–98)
MONOCYTES # BLD AUTO: 0.79 THOUSAND/ΜL (ref 0.17–1.22)
MONOCYTES NFR BLD AUTO: 8 % (ref 4–12)
NEUTROPHILS # BLD AUTO: 4.35 THOUSANDS/ΜL (ref 1.85–7.62)
NEUTS SEG NFR BLD AUTO: 46 % (ref 43–75)
NITRITE UR QL STRIP: NEGATIVE
NON-SQ EPI CELLS URNS QL MICRO: ABNORMAL /HPF
NRBC BLD AUTO-RTO: 0 /100 WBCS
PH UR STRIP.AUTO: 8 [PH]
PLATELET # BLD AUTO: 225 THOUSANDS/UL (ref 149–390)
PMV BLD AUTO: 10.6 FL (ref 8.9–12.7)
POTASSIUM SERPL-SCNC: 4.4 MMOL/L (ref 3.5–5.3)
PROT SERPL-MCNC: 8.3 G/DL (ref 6.4–8.2)
PROT UR STRIP-MCNC: NEGATIVE MG/DL
RBC # BLD AUTO: 4.22 MILLION/UL (ref 3.81–5.12)
RBC #/AREA URNS AUTO: ABNORMAL /HPF
SODIUM SERPL-SCNC: 137 MMOL/L (ref 136–145)
SP GR UR STRIP.AUTO: 1.02 (ref 1–1.03)
T4 FREE SERPL-MCNC: 1.13 NG/DL (ref 0.76–1.46)
TRIGL SERPL-MCNC: 246 MG/DL
TSH SERPL DL<=0.05 MIU/L-ACNC: 1.37 UIU/ML (ref 0.36–3.74)
URATE SERPL-MCNC: 5.1 MG/DL (ref 2–6.8)
UROBILINOGEN UR QL STRIP.AUTO: 0.2 E.U./DL
WBC # BLD AUTO: 9.4 THOUSAND/UL (ref 4.31–10.16)
WBC #/AREA URNS AUTO: ABNORMAL /HPF

## 2022-01-14 PROCEDURE — 84439 ASSAY OF FREE THYROXINE: CPT

## 2022-01-14 PROCEDURE — 81001 URINALYSIS AUTO W/SCOPE: CPT | Performed by: FAMILY MEDICINE

## 2022-01-14 PROCEDURE — 80053 COMPREHEN METABOLIC PANEL: CPT

## 2022-01-14 PROCEDURE — 83735 ASSAY OF MAGNESIUM: CPT

## 2022-01-14 PROCEDURE — 85025 COMPLETE CBC W/AUTO DIFF WBC: CPT

## 2022-01-14 PROCEDURE — 80061 LIPID PANEL: CPT

## 2022-01-14 PROCEDURE — 84443 ASSAY THYROID STIM HORMONE: CPT

## 2022-01-14 PROCEDURE — 36415 COLL VENOUS BLD VENIPUNCTURE: CPT

## 2022-01-14 PROCEDURE — 86803 HEPATITIS C AB TEST: CPT

## 2022-01-14 PROCEDURE — 84550 ASSAY OF BLOOD/URIC ACID: CPT

## 2022-01-14 PROCEDURE — 83036 HEMOGLOBIN GLYCOSYLATED A1C: CPT

## 2022-01-15 DIAGNOSIS — E11.9 TYPE 2 DIABETES MELLITUS WITHOUT COMPLICATION, WITHOUT LONG-TERM CURRENT USE OF INSULIN (HCC): ICD-10-CM

## 2022-01-15 DIAGNOSIS — L65.9 ALOPECIA: ICD-10-CM

## 2022-01-15 DIAGNOSIS — E78.2 MIXED HYPERLIPIDEMIA: ICD-10-CM

## 2022-01-15 DIAGNOSIS — J30.1 ALLERGIC RHINITIS DUE TO POLLEN, UNSPECIFIED SEASONALITY: ICD-10-CM

## 2022-01-15 DIAGNOSIS — R11.2 NAUSEA AND VOMITING, INTRACTABILITY OF VOMITING NOT SPECIFIED, UNSPECIFIED VOMITING TYPE: ICD-10-CM

## 2022-01-15 DIAGNOSIS — I10 HYPERTENSION, ESSENTIAL: ICD-10-CM

## 2022-01-15 LAB — HCV AB SER QL: NORMAL

## 2022-01-17 RX ORDER — LEVOCETIRIZINE DIHYDROCHLORIDE 5 MG/1
TABLET, FILM COATED ORAL
Qty: 90 TABLET | Refills: 1 | Status: SHIPPED | OUTPATIENT
Start: 2022-01-17

## 2022-01-17 RX ORDER — SITAGLIPTIN 100 MG/1
TABLET, FILM COATED ORAL
Qty: 90 TABLET | Refills: 1 | Status: SHIPPED | OUTPATIENT
Start: 2022-01-17

## 2022-01-17 RX ORDER — EZETIMIBE AND SIMVASTATIN 10; 20 MG/1; MG/1
TABLET ORAL
Qty: 90 TABLET | Refills: 1 | Status: SHIPPED | OUTPATIENT
Start: 2022-01-17

## 2022-01-17 RX ORDER — DICYCLOMINE HYDROCHLORIDE 10 MG/1
10 CAPSULE ORAL EVERY 6 HOURS PRN
Qty: 360 CAPSULE | Refills: 1 | Status: SHIPPED | OUTPATIENT
Start: 2022-01-17

## 2022-01-17 RX ORDER — SPIRONOLACTONE 25 MG/1
TABLET ORAL
Qty: 90 TABLET | Refills: 1 | Status: SHIPPED | OUTPATIENT
Start: 2022-01-17

## 2022-01-17 RX ORDER — LISINOPRIL 2.5 MG/1
TABLET ORAL
Qty: 90 TABLET | Refills: 2 | Status: SHIPPED | OUTPATIENT
Start: 2022-01-17

## 2022-03-17 NOTE — TELEPHONE ENCOUNTER
Refill pending     Previously sent to [facility] dilaudid 0.5 mg ivp every 3 hr ATC w prns for breakthrough dyspnea  O2 suppl via NC for comfort

## 2022-03-26 DIAGNOSIS — N32.81 OAB (OVERACTIVE BLADDER): ICD-10-CM

## 2022-03-28 RX ORDER — OXYBUTYNIN CHLORIDE 10 MG/1
20 TABLET, EXTENDED RELEASE ORAL
Qty: 180 TABLET | Refills: 0 | Status: SHIPPED | OUTPATIENT
Start: 2022-03-28

## 2022-04-07 DIAGNOSIS — E03.9 HYPOTHYROIDISM, UNSPECIFIED TYPE: ICD-10-CM

## 2022-04-07 RX ORDER — LEVOTHYROXINE SODIUM 88 UG/1
TABLET ORAL
Qty: 90 TABLET | Refills: 0 | Status: SHIPPED | OUTPATIENT
Start: 2022-04-07

## 2022-07-06 DIAGNOSIS — R10.9 ABDOMINAL PAIN, UNSPECIFIED ABDOMINAL LOCATION: ICD-10-CM

## 2022-07-06 RX ORDER — PANTOPRAZOLE SODIUM 40 MG/1
TABLET, DELAYED RELEASE ORAL
Qty: 180 TABLET | Refills: 3 | Status: SHIPPED | OUTPATIENT
Start: 2022-07-06

## 2022-09-12 DIAGNOSIS — J30.1 ALLERGIC RHINITIS DUE TO POLLEN, UNSPECIFIED SEASONALITY: ICD-10-CM

## 2022-09-12 DIAGNOSIS — N32.81 OAB (OVERACTIVE BLADDER): ICD-10-CM

## 2022-09-12 DIAGNOSIS — E03.9 HYPOTHYROIDISM, UNSPECIFIED TYPE: ICD-10-CM

## 2022-09-12 DIAGNOSIS — E11.9 TYPE 2 DIABETES MELLITUS WITHOUT COMPLICATION, WITHOUT LONG-TERM CURRENT USE OF INSULIN (HCC): ICD-10-CM

## 2022-09-12 DIAGNOSIS — R11.2 NAUSEA AND VOMITING: ICD-10-CM

## 2022-09-12 DIAGNOSIS — E78.2 MIXED HYPERLIPIDEMIA: ICD-10-CM

## 2022-09-12 DIAGNOSIS — I48.20 CHRONIC A-FIB (HCC): ICD-10-CM

## 2022-09-12 DIAGNOSIS — L65.9 ALOPECIA: ICD-10-CM

## 2022-09-12 RX ORDER — DICYCLOMINE HYDROCHLORIDE 10 MG/1
10 CAPSULE ORAL EVERY 6 HOURS PRN
Qty: 360 CAPSULE | Refills: 1 | Status: SHIPPED | OUTPATIENT
Start: 2022-09-12

## 2022-09-12 RX ORDER — DIGOXIN 125 MCG
TABLET ORAL
Qty: 90 TABLET | Refills: 3 | Status: SHIPPED | OUTPATIENT
Start: 2022-09-12

## 2022-09-12 RX ORDER — OXYBUTYNIN CHLORIDE 10 MG/1
20 TABLET, EXTENDED RELEASE ORAL
Qty: 180 TABLET | Refills: 0 | Status: SHIPPED | OUTPATIENT
Start: 2022-09-12 | End: 2022-10-21

## 2022-09-12 RX ORDER — SITAGLIPTIN 100 MG/1
TABLET, FILM COATED ORAL
Qty: 90 TABLET | Refills: 1 | Status: SHIPPED | OUTPATIENT
Start: 2022-09-12

## 2022-09-12 RX ORDER — LEVOTHYROXINE SODIUM 88 UG/1
TABLET ORAL
Qty: 90 TABLET | Refills: 0 | Status: SHIPPED | OUTPATIENT
Start: 2022-09-12 | End: 2022-10-21

## 2022-09-12 RX ORDER — SPIRONOLACTONE 25 MG/1
TABLET ORAL
Qty: 90 TABLET | Refills: 1 | Status: SHIPPED | OUTPATIENT
Start: 2022-09-12

## 2022-09-12 RX ORDER — LEVOCETIRIZINE DIHYDROCHLORIDE 5 MG/1
TABLET, FILM COATED ORAL
Qty: 90 TABLET | Refills: 1 | Status: SHIPPED | OUTPATIENT
Start: 2022-09-12

## 2022-09-12 RX ORDER — EZETIMIBE AND SIMVASTATIN 10; 20 MG/1; MG/1
TABLET ORAL
Qty: 90 TABLET | Refills: 1 | Status: SHIPPED | OUTPATIENT
Start: 2022-09-12

## 2022-09-12 RX ORDER — APIXABAN 5 MG/1
TABLET, FILM COATED ORAL
Qty: 180 TABLET | Refills: 3 | Status: SHIPPED | OUTPATIENT
Start: 2022-09-12

## 2022-10-21 DIAGNOSIS — N32.81 OAB (OVERACTIVE BLADDER): ICD-10-CM

## 2022-10-21 DIAGNOSIS — I10 HYPERTENSION, ESSENTIAL: ICD-10-CM

## 2022-10-21 DIAGNOSIS — E03.9 HYPOTHYROIDISM, UNSPECIFIED TYPE: ICD-10-CM

## 2022-10-21 DIAGNOSIS — M62.838 MUSCLE SPASM: ICD-10-CM

## 2022-10-21 RX ORDER — OXYBUTYNIN CHLORIDE 10 MG/1
20 TABLET, EXTENDED RELEASE ORAL
Qty: 180 TABLET | Refills: 0 | Status: SHIPPED | OUTPATIENT
Start: 2022-10-21

## 2022-10-21 RX ORDER — CYCLOBENZAPRINE HCL 10 MG
TABLET ORAL
Qty: 270 TABLET | Refills: 1 | Status: SHIPPED | OUTPATIENT
Start: 2022-10-21

## 2022-10-21 RX ORDER — LISINOPRIL 2.5 MG/1
TABLET ORAL
Qty: 90 TABLET | Refills: 2 | Status: SHIPPED | OUTPATIENT
Start: 2022-10-21

## 2022-10-21 RX ORDER — LEVOTHYROXINE SODIUM 88 UG/1
TABLET ORAL
Qty: 90 TABLET | Refills: 0 | Status: SHIPPED | OUTPATIENT
Start: 2022-10-21

## 2022-10-21 NOTE — TELEPHONE ENCOUNTER
Lisinopril 2 5mg  Dispense:90 Refills:2    Cardiovascular:  ACE Inhibitors Failed 10/21/2022 09:24 AM   Protocol Details  BP completed in the last 6 months    eGFR is 60 or above and within 360 days    Valid encounter within last 6 months

## 2022-11-20 NOTE — PROGRESS NOTES
Name: Marianela Mari      : 1945      MRN: [id number]  Encounter Provider: Tonia Martinez MD  Encounter Date: 2022   Encounter department: [address] [facility] [address]     1  Well adult exam    2  Hypothyroidism due to acquired atrophy of thyroid  Assessment & Plan:  Patient to continue current dose of thyroid medicine and recheck TSH in 6 months      3  Type 2 diabetes mellitus with diabetic peripheral angiopathy without gangrene, without long-term current use of insulin (HCC)  Assessment & Plan:  Needs A1C  Lab Results   Component Value Date    HGBA1C 9 3 (H) 2022       Orders:  -     Hemoglobin A1C; Future  -     Microalbumin,Urine    4  Atrial fibrillation, unspecified type [facility])  Assessment & Plan:  Continue with anticogulation and rate control of heart rate  Concerns about condition were addressed today  5  Primary hypertension  Assessment & Plan:  Patient is stable with current anti-hypertensive medicine and continue to follow a low sodium diet and take current medication  All questions about this condition were answered today  Orders:  -     TSH + Free T4; Future  -     Comprehensive metabolic panel; Future  -     CBC and differential; Future  -     Magnesium; Future  -     Uric acid; Future  -     Urinalysis with microscopic    6  Anxiety  Assessment & Plan:  Patient to continue utilizing medical therapy as well as counseling sources as applicable to condition  If suicidal thought or fear of suicide attempt, to call 911 and seek help immediately  Medications and therapy reviewed today and all patient  questions answered today  7  Mixed hyperlipidemia  Assessment & Plan:  Patient  is stable with current medication and we discussed a low fat low cholesterol diet  Weight loss also discussed for this will help lower cholesterol also  Recheck lipids in 6 months  Orders:  -     Lipid Panel with Direct LDL reflex; Future    8   Screening mammogram for high-risk patient  -     Mammo screening bilateral w 3d & cad; Future; Expected date: 11/21/2022    9  Encounter for immunization    10  Menopause  -     DXA bone density spine hip and pelvis; Future; Expected date: 11/21/2022    11  Age-related osteoporosis without current pathological fracture  Assessment & Plan:  Patient to continue with weight bearing exercises as much as possible and oral intake of 1200 mg of calcium with 800 IU of Vit D to maximize bone protection  Pt  to continue  with DEXA scan every 2 years to reassess  All qustions regarding this problem answered today to patient satisfaction  12  Stage 3a chronic kidney disease ([facility] )    13  Cardiomyopathy, unspecified type ([facility] )      BMI Counseling: Body mass index is 21 8 kg/m²  Follow-up plan was not completed due to patient receiving palliative care  Falls Plan of Care: balance, strength, and gait training instructions were provided  Home safety education provided  Urinary Incontinence Plan of Care: counseling topics discussed: practice Kegel (pelvic floor strengthening) exercises, use restroom every 2 hours, limit alcohol, caffeine, spicy foods, and acidic foods, limiting fluid intake 3-4 hours before bed, weight loss, preventing constipation, improving blood sugar control and limiting fluid intake to 60 oz  per day  Subjective     This is a 44-year-old female here to reestablish care  Patient had unfortunately lost her  last January and had was a quandary about what insurance coverage she has she is now straight that back in and now she is back for evaluation  Patient with type 2 diabetes hypertension history of atrial fibrillation hyperlipidemia hypothyroidism as well as osteoporosis  Patient will get back on her medications and is due for her lab work I will order that for today and she will see about getting her coverage verified for her Prolia and will do her next visit      Review of Systems Constitutional: Negative for activity change, appetite change, fatigue and fever  HENT: Negative for congestion, ear pain, postnasal drip, rhinorrhea, sinus pressure, sinus pain, sneezing and sore throat  Eyes: Negative for pain and redness  Respiratory: Negative for apnea, cough, chest tightness, shortness of breath and wheezing  Cardiovascular: Negative for chest pain, palpitations and leg swelling  Gastrointestinal: Negative for abdominal pain, constipation, diarrhea, nausea and vomiting  Endocrine: Negative for cold intolerance and heat intolerance  Genitourinary: Negative for difficulty urinating, dysuria, frequency, hematuria and urgency  Musculoskeletal: Negative for arthralgias, back pain, gait problem and myalgias  Skin: Negative for rash  Neurological: Negative for dizziness, speech difficulty, weakness, numbness and headaches  Hematological: Does not bruise/bleed easily  Psychiatric/Behavioral: Negative for agitation, confusion and hallucinations  Patient's shoes and socks removed  Right Foot/Ankle   Right Foot Inspection  Skin Exam: skin normal  Skin not intact, no dry skin, no warmth, no callus, no erythema, no maceration, no abnormal color, no pre-ulcer, no ulcer and no callus  Toe Exam: ROM and strength within normal limits  No tenderness    Sensory   Vibration: intact  Proprioception: intact  Monofilament testing: intact    Vascular  Capillary refills: < 3 seconds  The right DP pulse is 2+  The right PT pulse is 2+  Left Foot/Ankle  Left Foot Inspection  Skin Exam: Skin not intact, no dry skin, no warmth, no erythema, no maceration, normal color, no pre-ulcer, no ulcer and no callus  Toe Exam: No swelling and no tenderness  Sensory   Vibration: intact  Proprioception: intact  Monofilament testing: intact    Vascular  Capillary refills: < 3 seconds  The left DP pulse is 2+  The left PT pulse is 2+       Assign Risk Category  No deformity present  No loss of protective sensation  No weak pulses  Risk: 0    Past Medical History:   Diagnosis Date   • Anxiety disorder    • Atrial fibrillation (HCC)    • Cardiac arrhythmia    • Cardiomyopathy ([facility] )    • Colitis    • Diabetes mellitus ([facility] )    • Dry eyes    • Dry mouth    • HTN (hypertension)    • Hyperlipidemia    • IBS (irritable bowel syndrome)    • MVP (mitral valve prolapse)    • Osteoporosis    • SOB (shortness of breath)    • Tachycardia      Past Surgical History:   Procedure Laterality Date   • CATARACT EXTRACTION Bilateral    • DILATION AND CURETTAGE OF UTERUS      x2   • WRIST SURGERY       Family History   Problem Relation Age of Onset   • Heart attack Mother    • Diabetes Mother    • Heart attack Father    • Stomach cancer Sister      Social History     Socioeconomic History   • Marital status: /Civil Union     Spouse name: None   • Number of children: None   • Years of education: None   • Highest education level: None   Occupational History   • Occupation: Retired   Tobacco Use   • Smoking status: Never   • Smokeless tobacco: Never   Vaping Use   • Vaping Use: Never used   Substance and Sexual Activity   • Alcohol use: No   • Drug use: Never   • Sexual activity: Not Currently   Other Topics Concern   • None   Social History Narrative    · Most recent tobacco use screenin2019      · Do you currently or have you served in [employer]:   No      · Were you activated, into active duty, as a member of the [employer] or as a Reservist:   No      · Sexual orientation:   Heterosexual      · Diet:   Diabetic       · Caffeine intake:   Occasional      · Seat belts used routinely:   Yes      · Sunscreen used routinely:   No      · Smoke alarm in home: Yes      · Advance directive:    Yes      · Salt Intake:   not much      Social Determinants of Health     Financial Resource Strain: Not on file   Food Insecurity: Not on file   Transportation Needs: Not on file   Physical Activity: Not on file   Stress: Not on file   Social Connections: Not on file   Intimate Partner Violence: Not on file   Housing Stability: Not on file     Current Outpatient Medications on File Prior to Visit   Medication Sig   • Cholecalciferol (VITAMIN D3 PO) Vitamin D3   • cyclobenzaprine (FLEXERIL) 10 mg tablet TAKE 1 TABLET BY MOUTH THREE TIMES A DAY AS NEEDED FOR MUSCLE SPASMS   • denosumab (PROLIA) 60 mg/mL Inject 60 mg under the skin every 6 (six) months   • dicyclomine (BENTYL) 10 mg capsule TAKE 1 CAPSULE (10 MG TOTAL) BY MOUTH EVERY 6 (SIX) HOURS AS NEEDED (NAUSEA)   • digoxin (LANOXIN) 0 125 mg tablet TAKE 1 TABLET BY MOUTH EVERY DAY   • Eliquis 5 MG TAKE 1 TABLET BY MOUTH TWICE A DAY   • ezetimibe-simvastatin (VYTORIN) 10-20 mg per tablet TAKE 1 TABLET BY MOUTH EVERYDAY AT BEDTIME   • Januvia 100 MG tablet TAKE 1 TABLET BY MOUTH EVERY DAY   • levocetirizine (XYZAL) 5 MG tablet TAKE 1 TABLET BY MOUTH EVERY DAY IN THE EVENING   • levothyroxine 88 mcg tablet TAKE 1 TABLET BY MOUTH EVERY DAY   • lisinopril (ZESTRIL) 2 5 mg tablet TAKE 1 TABLET BY MOUTH EVERY DAY   • metFORMIN (GLUCOPHAGE) 500 mg tablet TAKE 2 TABLETS BY MOUTH TWICE A DAY   • metoprolol tartrate (LOPRESSOR) 100 mg tablet TAKE 1 TABLET BY MOUTH EVERY 12 HOURS   • Multiple Vitamins-Minerals (CENTRUM SILVER PO) Take 1 tablet by mouth daily   • ondansetron (ZOFRAN) 8 mg tablet Take 0 5 tablets (4 mg total) by mouth every 8 (eight) hours as needed for nausea or vomiting   • oxybutynin (DITROPAN-XL) 10 MG 24 hr tablet TAKE 2 TABLETS (20 MG TOTAL) BY MOUTH DAILY AT BEDTIME   • pantoprazole (PROTONIX) 40 mg tablet TAKE 1 TABLET BY MOUTH TWICE A DAY   • senna (SENOKOT) 8 6 MG tablet Take 1 tablet by mouth daily   • spironolactone (ALDACTONE) 25 mg tablet TAKE 1 TABLET BY MOUTH EVERY DAY     Allergies   Allergen Reactions   • Butoconazole    • Moxifloxacin    • Neomycin-Bacitracin Zn-Polymyx    • Smz-Tmp Ds [Sulfamethoxazole-Trimethoprim]    • Sulfa Antibiotics    • Telithromycin Diarrhea     Immunization History   Administered Date(s) Administered   • COVID-19 MODERNA VACC 0 25 ML IM BOOSTER 12/15/2021   • COVID-19 MODERNA VACC 0 5 ML IM 04/07/2021, 05/05/2021       Objective     /68 (BP Location: Left arm, Patient Position: Sitting, Cuff Size: Standard)   Pulse (!) 142   Temp 98 4 °F (36 9 °C)   Resp 18   Ht 5' 4" (1 626 m)   Wt 57 6 kg (127 lb)   SpO2 98%   BMI 21 80 kg/m²     Physical Exam  Vitals and nursing note reviewed  Constitutional:       Appearance: She is well-developed and well-nourished  HENT:      Head: Normocephalic and atraumatic  Nose: Nose normal    Eyes:      General: No scleral icterus  Extraocular Movements: EOM normal       Conjunctiva/sclera: Conjunctivae normal       Pupils: Pupils are equal, round, and reactive to light  Neck:      Thyroid: No thyromegaly  Cardiovascular:      Rate and Rhythm: Normal rate and regular rhythm  Pulses: no weak pulses          Dorsalis pedis pulses are 2+ on the right side and 2+ on the left side  Posterior tibial pulses are 2+ on the right side and 2+ on the left side  Pulmonary:      Effort: Pulmonary effort is normal       Breath sounds: Normal breath sounds  No wheezing  Abdominal:      General: Bowel sounds are normal  There is no distension  Palpations: Abdomen is soft  Tenderness: There is no abdominal tenderness  There is no guarding or rebound  Musculoskeletal:         General: No tenderness, deformity or edema  Normal range of motion  Cervical back: Normal range of motion and neck supple  Feet:      Right foot:      Skin integrity: No ulcer, skin breakdown, erythema, warmth, callus or dry skin  Left foot:      Skin integrity: No ulcer, skin breakdown, erythema, warmth, callus or dry skin  Skin:     General: Skin is warm and dry  Findings: No erythema or rash     Neurological:      Mental Status: She is alert and oriented to person, place, and time  Sensory: No sensory deficit  Psychiatric:         Mood and Affect: Mood and affect normal          Behavior: Behavior normal          Thought Content:  Thought content normal          Judgment: Judgment normal        Maryellen Rojas MD

## 2022-11-21 ENCOUNTER — OFFICE VISIT (OUTPATIENT)
Dept: FAMILY MEDICINE CLINIC | Facility: CLINIC | Age: 77
End: 2022-11-21

## 2022-11-21 VITALS
DIASTOLIC BLOOD PRESSURE: 68 MMHG | RESPIRATION RATE: 18 BRPM | TEMPERATURE: 98.4 F | HEIGHT: 64 IN | OXYGEN SATURATION: 98 % | BODY MASS INDEX: 21.68 KG/M2 | HEART RATE: 142 BPM | WEIGHT: 127 LBS | SYSTOLIC BLOOD PRESSURE: 120 MMHG

## 2022-11-21 DIAGNOSIS — F41.9 ANXIETY: ICD-10-CM

## 2022-11-21 DIAGNOSIS — E78.2 MIXED HYPERLIPIDEMIA: ICD-10-CM

## 2022-11-21 DIAGNOSIS — Z78.0 MENOPAUSE: ICD-10-CM

## 2022-11-21 DIAGNOSIS — E03.4 HYPOTHYROIDISM DUE TO ACQUIRED ATROPHY OF THYROID: ICD-10-CM

## 2022-11-21 DIAGNOSIS — I42.9 CARDIOMYOPATHY, UNSPECIFIED TYPE (HCC): ICD-10-CM

## 2022-11-21 DIAGNOSIS — M81.0 AGE-RELATED OSTEOPOROSIS WITHOUT CURRENT PATHOLOGICAL FRACTURE: ICD-10-CM

## 2022-11-21 DIAGNOSIS — E11.51 TYPE 2 DIABETES MELLITUS WITH DIABETIC PERIPHERAL ANGIOPATHY WITHOUT GANGRENE, WITHOUT LONG-TERM CURRENT USE OF INSULIN (HCC): ICD-10-CM

## 2022-11-21 DIAGNOSIS — Z00.00 WELL ADULT EXAM: Primary | ICD-10-CM

## 2022-11-21 DIAGNOSIS — Z23 ENCOUNTER FOR IMMUNIZATION: ICD-10-CM

## 2022-11-21 DIAGNOSIS — N18.31 STAGE 3A CHRONIC KIDNEY DISEASE (HCC): ICD-10-CM

## 2022-11-21 DIAGNOSIS — I48.91 ATRIAL FIBRILLATION, UNSPECIFIED TYPE (HCC): ICD-10-CM

## 2022-11-21 DIAGNOSIS — I10 PRIMARY HYPERTENSION: ICD-10-CM

## 2022-11-21 DIAGNOSIS — Z12.31 SCREENING MAMMOGRAM FOR HIGH-RISK PATIENT: ICD-10-CM

## 2022-11-21 NOTE — ASSESSMENT & PLAN NOTE
Patient to continue with weight bearing exercises as much as possible and oral intake of 1200 mg of calcium with 800 IU of Vit D to maximize bone protection  Pt  to continue  with DEXA scan every 2 years to reassess  All qustions regarding this problem answered today to patient satisfaction

## 2022-11-21 NOTE — PATIENT INSTRUCTIONS
Medicare Preventive Visit Patient Instructions  Thank you for completing your Welcome to Medicare Visit or Medicare Annual Wellness Visit today  Your next wellness visit will be due in one year (11/22/2023)  The screening/preventive services that you may require over the next 5-10 years are detailed below  Some tests may not apply to you based off risk factors and/or age  Screening tests ordered at today's visit but not completed yet may show as past due  Also, please note that scanned in results may not display below  Preventive Screenings:  Service Recommendations Previous Testing/Comments   Colorectal Cancer Screening  * Colonoscopy    * Fecal Occult Blood Test (FOBT)/Fecal Immunochemical Test (FIT)  * Fecal DNA/Cologuard Test  * Flexible Sigmoidoscopy Age: 39-70 years old   Colonoscopy: every 10 years (may be performed more frequently if at higher risk)  OR  FOBT/FIT: every 1 year  OR  Cologuard: every 3 years  OR  Sigmoidoscopy: every 5 years  Screening may be recommended earlier than age 39 if at higher risk for colorectal cancer  Also, an individualized decision between you and your healthcare provider will decide whether screening between the ages of 74-80 would be appropriate  Colonoscopy: Not on file  FOBT/FIT: Not on file  Cologuard: Not on file  Sigmoidoscopy: Not on file          Breast Cancer Screening Age: 36 years old  Frequency: every 1-2 years  Not required if history of left and right mastectomy Mammogram: 12/27/2018        Cervical Cancer Screening Between the ages of 21-29, pap smear recommended once every 3 years  Between the ages of 33-67, can perform pap smear with HPV co-testing every 5 years     Recommendations may differ for women with a history of total hysterectomy, cervical cancer, or abnormal pap smears in past  Pap Smear: Not on file    Screening Not Indicated   Hepatitis C Screening Once for adults born between [facility]  More frequently in patients at high risk for Hepatitis C Hep C Antibody: 01/14/2022    Screening Current   Diabetes Screening 1-2 times per year if you're at risk for diabetes or have pre-diabetes Fasting glucose: 141 mg/dL (1/14/2022)  A1C: 9 3 % (1/14/2022)  Screening Not Indicated  History Diabetes   Cholesterol Screening Once every 5 years if you don't have a lipid disorder  May order more often based on risk factors  Lipid panel: 01/14/2022    Screening Not Indicated  History Lipid Disorder     Other Preventive Screenings Covered by Medicare:  1  Abdominal Aortic Aneurysm (AAA) Screening: covered once if your at risk  You're considered to be at risk if you have a family history of AAA  2  Lung Cancer Screening: covers low dose CT scan once per year if you meet all of the following conditions: (1) Age 50-69; (2) No signs or symptoms of lung cancer; (3) Current smoker or have quit smoking within the last 15 years; (4) You have a tobacco smoking history of at least 20 pack years (packs per day multiplied by number of years you smoked); (5) You get a written order from a healthcare provider  3  Glaucoma Screening: covered annually if you're considered high risk: (1) You have diabetes OR (2) Family history of glaucoma OR (3)  aged 48 and older OR (3)  American aged 72 and older  3  Osteoporosis Screening: covered every 2 years if you meet one of the following conditions: (1) You're estrogen deficient and at risk for osteoporosis based off medical history and other findings; (2) Have a vertebral abnormality; (3) On glucocorticoid therapy for more than 3 months; (4) Have primary hyperparathyroidism; (5) On osteoporosis medications and need to assess response to drug therapy  · Last bone density test (DXA Scan): Not on file  5  HIV Screening: covered annually if you're between the age of 12-76  Also covered annually if you are younger than 13 and older than 72 with risk factors for HIV infection   For pregnant patients, it is covered up to 3 times per pregnancy  Immunizations:  Immunization Recommendations   Influenza Vaccine Annual influenza vaccination during flu season is recommended for all persons aged >= 6 months who do not have contraindications   Pneumococcal Vaccine   * Pneumococcal conjugate vaccine = PCV13 (Prevnar 13), PCV15 (Vaxneuvance), PCV20 (Prevnar 20)  * Pneumococcal polysaccharide vaccine = PPSV23 (Pneumovax) Adults 25-60 years old: 1-3 doses may be recommended based on certain risk factors  Adults 72 years old: 1-2 doses may be recommended based off what pneumonia vaccine you previously received   Hepatitis B Vaccine 3 dose series if at intermediate or high risk (ex: diabetes, end stage renal disease, liver disease)   Tetanus (Td) Vaccine - COST NOT COVERED BY MEDICARE PART B Following completion of primary series, a booster dose should be given every 10 years to maintain immunity against tetanus  Td may also be given as tetanus wound prophylaxis  Tdap Vaccine - COST NOT COVERED BY MEDICARE PART B Recommended at least once for all adults  For pregnant patients, recommended with each pregnancy  Shingles Vaccine (Shingrix) - COST NOT COVERED BY MEDICARE PART B  2 shot series recommended in those aged 48 and above     Health Maintenance Due:      Topic Date Due   • Breast Cancer Screening: Mammogram  12/27/2019   • Hepatitis C Screening  Completed     Immunizations Due:      Topic Date Due   • Hepatitis B Vaccine (1 of 3 - 3-dose series) Never done   • Pneumococcal Vaccine: 65+ Years (1 - PCV) Never done   • COVID-19 Vaccine (4 - Booster for Moderna series) 04/15/2022   • Influenza Vaccine (1) Never done     Advance Directives   What are advance directives? Advance directives are legal documents that state your wishes and plans for medical care  These plans are made ahead of time in case you lose your ability to make decisions for yourself   Advance directives can apply to any medical decision, such as the treatments you want, and if you want to donate organs  What are the types of advance directives? There are many types of advance directives, and each state has rules about how to use them  You may choose a combination of any of the following:  · Living will: This is a written record of the treatment you want  You can also choose which treatments you do not want, which to limit, and which to stop at a certain time  This includes surgery, medicine, IV fluid, and tube feedings  · Durable power of  for healthcare [facility] [place]): This is a written record that states who you want to make healthcare choices for you when you are unable to make them for yourself  This person, called a proxy, is usually a family member or a friend  You may choose more than 1 proxy  · Do not resuscitate (DNR) order:  A DNR order is used in case your heart stops beating or you stop breathing  It is a request not to have certain forms of treatment, such as CPR  A DNR order may be included in other types of advance directives  · Medical directive: This covers the care that you want if you are in a coma, near death, or unable to make decisions for yourself  You can list the treatments you want for each condition  Treatment may include pain medicine, surgery, blood transfusions, dialysis, IV or tube feedings, and a ventilator (breathing machine)  · Values history: This document has questions about your views, beliefs, and how you feel and think about life  This information can help others choose the care that you would choose  Why are advance directives important? An advance directive helps you control your care  Although spoken wishes may be used, it is better to have your wishes written down  Spoken wishes can be misunderstood, or not followed  Treatments may be given even if you do not want them  An advance directive may make it easier for your family to make difficult choices about your care     Fall Prevention    Fall prevention  includes ways to make your home and other areas safer  It also includes ways you can move more carefully to prevent a fall  Health conditions that cause changes in your blood pressure, vision, or muscle strength and coordination may increase your risk for falls  Medicines may also increase your risk for falls if they make you dizzy, weak, or sleepy  Fall prevention tips:   · Stand or sit up slowly  · Use assistive devices as directed  · Wear shoes that fit well and have soles that   · Wear a personal alarm  · Stay active  · Manage your medical conditions  Home Safety Tips:  · Add items to prevent falls in the bathroom  · Keep paths clear  · Install bright lights in your home  · Keep items you use often on shelves within reach  · Paint or place reflective tape on the edges of your stairs  © Copyright [employer] 2018 Information is for End User's use only and may not be sold, redistributed or otherwise used for commercial purposes   All illustrations and images included in CareNotes® are the copyrighted property of [employer]  or [address] [place] [address] [employer]

## 2022-11-21 NOTE — PROGRESS NOTES
Assessment and Plan:     Problem List Items Addressed This Visit        Endocrine    Type 2 diabetes mellitus with diabetic peripheral angiopathy without gangrene ([facility] )     Needs A1C  Lab Results   Component Value Date    HGBA1C 9 3 (H) 01/14/2022            Relevant Orders    Hemoglobin A1C    Microalbumin,Urine    Hypothyroidism     Patient to continue current dose of thyroid medicine and recheck TSH in 6 months            Cardiovascular and Mediastinum    Atrial fibrillation ([facility] )     Continue with anticogulation and rate control of heart rate  Concerns about condition were addressed today  Cardiomyopathy ([place] [address] )    Hypertension     Patient is stable with current anti-hypertensive medicine and continue to follow a low sodium diet and take current medication  All questions about this condition were answered today  Relevant Orders    TSH + Free T4    Comprehensive metabolic panel    CBC and differential    Magnesium    Uric acid    Urinalysis with microscopic       Musculoskeletal and Integument    Age-related osteoporosis without current pathological fracture     Patient to continue with weight bearing exercises as much as possible and oral intake of 1200 mg of calcium with 800 IU of Vit D to maximize bone protection  Pt  to continue  with DEXA scan every 2 years to reassess  All qustions regarding this problem answered today to patient satisfaction  Genitourinary    Stage 3a chronic kidney disease ([place] [address] )       Other    Hyperlipidemia     Patient  is stable with current medication and we discussed a low fat low cholesterol diet  Weight loss also discussed for this will help lower cholesterol also  Recheck lipids in 6 months  Relevant Orders    Lipid Panel with Direct LDL reflex    Anxiety     Patient to continue utilizing medical therapy as well as counseling sources as applicable to condition  If suicidal thought or fear of suicide attempt, to call 911 and seek help immediately  Medications and therapy reviewed today and all patient  questions answered today  Other Visit Diagnoses     Well adult exam    -  Primary    Screening mammogram for high-risk patient        Relevant Orders    Mammo screening bilateral w 3d & cad    Encounter for immunization        Menopause        Relevant Orders    DXA bone density spine hip and pelvis          Depression Screening and Follow-up Plan: Patient was screened for depression during today's encounter  They screened negative with a PHQ-2 score of 2  Preventive health issues were discussed with patient, and age appropriate screening tests were ordered as noted in patient's After Visit Summary  Personalized health advice and appropriate referrals for health education or preventive services given if needed, as noted in patient's After Visit Summary       History of Present Illness:     Patient presents for a Medicare Wellness Visit    HPI   Patient Care Team:  Vanesa London MD as PCP - General (Family Medicine)  Dawit Cano MD     Review of Systems:     Review of Systems     Problem List:     Patient Active Problem List   Diagnosis   • Atrial fibrillation ([place] [address] )   • Cardiomyopathy [facility])   • Hypertension   • Hyperlipidemia   • Chest pain   • Pelvic pain   • Ankle joint effusion   • Anxiety   • Arthritis   • Type 2 diabetes mellitus with diabetic peripheral angiopathy without gangrene (HCC)   • Irritable bowel syndrome   • Mitral valvular disorder   • Neuropathy   • Peripheral vascular disease (HCC)   • Reflex sympathetic dystrophy of other specified site   • Hypothyroidism   • Age-related osteoporosis without current pathological fracture   • Stage 3a chronic kidney disease ([place] [address] )      Past Medical and Surgical History:     Past Medical History:   Diagnosis Date   • Anxiety disorder    • Atrial fibrillation [facility])    • Cardiac arrhythmia    • Cardiomyopathy ([place] [address] )    • Colitis    • Diabetes mellitus ([place] [address] )    • Dry eyes    • Dry mouth    • HTN (hypertension)    • Hyperlipidemia    • IBS (irritable bowel syndrome)    • MVP (mitral valve prolapse)    • Osteoporosis    • SOB (shortness of breath)    • Tachycardia      Past Surgical History:   Procedure Laterality Date   • CATARACT EXTRACTION Bilateral    • DILATION AND CURETTAGE OF UTERUS      x2   • WRIST SURGERY        Family History:     Family History   Problem Relation Age of Onset   • Heart attack Mother    • Diabetes Mother    • Heart attack Father    • Stomach cancer Sister       Social History:     Social History     Socioeconomic History   • Marital status: /Civil Union     Spouse name: None   • Number of children: None   • Years of education: None   • Highest education level: None   Occupational History   • Occupation: Retired   Tobacco Use   • Smoking status: Never   • Smokeless tobacco: Never   Vaping Use   • Vaping Use: Never used   Substance and Sexual Activity   • Alcohol use: No   • Drug use: Never   • Sexual activity: Not Currently   Other Topics Concern   • None   Social History Narrative    · Most recent tobacco use screenin2019      · Do you currently or have you served in the [employer]:   No      · Were you activated, into active duty, as a member of the [employer] or as a Reservist:   No      · Sexual orientation:   Heterosexual      · Diet:   Diabetic       · Caffeine intake:   Occasional      · Seat belts used routinely:   Yes      · Sunscreen used routinely:   No      · Smoke alarm in home: Yes      · Advance directive:    Yes      · Salt Intake:   not much      Social Determinants of Health     Financial Resource Strain: Not on file   Food Insecurity: Not on file   Transportation Needs: Not on file   Physical Activity: Not on file   Stress: Not on file   Social Connections: Not on file   Intimate Partner Violence: Not on file   Housing Stability: Not on file      Medications and Allergies:     Current Outpatient Medications   Medication Sig Dispense Refill   • Cholecalciferol (VITAMIN D3 PO) Vitamin D3     • cyclobenzaprine (FLEXERIL) 10 mg tablet TAKE 1 TABLET BY MOUTH THREE TIMES A DAY AS NEEDED FOR MUSCLE SPASMS 270 tablet 1   • denosumab (PROLIA) 60 mg/mL Inject 60 mg under the skin every 6 (six) months     • dicyclomine (BENTYL) 10 mg capsule TAKE 1 CAPSULE (10 MG TOTAL) BY MOUTH EVERY 6 (SIX) HOURS AS NEEDED (NAUSEA) 360 capsule 1   • digoxin (LANOXIN) 0 125 mg tablet TAKE 1 TABLET BY MOUTH EVERY DAY 90 tablet 3   • Eliquis 5 MG TAKE 1 TABLET BY MOUTH TWICE A  tablet 3   • ezetimibe-simvastatin (VYTORIN) 10-20 mg per tablet TAKE 1 TABLET BY MOUTH EVERYDAY AT BEDTIME 90 tablet 1   • Januvia 100 MG tablet TAKE 1 TABLET BY MOUTH EVERY DAY 90 tablet 1   • levocetirizine (XYZAL) 5 MG tablet TAKE 1 TABLET BY MOUTH EVERY DAY IN THE EVENING 90 tablet 1   • levothyroxine 88 mcg tablet TAKE 1 TABLET BY MOUTH EVERY DAY 90 tablet 0   • lisinopril (ZESTRIL) 2 5 mg tablet TAKE 1 TABLET BY MOUTH EVERY DAY 90 tablet 2   • metFORMIN (GLUCOPHAGE) 500 mg tablet TAKE 2 TABLETS BY MOUTH TWICE A  tablet 1   • metoprolol tartrate (LOPRESSOR) 100 mg tablet TAKE 1 TABLET BY MOUTH EVERY 12 HOURS 180 tablet 3   • Multiple Vitamins-Minerals (CENTRUM SILVER PO) Take 1 tablet by mouth daily     • ondansetron (ZOFRAN) 8 mg tablet Take 0 5 tablets (4 mg total) by mouth every 8 (eight) hours as needed for nausea or vomiting 270 tablet 1   • oxybutynin (DITROPAN-XL) 10 MG 24 hr tablet TAKE 2 TABLETS (20 MG TOTAL) BY MOUTH DAILY AT BEDTIME 180 tablet 0   • pantoprazole (PROTONIX) 40 mg tablet TAKE 1 TABLET BY MOUTH TWICE A  tablet 3   • senna (SENOKOT) 8 6 MG tablet Take 1 tablet by mouth daily     • spironolactone (ALDACTONE) 25 mg tablet TAKE 1 TABLET BY MOUTH EVERY DAY 90 tablet 1     No current facility-administered medications for this visit       Allergies   Allergen Reactions   • Butoconazole    • Moxifloxacin    • Neomycin-Bacitracin Zn-Polymyx    • Smz-Tmp Ds [Sulfamethoxazole-Trimethoprim]    • Sulfa Antibiotics    • Telithromycin Diarrhea      Immunizations:     Immunization History   Administered Date(s) Administered   • COVID-19 MODERNA VACC 0 25 ML IM BOOSTER 12/15/2021   • COVID-19 MODERNA VACC 0 5 ML IM 04/07/2021, 05/05/2021      Health Maintenance:         Topic Date Due   • Breast Cancer Screening: Mammogram  12/27/2019   • Hepatitis C Screening  Completed         Topic Date Due   • Hepatitis B Vaccine (1 of 3 - 3-dose series) Never done   • Pneumococcal Vaccine: 65+ Years (1 - PCV) Never done   • COVID-19 Vaccine (4 - Booster for Moderna series) 04/15/2022   • Influenza Vaccine (1) Never done      Medicare Screening Tests and Risk Assessments: Fransisca Belcher is here for her Subsequent Wellness visit  Last Medicare Wellness visit information reviewed, patient interviewed and updates made to the record today  Health Risk Assessment:   Patient rates overall health as fair  Patient feels that their physical health rating is slightly worse  Patient is satisfied with their life  Eyesight was rated as slightly worse  Hearing was rated as same  Patient feels that their emotional and mental health rating is slightly worse  Patients states they are never, rarely angry  Patient states they are never, rarely unusually tired/fatigued  Pain experienced in the last 7 days has been some  Patient's pain rating has been 2/10  Patient states that she has experienced no weight loss or gain in last 6 months  Depression Screening:   PHQ-2 Score: 2      Fall Risk Screening: In the past year, patient has experienced: history of falling in past year    Number of falls: 2 or more  Injured during fall?: Yes    Feels unsteady when standing or walking?: No    Worried about falling?: No      Home Safety:  Patient has trouble with stairs inside or outside of their home  Patient has working smoke alarms and has no working carbon monoxide detector  Home safety hazards include: none  Nutrition:   Current diet is Regular  Medications:   Patient is not currently taking any over-the-counter supplements  Patient is not able to manage medications  Activities of Daily Living (ADLs)/Instrumental Activities of Daily Living (IADLs):   Walk and transfer into and out of bed and chair?: Yes  Dress and groom yourself?: Yes    Bathe or shower yourself?: Yes    Feed yourself? Yes  Do your laundry/housekeeping?: Yes  Manage your money, pay your bills and track your expenses?: Yes  Make your own meals?: Yes    Do your own shopping?: Yes    Previous Hospitalizations:   Any hospitalizations or ED visits within the last 12 months?: No      Advance Care Planning:   Living will: Yes    Advanced directive: Yes      Cognitive Screening:   Provider or family/friend/caregiver concerned regarding cognition?: No    PREVENTIVE SCREENINGS      Cardiovascular Screening:    General: Screening Not Indicated and History Lipid Disorder      Diabetes Screening:     General: Screening Not Indicated and History Diabetes      Cervical Cancer Screening:    General: Screening Not Indicated      Osteoporosis Screening:    General: Screening Not Indicated and History Osteoporosis      Lung Cancer Screening:     General: Screening Not Indicated      Hepatitis C Screening:    General: Screening Current    Screening, Brief Intervention, and Referral to Treatment (SBIRT)    Screening  Typical number of drinks in a day: 0  Typical number of drinks in a week: 0  Interpretation: Low risk drinking behavior  No results found       Physical Exam:     /68 (BP Location: Left arm, Patient Position: Sitting, Cuff Size: Standard)   Pulse (!) 142   Temp 98 4 °F (36 9 °C)   Resp 18   Ht 5' 4" (1 626 m)   Wt 57 6 kg (127 lb)   SpO2 98%   BMI 21 80 kg/m²     Physical Exam     Myrna Hoover MD

## 2022-12-22 ENCOUNTER — RA CDI HCC (OUTPATIENT)
Dept: OTHER | Facility: HOSPITAL | Age: 77
End: 2022-12-22

## 2023-01-15 DIAGNOSIS — J30.1 ALLERGIC RHINITIS DUE TO POLLEN, UNSPECIFIED SEASONALITY: ICD-10-CM

## 2023-01-15 DIAGNOSIS — E11.9 TYPE 2 DIABETES MELLITUS WITHOUT COMPLICATION, WITHOUT LONG-TERM CURRENT USE OF INSULIN (HCC): ICD-10-CM

## 2023-01-15 DIAGNOSIS — E78.2 MIXED HYPERLIPIDEMIA: ICD-10-CM

## 2023-01-15 DIAGNOSIS — L65.9 ALOPECIA: ICD-10-CM

## 2023-01-15 DIAGNOSIS — R11.2 NAUSEA AND VOMITING: ICD-10-CM

## 2023-01-15 RX ORDER — DICYCLOMINE HYDROCHLORIDE 10 MG/1
10 CAPSULE ORAL EVERY 6 HOURS PRN
Qty: 360 CAPSULE | Refills: 1 | Status: SHIPPED | OUTPATIENT
Start: 2023-01-15

## 2023-01-15 RX ORDER — LEVOCETIRIZINE DIHYDROCHLORIDE 5 MG/1
TABLET, FILM COATED ORAL
Qty: 90 TABLET | Refills: 1 | Status: SHIPPED | OUTPATIENT
Start: 2023-01-15

## 2023-01-16 RX ORDER — SPIRONOLACTONE 25 MG/1
TABLET ORAL
Qty: 90 TABLET | Refills: 1 | Status: SHIPPED | OUTPATIENT
Start: 2023-01-16

## 2023-01-16 RX ORDER — EZETIMIBE AND SIMVASTATIN 10; 20 MG/1; MG/1
TABLET ORAL
Qty: 90 TABLET | Refills: 1 | Status: SHIPPED | OUTPATIENT
Start: 2023-01-16

## 2023-01-30 ENCOUNTER — TELEPHONE (OUTPATIENT)
Dept: FAMILY MEDICINE CLINIC | Facility: CLINIC | Age: 78
End: 2023-01-30

## 2023-01-30 NOTE — TELEPHONE ENCOUNTER
Patient called asking if she could take an over the counter medication called prezagin she wanted to check with you to see if this is safe as she is on multiple other medications

## 2023-03-31 DIAGNOSIS — R11.2 NAUSEA AND VOMITING: ICD-10-CM

## 2023-03-31 RX ORDER — ONDANSETRON HYDROCHLORIDE 8 MG/1
4 TABLET, FILM COATED ORAL EVERY 8 HOURS PRN
Qty: 18 TABLET | Refills: 29 | Status: SHIPPED | OUTPATIENT
Start: 2023-03-31

## 2023-05-09 DIAGNOSIS — N32.81 OAB (OVERACTIVE BLADDER): ICD-10-CM

## 2023-05-09 RX ORDER — OXYBUTYNIN CHLORIDE 10 MG/1
20 TABLET, EXTENDED RELEASE ORAL
Qty: 180 TABLET | Refills: 0 | Status: SHIPPED | OUTPATIENT
Start: 2023-05-09

## 2023-05-25 ENCOUNTER — APPOINTMENT (OUTPATIENT)
Dept: LAB | Facility: CLINIC | Age: 78
End: 2023-05-25

## 2023-05-25 DIAGNOSIS — I10 PRIMARY HYPERTENSION: ICD-10-CM

## 2023-05-25 DIAGNOSIS — E78.2 MIXED HYPERLIPIDEMIA: ICD-10-CM

## 2023-05-25 DIAGNOSIS — E11.51 TYPE 2 DIABETES MELLITUS WITH DIABETIC PERIPHERAL ANGIOPATHY WITHOUT GANGRENE, WITHOUT LONG-TERM CURRENT USE OF INSULIN (HCC): ICD-10-CM

## 2023-05-25 LAB
ALBUMIN SERPL BCP-MCNC: 4.2 G/DL (ref 3.5–5)
ALP SERPL-CCNC: 127 U/L (ref 46–116)
ALT SERPL W P-5'-P-CCNC: 16 U/L (ref 12–78)
ANION GAP SERPL CALCULATED.3IONS-SCNC: 6 MMOL/L (ref 4–13)
AST SERPL W P-5'-P-CCNC: 21 U/L (ref 5–45)
BACTERIA UR QL AUTO: ABNORMAL /HPF
BASOPHILS # BLD AUTO: 0.06 THOUSANDS/ÂΜL (ref 0–0.1)
BASOPHILS NFR BLD AUTO: 1 % (ref 0–1)
BILIRUB SERPL-MCNC: 0.79 MG/DL (ref 0.2–1)
BILIRUB UR QL STRIP: NEGATIVE
BUN SERPL-MCNC: 24 MG/DL (ref 5–25)
CALCIUM SERPL-MCNC: 9.8 MG/DL (ref 8.3–10.1)
CHLORIDE SERPL-SCNC: 101 MMOL/L (ref 96–108)
CHOLEST SERPL-MCNC: 146 MG/DL
CLARITY UR: ABNORMAL
CO2 SERPL-SCNC: 24 MMOL/L (ref 21–32)
COLOR UR: YELLOW
CREAT SERPL-MCNC: 1.49 MG/DL (ref 0.6–1.3)
CREAT UR-MCNC: 296 MG/DL
EOSINOPHIL # BLD AUTO: 0.15 THOUSAND/ÂΜL (ref 0–0.61)
EOSINOPHIL NFR BLD AUTO: 1 % (ref 0–6)
ERYTHROCYTE [DISTWIDTH] IN BLOOD BY AUTOMATED COUNT: 11.9 % (ref 11.6–15.1)
GFR SERPL CREATININE-BSD FRML MDRD: 33 ML/MIN/1.73SQ M
GLUCOSE P FAST SERPL-MCNC: 232 MG/DL (ref 65–99)
GLUCOSE UR STRIP-MCNC: NEGATIVE MG/DL
HCT VFR BLD AUTO: 40.4 % (ref 34.8–46.1)
HDLC SERPL-MCNC: 47 MG/DL
HGB BLD-MCNC: 14.2 G/DL (ref 11.5–15.4)
HGB UR QL STRIP.AUTO: ABNORMAL
HYALINE CASTS #/AREA URNS LPF: ABNORMAL /LPF
IMM GRANULOCYTES # BLD AUTO: 0.03 THOUSAND/UL (ref 0–0.2)
IMM GRANULOCYTES NFR BLD AUTO: 0 % (ref 0–2)
KETONES UR STRIP-MCNC: ABNORMAL MG/DL
LDLC SERPL CALC-MCNC: 61 MG/DL (ref 0–100)
LEUKOCYTE ESTERASE UR QL STRIP: ABNORMAL
LYMPHOCYTES # BLD AUTO: 2.71 THOUSANDS/ÂΜL (ref 0.6–4.47)
LYMPHOCYTES NFR BLD AUTO: 26 % (ref 14–44)
MAGNESIUM SERPL-MCNC: 1.3 MG/DL (ref 1.6–2.6)
MCH RBC QN AUTO: 32.8 PG (ref 26.8–34.3)
MCHC RBC AUTO-ENTMCNC: 35.1 G/DL (ref 31.4–37.4)
MCV RBC AUTO: 93 FL (ref 82–98)
MICROALBUMIN UR-MCNC: 75.5 MG/L (ref 0–20)
MICROALBUMIN/CREAT 24H UR: 26 MG/G CREATININE (ref 0–30)
MONOCYTES # BLD AUTO: 1.07 THOUSAND/ÂΜL (ref 0.17–1.22)
MONOCYTES NFR BLD AUTO: 10 % (ref 4–12)
MUCOUS THREADS UR QL AUTO: ABNORMAL
NEUTROPHILS # BLD AUTO: 6.56 THOUSANDS/ÂΜL (ref 1.85–7.62)
NEUTS SEG NFR BLD AUTO: 62 % (ref 43–75)
NITRITE UR QL STRIP: NEGATIVE
NON-SQ EPI CELLS URNS QL MICRO: ABNORMAL /HPF
NRBC BLD AUTO-RTO: 0 /100 WBCS
PH UR STRIP.AUTO: 5.5 [PH]
PLATELET # BLD AUTO: 239 THOUSANDS/UL (ref 149–390)
PMV BLD AUTO: 10.6 FL (ref 8.9–12.7)
POTASSIUM SERPL-SCNC: 4.3 MMOL/L (ref 3.5–5.3)
PROT SERPL-MCNC: 8.2 G/DL (ref 6.4–8.4)
PROT UR STRIP-MCNC: ABNORMAL MG/DL
RBC # BLD AUTO: 4.33 MILLION/UL (ref 3.81–5.12)
RBC #/AREA URNS AUTO: ABNORMAL /HPF
SODIUM SERPL-SCNC: 131 MMOL/L (ref 135–147)
SP GR UR STRIP.AUTO: 1.02 (ref 1–1.03)
TRIGL SERPL-MCNC: 192 MG/DL
URATE SERPL-MCNC: 5.7 MG/DL (ref 2–7.5)
UROBILINOGEN UR STRIP-ACNC: 2 MG/DL
WBC # BLD AUTO: 10.58 THOUSAND/UL (ref 4.31–10.16)
WBC #/AREA URNS AUTO: ABNORMAL /HPF
WBC CLUMPS # UR AUTO: PRESENT /UL

## 2023-05-26 LAB
EST. AVERAGE GLUCOSE BLD GHB EST-MCNC: 220 MG/DL
HBA1C MFR BLD: 9.3 %

## 2023-06-07 ENCOUNTER — RA CDI HCC (OUTPATIENT)
Dept: OTHER | Facility: HOSPITAL | Age: 78
End: 2023-06-07

## 2023-06-07 NOTE — PROGRESS NOTES
E11 22   [facility] 75  coding opportunities          Chart Reviewed number of suggestions sent to Provider: 1     Patients Insurance     Medicare Insurance: [employer]

## 2023-06-16 ENCOUNTER — OFFICE VISIT (OUTPATIENT)
Dept: FAMILY MEDICINE CLINIC | Facility: CLINIC | Age: 78
End: 2023-06-16
Payer: MEDICARE

## 2023-06-16 VITALS
HEIGHT: 64 IN | TEMPERATURE: 97.8 F | BODY MASS INDEX: 20.32 KG/M2 | HEART RATE: 105 BPM | RESPIRATION RATE: 18 BRPM | SYSTOLIC BLOOD PRESSURE: 120 MMHG | DIASTOLIC BLOOD PRESSURE: 66 MMHG | WEIGHT: 119 LBS | OXYGEN SATURATION: 99 %

## 2023-06-16 DIAGNOSIS — I48.91 ATRIAL FIBRILLATION, UNSPECIFIED TYPE (HCC): Primary | ICD-10-CM

## 2023-06-16 DIAGNOSIS — K58.9 IRRITABLE BOWEL SYNDROME WITHOUT DIARRHEA: ICD-10-CM

## 2023-06-16 DIAGNOSIS — E03.4 HYPOTHYROIDISM DUE TO ACQUIRED ATROPHY OF THYROID: ICD-10-CM

## 2023-06-16 DIAGNOSIS — I42.9 CARDIOMYOPATHY, UNSPECIFIED TYPE (HCC): ICD-10-CM

## 2023-06-16 DIAGNOSIS — I10 PRIMARY HYPERTENSION: ICD-10-CM

## 2023-06-16 DIAGNOSIS — E11.51 TYPE 2 DIABETES MELLITUS WITH DIABETIC PERIPHERAL ANGIOPATHY WITHOUT GANGRENE, WITHOUT LONG-TERM CURRENT USE OF INSULIN (HCC): ICD-10-CM

## 2023-06-16 DIAGNOSIS — E78.2 MIXED HYPERLIPIDEMIA: ICD-10-CM

## 2023-06-16 DIAGNOSIS — E46 PROTEIN-CALORIE MALNUTRITION, UNSPECIFIED SEVERITY (HCC): ICD-10-CM

## 2023-06-16 DIAGNOSIS — N18.31 STAGE 3A CHRONIC KIDNEY DISEASE (HCC): ICD-10-CM

## 2023-06-16 PROCEDURE — 99214 OFFICE O/P EST MOD 30 MIN: CPT | Performed by: FAMILY MEDICINE

## 2023-06-16 NOTE — PROGRESS NOTES
Name: Stone Watson      : 1945      MRN: [id number]  Encounter Provider: Amber Montana MD  Encounter Date: 2023   Encounter department: [address] [place] [address] Place     1  Atrial fibrillation, unspecified type [facility])  Assessment & Plan:  Continue with anticogulation and rate control of heart rate  Concerns about condition were addressed today  2  Cardiomyopathy, unspecified type [facility])  Assessment & Plan:  Patient is stable  and will continue present plan of care and reassess at next routine visit  All questions about this problem from patient were answered today  3  Primary hypertension  Assessment & Plan:  Patient is stable with current anti-hypertensive medicine and continue to follow a low sodium diet and take current medication  All questions about this condition were answered today  4  Mixed hyperlipidemia  Assessment & Plan:  Patient  is stable with current medication and we discussed a low fat low cholesterol diet  Weight loss also discussed for this will help lower cholesterol also  Recheck lipids in 6 months  Orders:  -     Lipid Panel with Direct LDL reflex; Future    5  Type 2 diabetes mellitus with diabetic peripheral angiopathy without gangrene, without long-term current use of insulin (HCC)  Assessment & Plan:  Needs better control  Lab Results   Component Value Date    HGBA1C 9 3 (H) 2023       Orders:  -     Hemoglobin A1C; Future; Expected date: 06/15/2023  -     Comprehensive metabolic panel; Future; Expected date: 06/15/2023    6  Irritable bowel syndrome without diarrhea  Assessment & Plan:  Patient is stable  and will continue present plan of care and reassess at next routine visit  All questions about this problem from patient were answered today        7  Hypothyroidism due to acquired atrophy of thyroid  Assessment & Plan:  Patient to continue current dose of thyroid medicine and recheck TSH in 6 months    Orders:  - TSH, 3rd generation with Free T4 reflex; Future    8  Protein-calorie malnutrition, unspecified severity ([facility] )    9  Stage 3a chronic kidney disease ([facility] )        Falls Plan of Care: balance, strength, and gait training instructions were provided  Home safety education provided  Urinary Incontinence Plan of Care: counseling topics discussed: practice Kegel (pelvic floor strengthening) exercises, use restroom every 2 hours, limit alcohol, caffeine, spicy foods, and acidic foods, limiting fluid intake 3-4 hours before bed, weight loss, preventing constipation, improving blood sugar control and limiting fluid intake to 60 oz  per day  Subjective     Is a 69-year-old female here today for checkup on multiple medical past patient with type 2 diabetes hypertension hypothyroidism symptoms anxiety some depression osteoporosis hyperlipidemia atrial fibrillation and cardiomyopathy  Patient had lab work reviewed her lab work looks pretty good but her sugars been high and her A1c was 9 3  Patient states she has had a very stressful year because her  passed away last year and she is currently getting back on her diet and is going to be using her medicines on a regular basis to get her sugar down and we will check it again in 3 months  Her blood pressure is doing well today and if she needs any refills we will refill the medicines for her as she needs today  Review of Systems   Constitutional: Negative for activity change, appetite change, fatigue and fever  HENT: Negative for congestion, ear pain, postnasal drip, rhinorrhea, sinus pressure, sinus pain, sneezing and sore throat  Eyes: Negative for pain and redness  Respiratory: Negative for apnea, cough, chest tightness, shortness of breath and wheezing  Cardiovascular: Negative for chest pain, palpitations and leg swelling  Gastrointestinal: Negative for abdominal pain, constipation, diarrhea, nausea and vomiting     Endocrine: Negative for cold intolerance and heat intolerance  Genitourinary: Negative for difficulty urinating, dysuria, frequency, hematuria and urgency  Musculoskeletal: Negative for arthralgias, back pain, gait problem and myalgias  Skin: Negative for rash  Neurological: Negative for dizziness, speech difficulty, weakness, numbness and headaches  Hematological: Does not bruise/bleed easily  Psychiatric/Behavioral: Negative for agitation, confusion and hallucinations         Past Medical History:   Diagnosis Date   • Anxiety disorder    • Atrial fibrillation (HCC)    • Cardiac arrhythmia    • Cardiomyopathy ([place] [address] )    • Colitis    • Diabetes mellitus ([place] [address] )    • Dry eyes    • Dry mouth    • HTN (hypertension)    • Hyperlipidemia    • IBS (irritable bowel syndrome)    • MVP (mitral valve prolapse)    • Osteoporosis    • SOB (shortness of breath)    • Tachycardia      Past Surgical History:   Procedure Laterality Date   • CATARACT EXTRACTION Bilateral    • DILATION AND CURETTAGE OF UTERUS      x2   • WRIST SURGERY       Family History   Problem Relation Age of Onset   • Heart attack Mother    • Diabetes Mother    • Heart attack Father    • Stomach cancer Sister      Social History     Socioeconomic History   • Marital status: /Civil Union     Spouse name: None   • Number of children: None   • Years of education: None   • Highest education level: None   Occupational History   • Occupation: Retired   Tobacco Use   • Smoking status: Never   • Smokeless tobacco: Never   Vaping Use   • Vaping Use: Never used   Substance and Sexual Activity   • Alcohol use: No   • Drug use: Never   • Sexual activity: Not Currently   Other Topics Concern   • None   Social History Narrative    · Most recent tobacco use screenin2019      · Do you currently or have you served in the [facility] [employer]:   No      · Were you activated, into active duty, as a member of the [employer] or as a Reservist:   No      · Sexual orientation: Heterosexual      · Diet:   Diabetic       · Caffeine intake:   Occasional      · Seat belts used routinely:   Yes      · Sunscreen used routinely:   No      · Smoke alarm in home: Yes      · Advance directive:    Yes      · Salt Intake:   not much      Social Determinants of Health     Financial Resource Strain: Not on file   Food Insecurity: Not on file   Transportation Needs: Not on file   Physical Activity: Not on file   Stress: Not on file   Social Connections: Not on file   Intimate Partner Violence: Not on file   Housing Stability: Not on file     Current Outpatient Medications on File Prior to Visit   Medication Sig   • Cholecalciferol (VITAMIN D3 PO) Vitamin D3   • cyclobenzaprine (FLEXERIL) 10 mg tablet TAKE 1 TABLET BY MOUTH THREE TIMES A DAY AS NEEDED FOR MUSCLE SPASMS   • denosumab (PROLIA) 60 mg/mL Inject 60 mg under the skin every 6 (six) months   • dicyclomine (BENTYL) 10 mg capsule TAKE 1 CAPSULE (10 MG TOTAL) BY MOUTH EVERY 6 (SIX) HOURS AS NEEDED (NAUSEA)   • digoxin (LANOXIN) 0 125 mg tablet TAKE 1 TABLET BY MOUTH EVERY DAY   • Eliquis 5 MG TAKE 1 TABLET BY MOUTH TWICE A DAY   • ezetimibe-simvastatin (VYTORIN) 10-20 mg per tablet TAKE 1 TABLET BY MOUTH EVERYDAY AT BEDTIME   • Januvia 100 MG tablet TAKE 1 TABLET BY MOUTH EVERY DAY   • levocetirizine (XYZAL) 5 MG tablet TAKE 1 TABLET BY MOUTH EVERY DAY IN THE EVENING   • levothyroxine 88 mcg tablet TAKE 1 TABLET BY MOUTH EVERY DAY   • lisinopril (ZESTRIL) 2 5 mg tablet TAKE 1 TABLET BY MOUTH EVERY DAY   • metFORMIN (GLUCOPHAGE) 500 mg tablet TAKE 2 TABLETS BY MOUTH TWICE A DAY   • Multiple Vitamins-Minerals (CENTRUM SILVER PO) Take 1 tablet by mouth daily   • ondansetron (ZOFRAN) 8 mg tablet TAKE 0 5 TABLETS (4 MG TOTAL) BY MOUTH EVERY 8 (EIGHT) HOURS AS NEEDED FOR NAUSEA OR VOMITING   • oxybutynin (DITROPAN-XL) 10 MG 24 hr tablet TAKE 2 TABLETS (20 MG TOTAL) BY MOUTH DAILY AT BEDTIME   • pantoprazole (PROTONIX) 40 mg tablet TAKE 1 TABLET BY MOUTH "TWICE A DAY   • senna (SENOKOT) 8 6 MG tablet Take 1 tablet by mouth daily   • spironolactone (ALDACTONE) 25 mg tablet TAKE 1 TABLET BY MOUTH EVERY DAY   • metoprolol tartrate (LOPRESSOR) 100 mg tablet TAKE 1 TABLET BY MOUTH EVERY 12 HOURS (Patient not taking: Reported on 6/16/2023)     Allergies   Allergen Reactions   • Butoconazole    • Moxifloxacin    • Neomycin-Bacitracin Zn-Polymyx    • Smz-Tmp Ds [Sulfamethoxazole-Trimethoprim]    • Sulfa Antibiotics    • Telithromycin Diarrhea     Immunization History   Administered Date(s) Administered   • COVID-19 MODERNA VACC 0 25 ML IM BOOSTER 12/15/2021   • COVID-19 MODERNA VACC 0 5 ML IM 04/07/2021, 05/05/2021, 12/15/2021       Objective     /66 (BP Location: Left arm, Patient Position: Sitting, Cuff Size: Standard)   Pulse 105   Temp 97 8 °F (36 6 °C) (Temporal)   Resp 18   Ht 5' 4\" (1 626 m)   Wt 54 kg (119 lb)   SpO2 99%   BMI 20 43 kg/m²     Physical Exam  Vitals and nursing note reviewed  Constitutional:       Appearance: She is well-developed  HENT:      Head: Normocephalic and atraumatic  Nose: Nose normal       Mouth/Throat:      Mouth: Mucous membranes are moist    Eyes:      General: No scleral icterus  Conjunctiva/sclera: Conjunctivae normal       Pupils: Pupils are equal, round, and reactive to light  Neck:      Thyroid: No thyromegaly  Cardiovascular:      Rate and Rhythm: Normal rate  Rhythm irregular  Pulmonary:      Effort: Pulmonary effort is normal       Breath sounds: Normal breath sounds  No wheezing  Abdominal:      General: Bowel sounds are normal  There is no distension  Palpations: Abdomen is soft  Tenderness: There is no abdominal tenderness  There is no guarding or rebound  Musculoskeletal:         General: No tenderness or deformity  Normal range of motion  Cervical back: Normal range of motion and neck supple  Skin:     General: Skin is warm and dry  Findings: No erythema or rash   " Neurological:      Mental Status: She is alert and oriented to person, place, and time  Sensory: No sensory deficit  Psychiatric:         Mood and Affect: Mood normal          Behavior: Behavior normal          Thought Content:  Thought content normal          Judgment: Judgment normal        Tate Bautista MD

## 2023-06-16 NOTE — ASSESSMENT & PLAN NOTE
Continue with anticogulation and rate control of heart rate  Concerns about condition were addressed today  Complex Repair And Flap Additional Text (Will Appearing After The Standard Complex Repair Text): The complex repair was not sufficient to completely close the primary defect. The remaining additional defect was repaired with the flap mentioned below.

## 2023-06-21 ENCOUNTER — HOSPITAL ENCOUNTER (OUTPATIENT)
Age: 78
Discharge: HOME | End: 2023-06-21
Payer: MEDICARE

## 2023-06-21 DIAGNOSIS — Z12.31 SCREENING MAMMOGRAM FOR HIGH-RISK PATIENT: ICD-10-CM

## 2023-06-21 DIAGNOSIS — Z78.0 MENOPAUSE: ICD-10-CM

## 2023-06-21 PROCEDURE — 77080 DXA BONE DENSITY AXIAL: CPT

## 2023-06-21 PROCEDURE — 77063 BREAST TOMOSYNTHESIS BI: CPT

## 2023-06-21 PROCEDURE — 77067 SCR MAMMO BI INCL CAD: CPT

## 2023-06-23 DIAGNOSIS — R11.2 NAUSEA AND VOMITING: ICD-10-CM

## 2023-06-23 RX ORDER — DICYCLOMINE HYDROCHLORIDE 10 MG/1
10 CAPSULE ORAL EVERY 6 HOURS PRN
Qty: 360 CAPSULE | Refills: 1 | Status: SHIPPED | OUTPATIENT
Start: 2023-06-23

## 2023-07-26 ENCOUNTER — OFFICE VISIT (OUTPATIENT)
Dept: URGENT CARE | Facility: CLINIC | Age: 78
End: 2023-07-26
Payer: MEDICARE

## 2023-07-26 ENCOUNTER — HOSPITAL ENCOUNTER (EMERGENCY)
Facility: HOSPITAL | Age: 78
Discharge: HOME | End: 2023-07-27
Payer: MEDICARE

## 2023-07-26 ENCOUNTER — APPOINTMENT (EMERGENCY)
Dept: RADIOLOGY | Facility: HOSPITAL | Age: 78
End: 2023-07-26
Payer: MEDICARE

## 2023-07-26 ENCOUNTER — APPOINTMENT (EMERGENCY)
Dept: CT IMAGING | Facility: HOSPITAL | Age: 78
End: 2023-07-26
Payer: MEDICARE

## 2023-07-26 VITALS
DIASTOLIC BLOOD PRESSURE: 86 MMHG | RESPIRATION RATE: 18 BRPM | SYSTOLIC BLOOD PRESSURE: 141 MMHG | OXYGEN SATURATION: 99 % | HEART RATE: 108 BPM | TEMPERATURE: 97.4 F

## 2023-07-26 DIAGNOSIS — N39.0 URINARY TRACT INFECTION: ICD-10-CM

## 2023-07-26 DIAGNOSIS — I48.91 ATRIAL FIBRILLATION WITH RAPID VENTRICULAR RESPONSE (HCC): ICD-10-CM

## 2023-07-26 DIAGNOSIS — K59.00 CONSTIPATION: ICD-10-CM

## 2023-07-26 DIAGNOSIS — E83.42 HYPOMAGNESEMIA: ICD-10-CM

## 2023-07-26 DIAGNOSIS — R31.9 URINARY TRACT INFECTION WITH HEMATURIA, SITE UNSPECIFIED: ICD-10-CM

## 2023-07-26 DIAGNOSIS — E87.1 HYPONATREMIA: ICD-10-CM

## 2023-07-26 DIAGNOSIS — N39.0 URINARY TRACT INFECTION WITH HEMATURIA, SITE UNSPECIFIED: ICD-10-CM

## 2023-07-26 DIAGNOSIS — R30.0 DYSURIA: Primary | ICD-10-CM

## 2023-07-26 DIAGNOSIS — I48.91 ATRIAL FIBRILLATION WITH RAPID VENTRICULAR RESPONSE (HCC): Primary | ICD-10-CM

## 2023-07-26 DIAGNOSIS — R55 SYNCOPE, UNSPECIFIED SYNCOPE TYPE: ICD-10-CM

## 2023-07-26 DIAGNOSIS — M79.642 LEFT HAND PAIN: ICD-10-CM

## 2023-07-26 DIAGNOSIS — R10.32 LLQ ABDOMINAL PAIN: ICD-10-CM

## 2023-07-26 LAB
2HR DELTA HS TROPONIN: 0 NG/L
ALBUMIN SERPL BCP-MCNC: 4.6 G/DL (ref 3.5–5)
ALP SERPL-CCNC: 126 U/L (ref 34–104)
ALT SERPL W P-5'-P-CCNC: 10 U/L (ref 7–52)
ANION GAP SERPL CALCULATED.3IONS-SCNC: 10 MMOL/L
AST SERPL W P-5'-P-CCNC: 16 U/L (ref 13–39)
BACTERIA UR QL AUTO: ABNORMAL /HPF
BASOPHILS # BLD AUTO: 0.05 THOUSANDS/ÂΜL (ref 0–0.1)
BASOPHILS NFR BLD AUTO: 1 % (ref 0–1)
BILIRUB SERPL-MCNC: 0.72 MG/DL (ref 0.2–1)
BILIRUB UR QL STRIP: NEGATIVE
BNP SERPL-MCNC: 43 PG/ML (ref 0–100)
BUN SERPL-MCNC: 26 MG/DL (ref 5–25)
CALCIUM SERPL-MCNC: 10.4 MG/DL (ref 8.4–10.2)
CARDIAC TROPONIN I PNL SERPL HS: 6 NG/L
CARDIAC TROPONIN I PNL SERPL HS: 6 NG/L
CHLORIDE SERPL-SCNC: 95 MMOL/L (ref 96–108)
CLARITY UR: ABNORMAL
CO2 SERPL-SCNC: 26 MMOL/L (ref 21–32)
COLOR UR: ABNORMAL
CREAT SERPL-MCNC: 1.42 MG/DL (ref 0.6–1.3)
DIGOXIN SERPL-MCNC: 1.5 NG/ML (ref 0.8–2)
EOSINOPHIL # BLD AUTO: 0.06 THOUSAND/ÂΜL (ref 0–0.61)
EOSINOPHIL NFR BLD AUTO: 1 % (ref 0–6)
ERYTHROCYTE [DISTWIDTH] IN BLOOD BY AUTOMATED COUNT: 11.8 % (ref 11.6–15.1)
GFR SERPL CREATININE-BSD FRML MDRD: 35 ML/MIN/1.73SQ M
GLUCOSE SERPL-MCNC: 132 MG/DL (ref 65–140)
GLUCOSE UR STRIP-MCNC: NEGATIVE MG/DL
HCT VFR BLD AUTO: 38.4 % (ref 34.8–46.1)
HGB BLD-MCNC: 13 G/DL (ref 11.5–15.4)
HGB UR QL STRIP.AUTO: NEGATIVE
IMM GRANULOCYTES # BLD AUTO: 0.04 THOUSAND/UL (ref 0–0.2)
IMM GRANULOCYTES NFR BLD AUTO: 0 % (ref 0–2)
INR PPP: 1.4 (ref 0.84–1.19)
KETONES UR STRIP-MCNC: ABNORMAL MG/DL
LACTATE SERPL-SCNC: 0.9 MMOL/L (ref 0.5–2)
LEUKOCYTE ESTERASE UR QL STRIP: ABNORMAL
LYMPHOCYTES # BLD AUTO: 1.72 THOUSANDS/ÂΜL (ref 0.6–4.47)
LYMPHOCYTES NFR BLD AUTO: 18 % (ref 14–44)
MAGNESIUM SERPL-MCNC: 1.6 MG/DL (ref 1.9–2.7)
MAGNESIUM SERPL-MCNC: 2.7 MG/DL (ref 1.9–2.7)
MCH RBC QN AUTO: 31.6 PG (ref 26.8–34.3)
MCHC RBC AUTO-ENTMCNC: 33.9 G/DL (ref 31.4–37.4)
MCV RBC AUTO: 93 FL (ref 82–98)
MONOCYTES # BLD AUTO: 0.75 THOUSAND/ÂΜL (ref 0.17–1.22)
MONOCYTES NFR BLD AUTO: 8 % (ref 4–12)
NEUTROPHILS # BLD AUTO: 6.71 THOUSANDS/ÂΜL (ref 1.85–7.62)
NEUTS SEG NFR BLD AUTO: 72 % (ref 43–75)
NITRITE UR QL STRIP: POSITIVE
NON-SQ EPI CELLS URNS QL MICRO: ABNORMAL /HPF
NRBC BLD AUTO-RTO: 0 /100 WBCS
PH UR STRIP.AUTO: 5.5 [PH]
PLATELET # BLD AUTO: 297 THOUSANDS/UL (ref 149–390)
PMV BLD AUTO: 8.8 FL (ref 8.9–12.7)
POTASSIUM SERPL-SCNC: 4.8 MMOL/L (ref 3.5–5.3)
PROT SERPL-MCNC: 8.6 G/DL (ref 6.4–8.4)
PROT UR STRIP-MCNC: NEGATIVE MG/DL
PROTHROMBIN TIME: 16.9 SECONDS (ref 11.6–14.5)
RBC # BLD AUTO: 4.11 MILLION/UL (ref 3.81–5.12)
RBC #/AREA URNS AUTO: ABNORMAL /HPF
SL AMB  POCT GLUCOSE, UA: NEGATIVE
SL AMB LEUKOCYTE ESTERASE,UA: ABNORMAL
SL AMB POCT BILIRUBIN,UA: NEGATIVE
SL AMB POCT BLOOD,UA: ABNORMAL
SL AMB POCT CLARITY,UA: ABNORMAL
SL AMB POCT COLOR,UA: ABNORMAL
SL AMB POCT KETONES,UA: NEGATIVE
SL AMB POCT NITRITE,UA: POSITIVE
SL AMB POCT PH,UA: 5
SL AMB POCT SPECIFIC GRAVITY,UA: 1.01
SL AMB POCT URINE PROTEIN: NEGATIVE
SL AMB POCT UROBILINOGEN: 0.2
SODIUM SERPL-SCNC: 131 MMOL/L (ref 135–147)
SP GR UR STRIP.AUTO: 1.01 (ref 1–1.03)
TSH SERPL DL<=0.05 MIU/L-ACNC: 0.3 UIU/ML (ref 0.45–4.5)
UROBILINOGEN UR STRIP-ACNC: <2 MG/DL
WBC # BLD AUTO: 9.33 THOUSAND/UL (ref 4.31–10.16)
WBC #/AREA URNS AUTO: ABNORMAL /HPF

## 2023-07-26 PROCEDURE — 81001 URINALYSIS AUTO W/SCOPE: CPT | Performed by: PHYSICIAN ASSISTANT

## 2023-07-26 PROCEDURE — 96365 THER/PROPH/DIAG IV INF INIT: CPT

## 2023-07-26 PROCEDURE — 84484 ASSAY OF TROPONIN QUANT: CPT | Performed by: PHYSICIAN ASSISTANT

## 2023-07-26 PROCEDURE — 93005 ELECTROCARDIOGRAM TRACING: CPT

## 2023-07-26 PROCEDURE — 99213 OFFICE O/P EST LOW 20 MIN: CPT

## 2023-07-26 PROCEDURE — 96376 TX/PRO/DX INJ SAME DRUG ADON: CPT

## 2023-07-26 PROCEDURE — 83605 ASSAY OF LACTIC ACID: CPT | Performed by: PHYSICIAN ASSISTANT

## 2023-07-26 PROCEDURE — 87086 URINE CULTURE/COLONY COUNT: CPT

## 2023-07-26 PROCEDURE — 36415 COLL VENOUS BLD VENIPUNCTURE: CPT | Performed by: PHYSICIAN ASSISTANT

## 2023-07-26 PROCEDURE — 81002 URINALYSIS NONAUTO W/O SCOPE: CPT

## 2023-07-26 PROCEDURE — 70450 CT HEAD/BRAIN W/O DYE: CPT

## 2023-07-26 PROCEDURE — 83880 ASSAY OF NATRIURETIC PEPTIDE: CPT | Performed by: PHYSICIAN ASSISTANT

## 2023-07-26 PROCEDURE — G0463 HOSPITAL OUTPT CLINIC VISIT: HCPCS

## 2023-07-26 PROCEDURE — 71045 X-RAY EXAM CHEST 1 VIEW: CPT

## 2023-07-26 PROCEDURE — 85025 COMPLETE CBC W/AUTO DIFF WBC: CPT | Performed by: PHYSICIAN ASSISTANT

## 2023-07-26 PROCEDURE — 87077 CULTURE AEROBIC IDENTIFY: CPT

## 2023-07-26 PROCEDURE — 96375 TX/PRO/DX INJ NEW DRUG ADDON: CPT

## 2023-07-26 PROCEDURE — 96361 HYDRATE IV INFUSION ADD-ON: CPT

## 2023-07-26 PROCEDURE — 99285 EMERGENCY DEPT VISIT HI MDM: CPT

## 2023-07-26 PROCEDURE — 80053 COMPREHEN METABOLIC PANEL: CPT | Performed by: PHYSICIAN ASSISTANT

## 2023-07-26 PROCEDURE — 80162 ASSAY OF DIGOXIN TOTAL: CPT | Performed by: PHYSICIAN ASSISTANT

## 2023-07-26 PROCEDURE — 87086 URINE CULTURE/COLONY COUNT: CPT | Performed by: PHYSICIAN ASSISTANT

## 2023-07-26 PROCEDURE — 73130 X-RAY EXAM OF HAND: CPT

## 2023-07-26 PROCEDURE — 83735 ASSAY OF MAGNESIUM: CPT | Performed by: PHYSICIAN ASSISTANT

## 2023-07-26 PROCEDURE — 84443 ASSAY THYROID STIM HORMONE: CPT | Performed by: PHYSICIAN ASSISTANT

## 2023-07-26 PROCEDURE — 87186 SC STD MICRODIL/AGAR DIL: CPT

## 2023-07-26 PROCEDURE — 74176 CT ABD & PELVIS W/O CONTRAST: CPT

## 2023-07-26 PROCEDURE — G1004 CDSM NDSC: HCPCS

## 2023-07-26 PROCEDURE — 99285 EMERGENCY DEPT VISIT HI MDM: CPT | Performed by: PHYSICIAN ASSISTANT

## 2023-07-26 PROCEDURE — 87040 BLOOD CULTURE FOR BACTERIA: CPT | Performed by: PHYSICIAN ASSISTANT

## 2023-07-26 PROCEDURE — 96367 TX/PROPH/DG ADDL SEQ IV INF: CPT

## 2023-07-26 PROCEDURE — 85610 PROTHROMBIN TIME: CPT | Performed by: PHYSICIAN ASSISTANT

## 2023-07-26 RX ORDER — MAGNESIUM SULFATE HEPTAHYDRATE 40 MG/ML
2 INJECTION, SOLUTION INTRAVENOUS ONCE
Status: COMPLETED | OUTPATIENT
Start: 2023-07-26 | End: 2023-07-26

## 2023-07-26 RX ORDER — METOPROLOL TARTRATE 5 MG/5ML
5 INJECTION INTRAVENOUS ONCE
Status: COMPLETED | OUTPATIENT
Start: 2023-07-26 | End: 2023-07-26

## 2023-07-26 RX ORDER — CEFTRIAXONE 1 G/50ML
1000 INJECTION, SOLUTION INTRAVENOUS ONCE
Status: COMPLETED | OUTPATIENT
Start: 2023-07-26 | End: 2023-07-26

## 2023-07-26 RX ADMIN — MAGNESIUM SULFATE HEPTAHYDRATE 2 G: 40 INJECTION, SOLUTION INTRAVENOUS at 22:30

## 2023-07-26 RX ADMIN — METOROPROLOL TARTRATE 5 MG: 5 INJECTION, SOLUTION INTRAVENOUS at 21:05

## 2023-07-26 RX ADMIN — METOROPROLOL TARTRATE 5 MG: 5 INJECTION, SOLUTION INTRAVENOUS at 22:30

## 2023-07-26 RX ADMIN — SODIUM CHLORIDE 500 ML: 0.9 INJECTION, SOLUTION INTRAVENOUS at 21:08

## 2023-07-26 RX ADMIN — CEFTRIAXONE 1000 MG: 1 INJECTION, SOLUTION INTRAVENOUS at 21:05

## 2023-07-26 NOTE — PATIENT INSTRUCTIONS
Check my chart for urine culture results. Start antibiotic and take as directed. Take probiotic. Drink plenty of fluids, water or cranberry juice. Avoid caffeine and alcohol. Tylenol or Motrin for pain or fever. Azo for bladder spasms. Follow up with PCP in 3-5 days. If you develop fever, flank pain, vomiting, abdominal pain, or any new or concerning symptoms please return or proceed to ER. Urinary Tract Infection in Women   WHAT YOU NEED TO KNOW:   A urinary tract infection (UTI) is caused by bacteria that get inside your urinary tract. Most bacteria that enter your urinary tract come out when you urinate. If the bacteria stay in your urinary tract, you may get an infection. Your urinary tract includes your kidneys, ureters, bladder, and urethra. Urine is made in your kidneys, and it flows from the ureters to the bladder. Urine leaves the bladder through the urethra. A UTI is more common in your lower urinary tract, which includes your bladder and urethra. DISCHARGE INSTRUCTIONS:   Return to the emergency department if:   You are urinating very little or not at all. You have a high fever with shaking chills. You have side or back pain that gets worse. Contact your healthcare provider if:   You have a fever. You do not feel better after 2 days of taking antibiotics. You are vomiting. You have questions or concerns about your condition or care. Medicines:   Antibiotics  help fight a bacterial infection. Medicines  may be given to decrease pain and burning when you urinate. They will also help decrease the feeling that you need to urinate often. These medicines will make your urine orange or red. Take your medicine as directed. Contact your healthcare provider if you think your medicine is not helping or if you have side effects. Tell him or her if you are allergic to any medicine. Keep a list of the medicines, vitamins, and herbs you take.  Include the amounts, and when and why you take them. Bring the list or the pill bottles to follow-up visits. Carry your medicine list with you in case of an emergency. Follow up with your healthcare provider as directed:  Write down your questions so you remember to ask them during your visits. Prevent another UTI:   Empty your bladder often. Urinate and empty your bladder as soon as you feel the need. Do not hold your urine for long periods of time. Wipe from front to back after you urinate or have a bowel movement. This will help prevent germs from getting into your urinary tract through your urethra. Drink liquids as directed. Ask how much liquid to drink each day and which liquids are best for you. You may need to drink more liquids than usual to help flush out the bacteria. Do not drink alcohol, caffeine, or citrus juices. These can irritate your bladder and increase your symptoms. Your healthcare provider may recommend cranberry juice to help prevent a UTI. Urinate after you have sex. This can help flush out bacteria passed during sex. Do not douche or use feminine deodorants. These can change the chemical balance in your vagina. Change sanitary pads or tampons often. This will help prevent germs from getting into your urinary tract. Do pelvic muscle exercises often. Pelvic muscle exercises may help you start and stop urinating. Strong pelvic muscles may help you empty your bladder easier. Squeeze these muscles tightly for 5 seconds like you are trying to hold back urine. Then relax for 5 seconds. Gradually work up to squeezing for 10 seconds. Do 3 sets of 15 repetitions a day, or as directed. © 2017 5 [facility] [place] Information is for End User's use only and may not be sold, redistributed or otherwise used for commercial purposes. All illustrations and images included in CareNotes® are the copyrighted property of [employer] or [employer].   The above information is an  only. It is not intended as medical advice for individual conditions or treatments. Talk to your doctor, nurse or pharmacist before following any medical regimen to see if it is safe and effective for you.

## 2023-07-26 NOTE — PROGRESS NOTES
[facility]        NAME: Mickey Giraldo is a 69 y.o. female  : 1945    MRN: [id number]  DATE: 2023  TIME: 7:13 PM    Assessment and Plan   Dysuria [R30.0]  1. Dysuria  POCT urine dip    Urine culture      2. Syncope, unspecified syncope type  ECG 12 lead    Transfer to other facility      3. Atrial fibrillation with rapid ventricular response ([address])  Transfer to other facility      4. Urinary tract infection with hematuria, site unspecified  Transfer to other facility      5. LLQ abdominal pain  Transfer to other facility        EKG done showing Afib with RVR; non specific ST and T wave abnormality. Urine dip done showing large leuks, nitrites, and blood. Will send for culture. Patient Instructions     Proceed to the ER for further evaluation. Chief Complaint     Chief Complaint   Patient presents with   • Vaginal Pain     Complains of vaginal discharge brown in color. States irritation and pain to right side of groin,  used wipes with no improvement. History of Present Illness       The patient presents today with complaints of decreased appetite, chills, vaginal discharge/irritation, dysuria, LLQ abdominal pain for the past few days. She also states that about 4 days ago she "blacked out" and fell. The incident was not witnessed as she lives alone. She states she has been having palpitations more frequently recently. History of afib on eliquis, digoxin, and metoprolol. She lives alone and her son helps when he can. She did not tell her son she fell. Review of Systems   Review of Systems   Constitutional: Positive for appetite change (decreased) and chills. Negative for fever. HENT: Negative for congestion. Respiratory: Negative for cough, chest tightness and shortness of breath. Cardiovascular: Positive for palpitations. Negative for chest pain. Gastrointestinal: Positive for abdominal pain (LLQ). Negative for diarrhea, nausea and vomiting. Genitourinary: Positive for difficulty urinating and vaginal discharge (brown discharge). Musculoskeletal: Positive for arthralgias (L hand). Negative for myalgias. Skin: Negative for rash. Psychiatric/Behavioral: Negative.           Current Medications       Current Outpatient Medications:   •  Cholecalciferol (VITAMIN D3 PO), Vitamin D3, Disp: , Rfl:   •  cyclobenzaprine (FLEXERIL) 10 mg tablet, TAKE 1 TABLET BY MOUTH THREE TIMES A DAY AS NEEDED FOR MUSCLE SPASMS, Disp: 270 tablet, Rfl: 1  •  denosumab (PROLIA) 60 mg/mL, Inject 60 mg under the skin every 6 (six) months, Disp: , Rfl:   •  dicyclomine (BENTYL) 10 mg capsule, TAKE 1 CAPSULE (10 MG TOTAL) BY MOUTH EVERY 6 (SIX) HOURS AS NEEDED (NAUSEA), Disp: 360 capsule, Rfl: 1  •  digoxin (LANOXIN) 0.125 mg tablet, TAKE 1 TABLET BY MOUTH EVERY DAY, Disp: 90 tablet, Rfl: 3  •  Eliquis 5 MG, TAKE 1 TABLET BY MOUTH TWICE A DAY, Disp: 180 tablet, Rfl: 3  •  ezetimibe-simvastatin (VYTORIN) 10-20 mg per tablet, TAKE 1 TABLET BY MOUTH EVERYDAY AT BEDTIME, Disp: 90 tablet, Rfl: 1  •  Januvia 100 MG tablet, TAKE 1 TABLET BY MOUTH EVERY DAY, Disp: 90 tablet, Rfl: 1  •  levocetirizine (XYZAL) 5 MG tablet, TAKE 1 TABLET BY MOUTH EVERY DAY IN THE EVENING, Disp: 90 tablet, Rfl: 1  •  levothyroxine 88 mcg tablet, TAKE 1 TABLET BY MOUTH EVERY DAY, Disp: 30 tablet, Rfl: 0  •  lisinopril (ZESTRIL) 2.5 mg tablet, TAKE 1 TABLET BY MOUTH EVERY DAY, Disp: 90 tablet, Rfl: 2  •  metFORMIN (GLUCOPHAGE) 500 mg tablet, TAKE 2 TABLETS BY MOUTH TWICE A DAY, Disp: 360 tablet, Rfl: 1  •  Multiple Vitamins-Minerals (CENTRUM SILVER PO), Take 1 tablet by mouth daily, Disp: , Rfl:   •  ondansetron (ZOFRAN) 8 mg tablet, TAKE 0.5 TABLETS (4 MG TOTAL) BY MOUTH EVERY 8 (EIGHT) HOURS AS NEEDED FOR NAUSEA OR VOMITING, Disp: 18 tablet, Rfl: 29  •  oxybutynin (DITROPAN-XL) 10 MG 24 hr tablet, TAKE 2 TABLETS (20 MG TOTAL) BY MOUTH DAILY AT BEDTIME, Disp: 180 tablet, Rfl: 0  •  pantoprazole (PROTONIX) 40 mg tablet, TAKE 1 TABLET BY MOUTH TWICE A DAY, Disp: 180 tablet, Rfl: 3  •  senna (SENOKOT) 8.6 MG tablet, Take 1 tablet by mouth daily, Disp: , Rfl:   •  spironolactone (ALDACTONE) 25 mg tablet, TAKE 1 TABLET BY MOUTH EVERY DAY, Disp: 90 tablet, Rfl: 1  •  metoprolol tartrate (LOPRESSOR) 100 mg tablet, TAKE 1 TABLET BY MOUTH EVERY 12 HOURS (Patient not taking: Reported on 6/16/2023), Disp: 180 tablet, Rfl: 3    Current Allergies     Allergies as of 07/26/2023 - Reviewed 07/26/2023   Allergen Reaction Noted   • Butoconazole  01/28/2014   • Moxifloxacin  01/28/2014   • Neomycin-bacitracin zn-polymyx  01/28/2014   • Smz-tmp ds [sulfamethoxazole-trimethoprim]  05/03/2018   • Sulfa antibiotics  05/16/2019   • Telithromycin Diarrhea 01/28/2014            The following portions of the patient's history were reviewed and updated as appropriate: allergies, current medications, past family history, past medical history, past social history, past surgical history and problem list.     Past Medical History:   Diagnosis Date   • Anxiety disorder    • Atrial fibrillation ([address])    • Cardiac arrhythmia    • Cardiomyopathy ([address])    • Colitis    • Diabetes mellitus ([address])    • Dry eyes    • Dry mouth    • HTN (hypertension)    • Hyperlipidemia    • IBS (irritable bowel syndrome)    • MVP (mitral valve prolapse)    • Osteoporosis    • SOB (shortness of breath)    • Tachycardia        Past Surgical History:   Procedure Laterality Date   • CATARACT EXTRACTION Bilateral    • DILATION AND CURETTAGE OF UTERUS      x2   • WRIST SURGERY         Family History   Problem Relation Age of Onset   • Heart attack Mother    • Diabetes Mother    • Heart attack Father    • Colon cancer Sister         unknown age   • Stomach cancer Sister    • Stomach cancer Sister 54   • No Known Problems Sister    • No Known Problems Sister    • No Known Problems Sister    • No Known Problems Maternal Grandmother    • No Known Problems Paternal Grandmother    • No Known Problems Maternal Aunt    • No Known Problems Paternal Aunt    • No Known Problems Paternal Aunt    • Breast cancer Neg Hx          Medications have been verified. Objective   /86   Pulse (!) 108   Temp (!) 97.4 °F (36.3 °C) (Temporal)   Resp 18   SpO2 99%        Physical Exam     Physical Exam  Vitals and nursing note reviewed. Constitutional:       General: She is not in acute distress. Appearance: Normal appearance. She is not ill-appearing. HENT:      Head: Normocephalic and atraumatic. Right Ear: External ear normal.      Left Ear: External ear normal.      Nose: Nose normal.      Mouth/Throat:      Lips: Pink. Mouth: Mucous membranes are moist.   Eyes:      General: Vision grossly intact. Extraocular Movements: Extraocular movements intact. Pupils: Pupils are equal, round, and reactive to light. Cardiovascular:      Rate and Rhythm: Tachycardia present. Rhythm irregular. Heart sounds: No murmur heard. Pulmonary:      Effort: Pulmonary effort is normal. No respiratory distress. Breath sounds: Normal breath sounds. No decreased air movement. No decreased breath sounds, wheezing, rhonchi or rales. Abdominal:      General: Abdomen is flat. There is no distension. Palpations: Abdomen is soft. Tenderness: There is abdominal tenderness in the left lower quadrant. There is no right CVA tenderness or left CVA tenderness. Musculoskeletal:         General: Normal range of motion. Left hand: Tenderness present. No swelling. Normal range of motion. Normal strength. Normal sensation. Normal capillary refill. Normal pulse. Cervical back: Normal range of motion. Right lower leg: No edema. Left lower leg: No edema. Comments: Ecchymosis noted to L hand between web spaces of 4th and 5th digit with TTP. Able to make fist without pain. NVI. Skin:     General: Skin is warm. Findings: No rash.    Neurological:      Mental Status: She is alert and oriented to person, place, and time. She is confused. Motor: Motor function is intact. Gait: Gait is intact. Comments: Appears mildly confused. Difficulty focusing and goes off topic.     Psychiatric:         Attention and Perception: Attention normal.         Mood and Affect: Mood normal.

## 2023-07-27 VITALS
WEIGHT: 120 LBS | HEART RATE: 97 BPM | BODY MASS INDEX: 20.49 KG/M2 | HEIGHT: 64 IN | TEMPERATURE: 98 F | RESPIRATION RATE: 20 BRPM | SYSTOLIC BLOOD PRESSURE: 153 MMHG | DIASTOLIC BLOOD PRESSURE: 88 MMHG | OXYGEN SATURATION: 98 %

## 2023-07-27 RX ORDER — CEPHALEXIN 500 MG/1
500 CAPSULE ORAL EVERY 12 HOURS SCHEDULED
Qty: 14 CAPSULE | Refills: 0 | Status: SHIPPED | OUTPATIENT
Start: 2023-07-27 | End: 2023-08-03

## 2023-07-27 RX ORDER — POLYETHYLENE GLYCOL 3350 17 G/17G
17 POWDER, FOR SOLUTION ORAL DAILY
Qty: 255 G | Refills: 0 | Status: SHIPPED | OUTPATIENT
Start: 2023-07-27 | End: 2023-08-10

## 2023-07-27 NOTE — ED PROVIDER NOTES
History  Chief Complaint   Patient presents with   • Possible UTI     Pt reports she was seen at urgent care had urine test suggesting UTI, Pt with left sided pelvic pain. Pt also found to be in rapid a fib by urgent care, pt reports intermittent palpitations      Patient presenting to the ED from urgent care for further w/u. She was seen at the at clinic today due to dysuria, had a positive dipstick at appt with positive nitrites/large leuk esterase/trace blood. She was also found to be in rapid a-fib with HR of 137, states she did not take her meds today (usually takes Eliquis, Digoxin and Lopressor). She also states she has been having intermittent palpitations and had a near syncopal episode 4 days ago where she felt dizzy however denies striking her head or LOC. She has been having intermittent sharp LLQ abd/suprapubic pain. Denies any chest pain, SOB, nausea, vomiting, fevers or chills. On arrival she is in rapid a-fib with HR of 128, BP stable. Prior to Admission Medications   Prescriptions Last Dose Informant Patient Reported? Taking?    Cholecalciferol (VITAMIN D3 PO)  Self Yes No   Sig: Vitamin D3   Eliquis 5 MG   No No   Sig: TAKE 1 TABLET BY MOUTH TWICE A DAY   Januvia 100 MG tablet   No No   Sig: TAKE 1 TABLET BY MOUTH EVERY DAY   Multiple Vitamins-Minerals (CENTRUM SILVER PO)  Self Yes No   Sig: Take 1 tablet by mouth daily   cyclobenzaprine (FLEXERIL) 10 mg tablet   No No   Sig: TAKE 1 TABLET BY MOUTH THREE TIMES A DAY AS NEEDED FOR MUSCLE SPASMS   denosumab (PROLIA) 60 mg/mL  Self Yes No   Sig: Inject 60 mg under the skin every 6 (six) months   dicyclomine (BENTYL) 10 mg capsule   No No   Sig: TAKE 1 CAPSULE (10 MG TOTAL) BY MOUTH EVERY 6 (SIX) HOURS AS NEEDED (NAUSEA)   digoxin (LANOXIN) 0.125 mg tablet   No No   Sig: TAKE 1 TABLET BY MOUTH EVERY DAY   ezetimibe-simvastatin (VYTORIN) 10-20 mg per tablet   No No   Sig: TAKE 1 TABLET BY MOUTH EVERYDAY AT BEDTIME   levocetirizine (XYZAL) 5 MG tablet   No No   Sig: TAKE 1 TABLET BY MOUTH EVERY DAY IN THE EVENING   levothyroxine 88 mcg tablet   No No   Sig: TAKE 1 TABLET BY MOUTH EVERY DAY   lisinopril (ZESTRIL) 2.5 mg tablet   No No   Sig: TAKE 1 TABLET BY MOUTH EVERY DAY   metFORMIN (GLUCOPHAGE) 500 mg tablet   No No   Sig: TAKE 2 TABLETS BY MOUTH TWICE A DAY   metoprolol tartrate (LOPRESSOR) 100 mg tablet   No No   Sig: TAKE 1 TABLET BY MOUTH EVERY 12 HOURS   Patient not taking: Reported on 6/16/2023   ondansetron (ZOFRAN) 8 mg tablet   No No   Sig: TAKE 0.5 TABLETS (4 MG TOTAL) BY MOUTH EVERY 8 (EIGHT) HOURS AS NEEDED FOR NAUSEA OR VOMITING   oxybutynin (DITROPAN-XL) 10 MG 24 hr tablet   No No   Sig: TAKE 2 TABLETS (20 MG TOTAL) BY MOUTH DAILY AT BEDTIME   pantoprazole (PROTONIX) 40 mg tablet   No No   Sig: TAKE 1 TABLET BY MOUTH TWICE A DAY   senna (SENOKOT) 8.6 MG tablet  Self Yes No   Sig: Take 1 tablet by mouth daily   spironolactone (ALDACTONE) 25 mg tablet   No No   Sig: TAKE 1 TABLET BY MOUTH EVERY DAY      Facility-Administered Medications: None       Past Medical History:   Diagnosis Date   • Anxiety disorder    • Atrial fibrillation (HCC)    • Cardiac arrhythmia    • Cardiomyopathy (HCC)    • Colitis    • Diabetes mellitus (HCC)    • Dry eyes    • Dry mouth    • HTN (hypertension)    • Hyperlipidemia    • IBS (irritable bowel syndrome)    • MVP (mitral valve prolapse)    • Osteoporosis    • SOB (shortness of breath)    • Tachycardia        Past Surgical History:   Procedure Laterality Date   • CATARACT EXTRACTION Bilateral    • DILATION AND CURETTAGE OF UTERUS      x2   • WRIST SURGERY         Family History   Problem Relation Age of Onset   • Heart attack Mother    • Diabetes Mother    • Heart attack Father    • Colon cancer Sister         unknown age   • Stomach cancer Sister    • Stomach cancer Sister 54   • No Known Problems Sister    • No Known Problems Sister    • No Known Problems Sister    • No Known Problems Maternal Grandmother    • No Known Problems Paternal Grandmother    • No Known Problems Maternal Aunt    • No Known Problems Paternal Aunt    • No Known Problems Paternal Aunt    • Breast cancer Neg Hx      I have reviewed and agree with the history as documented. E-Cigarette/Vaping   • E-Cigarette Use Never User      E-Cigarette/Vaping Substances   • Nicotine No    • THC No    • CBD No    • Flavoring No    • Other No    • Unknown No      Social History     Tobacco Use   • Smoking status: Never   • Smokeless tobacco: Never   Vaping Use   • Vaping Use: Never used   Substance Use Topics   • Alcohol use: No   • Drug use: Never       Review of Systems   Constitutional: Negative for activity change, appetite change, chills, diaphoresis and fever. HENT: Negative. Eyes: Negative. Negative for photophobia and visual disturbance. Respiratory: Negative for cough, chest tightness and shortness of breath. Cardiovascular: Positive for palpitations. Negative for chest pain and leg swelling. Gastrointestinal: Positive for abdominal pain (LLQ). Negative for abdominal distention, blood in stool, constipation, diarrhea, nausea and vomiting. Endocrine: Negative. Genitourinary: Positive for dysuria. Negative for difficulty urinating, flank pain, hematuria, vaginal bleeding and vaginal discharge. Musculoskeletal: Negative. Skin: Negative. Allergic/Immunologic: Negative. Neurological: Negative for dizziness, syncope, speech difficulty, weakness, light-headedness, numbness and headaches. Hematological: Negative. Psychiatric/Behavioral: Negative. Physical Exam  Physical Exam  Constitutional:       General: She is not in acute distress. Appearance: Normal appearance. She is not ill-appearing, toxic-appearing or diaphoretic. HENT:      Head: Normocephalic and atraumatic. Nose: Nose normal.      Mouth/Throat:      Mouth: Mucous membranes are moist.   Eyes:      Extraocular Movements: Extraocular movements intact. Conjunctiva/sclera: Conjunctivae normal.      Pupils: Pupils are equal, round, and reactive to light. Cardiovascular:      Rate and Rhythm: Tachycardia present. Rhythm irregular. Pulmonary:      Effort: Pulmonary effort is normal. No respiratory distress. Breath sounds: Normal breath sounds. No rhonchi or rales. Abdominal:      General: Abdomen is flat. Palpations: Abdomen is soft. Tenderness: There is abdominal tenderness (mild tenderness to palpation of LLQ). There is no right CVA tenderness, left CVA tenderness, guarding or rebound. Musculoskeletal:         General: Tenderness (mild tenderness to palpation of left hand with ecchymosis noted, no snuffbox tenderness, full ROM, neurovascularly intact with palpable pulses) present. Normal range of motion. Cervical back: Normal range of motion and neck supple. Right lower leg: No edema. Left lower leg: No edema. Skin:     General: Skin is warm and dry. Capillary Refill: Capillary refill takes less than 2 seconds. Neurological:      General: No focal deficit present. Mental Status: She is alert and oriented to person, place, and time. Sensory: No sensory deficit. Motor: No weakness.    Psychiatric:         Mood and Affect: Mood normal.         Behavior: Behavior normal.         Vital Signs  ED Triage Vitals [07/26/23 2002]   Temperature Pulse Respirations Blood Pressure SpO2   98 °F (36.7 °C) (!) 128 18 152/80 99 %      Temp Source Heart Rate Source Patient Position - Orthostatic VS BP Location FiO2 (%)   Tympanic Monitor Sitting Left arm --      Pain Score       --           Vitals:    07/26/23 2100 07/26/23 2200 07/26/23 2315 07/27/23 0100   BP: 159/72 127/60 134/68 153/88   Pulse: (!) 132 (!) 108 93 97   Patient Position - Orthostatic VS: Lying Lying Lying          Visual Acuity      ED Medications  Medications   metoprolol (LOPRESSOR) injection 5 mg (5 mg Intravenous Given 7/26/23 2105)   sodium chloride 0.9 % bolus 500 mL (0 mL Intravenous Stopped 7/26/23 2208)   cefTRIAXone (ROCEPHIN) IVPB (premix in dextrose) 1,000 mg 50 mL (0 mg Intravenous Stopped 7/26/23 2135)   metoprolol (LOPRESSOR) injection 5 mg (5 mg Intravenous Given 7/26/23 2230)   magnesium sulfate 2 g/50 mL IVPB (premix) 2 g (0 g Intravenous Stopped 7/26/23 2321)       Diagnostic Studies  Results Reviewed     Procedure Component Value Units Date/Time    T3 [[id number]]     Lab Status: No result Specimen: Blood     T4, free [[id number]]     Lab Status: No result Specimen: Blood     Magnesium [[id number]]  (Normal) Collected: 07/26/23 2323    Lab Status: Final result Specimen: Blood from Arm, Right Updated: 07/26/23 2350     Magnesium 2.7 mg/dL     TSH [[id number]]  (Abnormal) Collected: 07/26/23 2023    Lab Status: Final result Specimen: Blood from Arm, Right Updated: 07/26/23 2342     TSH 3RD GENERATON 0.296 uIU/mL     HS Troponin I 2hr [[id number]]  (Normal) Collected: 07/26/23 2238    Lab Status: Final result Specimen: Blood from Arm, Right Updated: 07/26/23 2311     hs TnI 2hr 6 ng/L      Delta 2hr hsTnI 0 ng/L     Urine Microscopic [[id number]]  (Abnormal) Collected: 07/26/23 2239    Lab Status: Final result Specimen: Urine, Clean Catch Updated: 07/26/23 2300     RBC, UA 1-2 /hpf      WBC, UA Innumerable /hpf      Epithelial Cells Occasional /hpf      Bacteria, UA Occasional /hpf     Urine culture [[id number]] Collected: 07/26/23 2239    Lab Status:  In process Specimen: Urine, Clean Catch Updated: 07/26/23 2300    UA w Reflex to Microscopic w Reflex to Culture [[id number]]  (Abnormal) Collected: 07/26/23 2239    Lab Status: Final result Specimen: Urine, Clean Catch Updated: 07/26/23 2258     Color, UA Light Yellow     Clarity, UA Turbid     Specific Gravity, UA 1.014     pH, UA 5.5     Leukocytes, UA Large     Nitrite, UA Positive     Protein, UA Negative mg/dl      Glucose, UA Negative mg/dl      Ketones, UA Trace mg/dl      Urobilinogen, UA <2.0 mg/dl      Bilirubin, UA Negative     Occult Blood, UA Negative    Digoxin level [[id number]]  (Normal) Collected: 07/26/23 2023    Lab Status: Final result Specimen: Blood from Arm, Right Updated: 07/26/23 2246     Digoxin Lvl 1.5 ng/mL     HS Troponin I 4hr [[id number]]     Lab Status: No result Specimen: Blood     Comprehensive metabolic panel [[id number]]  (Abnormal) Collected: 07/26/23 2023    Lab Status: Final result Specimen: Blood from Arm, Right Updated: 07/26/23 2119     Sodium 131 mmol/L      Potassium 4.8 mmol/L      Chloride 95 mmol/L      CO2 26 mmol/L      ANION GAP 10 mmol/L      BUN 26 mg/dL      Creatinine 1.42 mg/dL      Glucose 132 mg/dL      Calcium 10.4 mg/dL      AST 16 U/L      ALT 10 U/L      Alkaline Phosphatase 126 U/L      Total Protein 8.6 g/dL      Albumin 4.6 g/dL      Total Bilirubin 0.72 mg/dL      eGFR 35 ml/min/1.73sq m     Narrative:      [place] guidelines for Chronic Kidney Disease (CKD):   •  Stage 1 with normal or high GFR (GFR > 90 mL/min/1.73 square meters)  •  Stage 2 Mild CKD (GFR = 60-89 mL/min/1.73 square meters)  •  Stage 3A Moderate CKD (GFR = 45-59 mL/min/1.73 square meters)  •  Stage 3B Moderate CKD (GFR = 30-44 mL/min/1.73 square meters)  •  Stage 4 Severe CKD (GFR = 15-29 mL/min/1.73 square meters)  •  Stage 5 End Stage CKD (GFR <15 mL/min/1.73 square meters)  Note: GFR calculation is accurate only with a steady state creatinine    Blood culture #2 [[id number]] Collected: 07/26/23 2104    Lab Status: In process Specimen: Blood from Hand, Left Updated: 07/26/23 2114    Blood culture #1 [[id number]] Collected: 07/26/23 2104    Lab Status:  In process Specimen: Blood from Arm, Right Updated: 07/26/23 2114    B-Type Natriuretic Peptide(BNP) [[id number]]  (Normal) Collected: 07/26/23 2023    Lab Status: Final result Specimen: Blood from Arm, Right Updated: 07/26/23 2107     BNP 43 pg/mL     HS Troponin 0hr (reflex protocol) [[id number]]  (Normal) Collected: 07/26/23 2023    Lab Status: Final result Specimen: Blood from Arm, Right Updated: 07/26/23 2105     hs TnI 0hr 6 ng/L     Magnesium [[id number]]  (Abnormal) Collected: 07/26/23 2023    Lab Status: Final result Specimen: Blood from Arm, Right Updated: 07/26/23 2054     Magnesium 1.6 mg/dL     Lactic acid, plasma (w/reflex if result > 2.0) [[id number]]  (Normal) Collected: 07/26/23 2023    Lab Status: Final result Specimen: Blood from Arm, Right Updated: 07/26/23 2054     LACTIC ACID 0.9 mmol/L     Narrative:      Result may be elevated if tourniquet was used during collection. Protime-INR [[id number]]  (Abnormal) Collected: 07/26/23 2023    Lab Status: Final result Specimen: Blood from Arm, Right Updated: 07/26/23 2048     Protime 16.9 seconds      INR 1.40    CBC and differential [[id number]]  (Abnormal) Collected: 07/26/23 2023    Lab Status: Final result Specimen: Blood from Arm, Right Updated: 07/26/23 2031     WBC 9.33 Thousand/uL      RBC 4.11 Million/uL      Hemoglobin 13.0 g/dL      Hematocrit 38.4 %      MCV 93 fL      MCH 31.6 pg      MCHC 33.9 g/dL      RDW 11.8 %      MPV 8.8 fL      Platelets 841 Thousands/uL      nRBC 0 /100 WBCs      Neutrophils Relative 72 %      Immat GRANS % 0 %      Lymphocytes Relative 18 %      Monocytes Relative 8 %      Eosinophils Relative 1 %      Basophils Relative 1 %      Neutrophils Absolute 6.71 Thousands/µL      Immature Grans Absolute 0.04 Thousand/uL      Lymphocytes Absolute 1.72 Thousands/µL      Monocytes Absolute 0.75 Thousand/µL      Eosinophils Absolute 0.06 Thousand/µL      Basophils Absolute 0.05 Thousands/µL                  XR hand 3+ views LEFT   ED Interpretation by Zach Villeda PA-C (07/26 2447)   Chick Davila: No acute fracture/dislocation      CT head without contrast   Final Result by Maria Guadalupe West DO (07/27 0012)      No acute intracranial abnormality.                   Workstation performed: [id number]         XR chest 1 view portable ED Interpretation by Alisha Lira PA-C (07/27 0034)   Dav Velasquez: No acute cardiopulmonary abnormality      CT abdomen pelvis wo contrast    (Results Pending)              Procedures  ECG 12 Lead Documentation Only    Date/Time: 7/26/2023 9:26 PM    Performed by: Alisha Lira PA-C  Authorized by: Alisha Lira PA-C    Indications / Diagnosis:  Tachycardia  ECG reviewed by me, the ED Provider: yes    Patient location:  ED  Previous ECG:     Previous ECG:  Compared to current    Comparison ECG info:  Rapid a fib  Interpretation:     Interpretation: abnormal    Rate:     ECG rate:  132    ECG rate assessment: tachycardic    Rhythm:     Rhythm: atrial fibrillation    Ectopy:     Ectopy: none    QRS:     QRS axis:  Normal  ST segments:     ST segments:  Non-specific    Elevation:  III, II, aVF, V5 and V6    Depression:  AVR  T waves:     T waves: non-specific               ED Course  ED Course as of 07/27/23 0241   Wed Jul 26, 2023   2150 Patient was given Lopressor 5mg IV, HR now in low 100s to one-teens, BP stable                Identification of Seniors at [facility] Most Recent Value   (ISAR) Identification of Seniors at Risk    Before the illness or injury that brought you to the Emergency, did you need someone to help you on a regular basis? 0 Filed at: 07/26/2023 2006   In the last 24 hours, have you needed more help than usual? 0 Filed at: 07/26/2023 2006   Have you been hospitalized for one or more nights during the past 6 months? 0 Filed at: 07/26/2023 2006   In general, do you see well? 0 Filed at: 07/26/2023 2006   In general, do you have serious problems with your memory? 0 Filed at: 07/26/2023 2006   Do you take more than three different medications every day? 1 Filed at: 07/26/2023 2006   ISAR Score 1 Filed at: 07/26/2023 2006                      SBIRT 22yo+    Flowsheet Row Most Recent Value   Initial Alcohol Screen: US AUDIT-C     1.  How often do you have a drink containing alcohol? 0 Filed at: 07/26/2023 2006   2. How many drinks containing alcohol do you have on a typical day you are drinking? 0 Filed at: 07/26/2023 2006   3a. Male UNDER 65: How often do you have five or more drinks on one occasion? 0 Filed at: 07/26/2023 2006   3b. FEMALE Any Age, or MALE 65+: How often do you have 4 or more drinks on one occassion? 0 Filed at: 07/26/2023 2006   Audit-C Score 0 Filed at: 07/26/2023 2006   JAIR: How many times in the past year have you. .. Used an illegal drug or used a prescription medication for non-medical reasons? Never Filed at: 07/26/2023 2006                    Medical Decision Making  Patient presenting to the ED from urgent care for further w/u. She was seen at the at clinic today due to dysuria, had a positive dipstick at appt with positive nitrites/large leuk esterase/trace blood. She was also found to be in rapid a-fib with HR of 137, states she did not take her meds today (usually takes Eliquis, Digoxin and Lopressor). She also states she has been having intermittent palpitations and had a near syncopal episode 4 days ago where she felt dizzy however denies striking her head or LOC. She has been having intermittent sharp LLQ abd/suprapubic pain. Denies any chest pain, SOB, nausea, vomiting, fevers or chills. On arrival she is in rapid a-fib with HR of 128, BP stable. She was given IV Lopressor 5mg x2 as well as 500cc NS bolus with improvement, HR now in 90s, BP stable. Trop neg x2. WBC count normal, lactate normal, hyponatremia at 131, creatinine stable at 1.42, Digoxin level normal at 1.5, TSH low at 0.29, T3/T4 pending, mag low at 1.6, IV mag sulf given with improvement in mag to 2.7. UA positive for UTI, culture pending. She was given IV Rocephin in the ED. CT head neg for acute findings. CT abd/pelvis read by virtual radiology with impression including severe constipation and possible cystitis.  Patient remains stable for dc home, will send [facility] as well as Miralax to her pharmacy. She is aware to continue her home medications, encourage fluids, strict f/u with her PCP in 1-2 days and she is aware to return to the ED if she has worsening symptoms or no BM/flatus for more than 2 days    Amount and/or Complexity of Data Reviewed  External Data Reviewed: labs and radiology. Labs: ordered. Decision-making details documented in ED Course. Radiology: ordered and independent interpretation performed. Decision-making details documented in ED Course. Details: CT abd/pelvis read by Renee  Impression:   1. The entire large bowel filled with fecal material distending the transverse colon to 4.5 cm, no colonic wall thickening to suggest acute colitis. Overall these findings are consistent with severe constipation along with possible/questionable diffuse colonic ileus. 2. Possible cystitis. ECG/medicine tests: ordered and independent interpretation performed. Decision-making details documented in ED Course. Risk  Prescription drug management. Disposition  Final diagnoses:   Atrial fibrillation with rapid ventricular response ([address])   Urinary tract infection   Hypomagnesemia   Hyponatremia   Constipation     Time reflects when diagnosis was documented in both MDM as applicable and the Disposition within this note     Time User Action Codes Description Comment    7/27/2023 12:40 AM Madelin Mendoza Add [I48.91] Atrial fibrillation with rapid ventricular response ([address])     7/27/2023 12:40 AM Madelin Mendoza Add [N39.0] Urinary tract infection     7/27/2023 12:40 AM Madelin Mendoza Add [E83.42] Hypomagnesemia     7/27/2023  2:37 AM Madelin Mendoza Add [E87.1] Hyponatremia     7/27/2023  2:37 AM Madelin Mendoza Add [K59.00] Constipation       ED Disposition     ED Disposition   Discharge    Condition   Stable    Date/Time   Thu Jul 27, 2023  2:38 AM    Comment   Marques Rodriguez discharge to home/self care.                Follow-up Information     Follow up With Specialties Details Why Contact Info Additional Information    Candy Lord MD Family Medicine In 1 day  [id number] [address] [place] Male [address] Emergency Department Emergency Medicine  If symptoms worsen [address] Emergency Department, [place], [place], [address]          Patient's Medications   Discharge Prescriptions    CEPHALEXIN (KEFLEX) 500 MG CAPSULE    Take 1 capsule (500 mg total) by mouth every 12 (twelve) hours for 7 days       Start Date: 7/27/2023 End Date: 8/3/2023       Order Dose: 500 mg       Quantity: 14 capsule    Refills: 0    POLYETHYLENE GLYCOL (MIRALAX) 17 G PACKET    Take 17 g by mouth daily for 14 days       Start Date: 7/27/2023 End Date: 8/10/2023       Order Dose: 17 g       Quantity: 255 g    Refills: 0       No discharge procedures on file.     PDMP Review     None          ED Provider  Electronically Signed by           Brayden Willams PA-C  07/27/23 1839

## 2023-07-27 NOTE — RESULT ENCOUNTER NOTE
1st attempt made to notify patient of x-ray results. Call unable to be completed, unable to leave message.

## 2023-07-28 ENCOUNTER — APPOINTMENT (OUTPATIENT)
Dept: RADIOLOGY | Facility: CLINIC | Age: 78
End: 2023-07-28
Payer: MEDICARE

## 2023-07-28 ENCOUNTER — VBI (OUTPATIENT)
Dept: FAMILY MEDICINE CLINIC | Facility: CLINIC | Age: 78
End: 2023-07-28

## 2023-07-28 ENCOUNTER — OFFICE VISIT (OUTPATIENT)
Dept: URGENT CARE | Facility: CLINIC | Age: 78
End: 2023-07-28
Payer: MEDICARE

## 2023-07-28 VITALS
OXYGEN SATURATION: 97 % | RESPIRATION RATE: 20 BRPM | DIASTOLIC BLOOD PRESSURE: 97 MMHG | SYSTOLIC BLOOD PRESSURE: 139 MMHG | HEART RATE: 139 BPM | TEMPERATURE: 99.6 F

## 2023-07-28 DIAGNOSIS — R07.81 RIB PAIN ON LEFT SIDE: ICD-10-CM

## 2023-07-28 DIAGNOSIS — R07.81 RIB PAIN ON LEFT SIDE: Primary | ICD-10-CM

## 2023-07-28 DIAGNOSIS — F42.3 HOARDING BEHAVIOR: ICD-10-CM

## 2023-07-28 LAB
ATRIAL RATE: 131 BPM
ATRIAL RATE: 137 BPM
BACTERIA UR CULT: ABNORMAL
BACTERIA UR CULT: ABNORMAL
QRS AXIS: 76 DEGREES
QRS AXIS: 79 DEGREES
QRSD INTERVAL: 82 MS
QRSD INTERVAL: 82 MS
QT INTERVAL: 290 MS
QT INTERVAL: 292 MS
QTC INTERVAL: 432 MS
QTC INTERVAL: 437 MS
T WAVE AXIS: -7 DEGREES
T WAVE AXIS: 228 DEGREES
VENTRICULAR RATE: 132 BPM
VENTRICULAR RATE: 137 BPM

## 2023-07-28 PROCEDURE — 71101 X-RAY EXAM UNILAT RIBS/CHEST: CPT

## 2023-07-28 PROCEDURE — 99213 OFFICE O/P EST LOW 20 MIN: CPT

## 2023-07-28 PROCEDURE — 93010 ELECTROCARDIOGRAM REPORT: CPT | Performed by: INTERNAL MEDICINE

## 2023-07-28 PROCEDURE — G0463 HOSPITAL OUTPT CLINIC VISIT: HCPCS

## 2023-07-28 NOTE — PROGRESS NOTES
[facility]    NAME: Alysa Corrales is a 69 y.o. female  : 1945    MRN: [id number]  DATE: 2023  TIME: 3:43 PM    Assessment and Plan   Rib pain on left side [R07.81]  1. Rib pain on left side  XR ribs left w pa chest min 3 views      2. Hoarding behavior          XR obtained showing no acute fracture. Will follow up with final report from radiologist.   For pain take Tylenol/NSAIDS, or can try icyhot/pain patches. Encouraged to take deep breaths. Gait normal in office today with cane. Multiple living concerns - extensive discussion over 30 minutes regarding safety -- using pill organizers, working to convince her to stay at sons house in the meantime which she refuses (fully oriented to refuse), to use her walker. Referred to PCP for further discussion -- recommended case management, counseling? Etc. Pt is very hesitant to accept any help and strongly reinforces that she wants no one in the house. Pt fully oriented and allowed to return home despite continued recommendation to seek help with son. Son overall very frustrated. HR at end of visit 100-109 irregular- - no SOB, CP, dizziness at this time. Follow up with PCP 3-5 days. Go to the ER with chest pain, dizziness, a fall, shortness of breath, worsening pain. Patient Instructions     For pain: take Tylenol/NSAIDs (ibuprofen, Advil, Aleve). Can apply Icyhot or lidocaine/pain patches onto the pain area - you can get these at the pharmacy or most supermarkets. *Call your family practice Phone [phone]. *Take all of the antibiotic medication. Go to the ER with chest pain, dizziness, a fall, shortness of breath, worsening pain. Chief Complaint     Chief Complaint   Patient presents with   • Back Pain   • Chest Pain     Pt back pain, s/p fall. Son is concerned about pt safety. State shge s         History of Present Illness       Presents with posterior rib pain to the left side that started today after a fall. Pt reports she is not totally clear on the situation following the fall, but attributes to loss of footing/mobility verus dizziness. She does have a fib, seen in the ER yesterday for symptoms. At that point had near syncope, falls and -140 and did not take her medications that day. Today reports no dizziness, chest pain, palpation symptoms. She reports pain to the rib worse with deep breath, cough. Son present with mother. He is concerned for her overall safety -- she lives in a hoarder household without clear walkways, rat infestation, she cares for a dog (blind and with medical issues). House is a single level with 3 bedroom and has 2 steps to a deck which has a railing. She lives alone following husbands passing 1.5 years ago. Also reports increasing dizziness, mobility with gait veering to the side over the past few months. Review of Systems   Review of Systems   Constitutional: Negative for chills, fatigue and fever. HENT: Negative for congestion and sore throat. Respiratory: Positive for chest tightness. Negative for cough, shortness of breath and wheezing. Pain with deep breath   Cardiovascular: Positive for chest pain. Gastrointestinal: Negative for abdominal pain. Genitourinary: Negative for dysuria. Musculoskeletal: Positive for back pain. Negative for myalgias. Neurological: Negative for dizziness. Psychiatric/Behavioral: Negative for confusion.          Current Medications       Current Outpatient Medications:   •  cephalexin (KEFLEX) 500 mg capsule, Take 1 capsule (500 mg total) by mouth every 12 (twelve) hours for 7 days, Disp: 14 capsule, Rfl: 0  •  Cholecalciferol (VITAMIN D3 PO), Vitamin D3, Disp: , Rfl:   •  cyclobenzaprine (FLEXERIL) 10 mg tablet, TAKE 1 TABLET BY MOUTH THREE TIMES A DAY AS NEEDED FOR MUSCLE SPASMS, Disp: 270 tablet, Rfl: 1  •  denosumab (PROLIA) 60 mg/mL, Inject 60 mg under the skin every 6 (six) months, Disp: , Rfl:   •  dicyclomine (BENTYL) 10 mg capsule, TAKE 1 CAPSULE (10 MG TOTAL) BY MOUTH EVERY 6 (SIX) HOURS AS NEEDED (NAUSEA), Disp: 360 capsule, Rfl: 1  •  digoxin (LANOXIN) 0.125 mg tablet, TAKE 1 TABLET BY MOUTH EVERY DAY, Disp: 90 tablet, Rfl: 3  •  Eliquis 5 MG, TAKE 1 TABLET BY MOUTH TWICE A DAY, Disp: 180 tablet, Rfl: 3  •  ezetimibe-simvastatin (VYTORIN) 10-20 mg per tablet, TAKE 1 TABLET BY MOUTH EVERYDAY AT BEDTIME, Disp: 90 tablet, Rfl: 1  •  Januvia 100 MG tablet, TAKE 1 TABLET BY MOUTH EVERY DAY, Disp: 90 tablet, Rfl: 1  •  levocetirizine (XYZAL) 5 MG tablet, TAKE 1 TABLET BY MOUTH EVERY DAY IN THE EVENING, Disp: 90 tablet, Rfl: 1  •  levothyroxine 88 mcg tablet, TAKE 1 TABLET BY MOUTH EVERY DAY, Disp: 30 tablet, Rfl: 0  •  lisinopril (ZESTRIL) 2.5 mg tablet, TAKE 1 TABLET BY MOUTH EVERY DAY, Disp: 90 tablet, Rfl: 2  •  metFORMIN (GLUCOPHAGE) 500 mg tablet, TAKE 2 TABLETS BY MOUTH TWICE A DAY, Disp: 360 tablet, Rfl: 1  •  oxybutynin (DITROPAN-XL) 10 MG 24 hr tablet, TAKE 2 TABLETS (20 MG TOTAL) BY MOUTH DAILY AT BEDTIME, Disp: 180 tablet, Rfl: 0  •  polyethylene glycol (MIRALAX) 17 g packet, Take 17 g by mouth daily for 14 days, Disp: 255 g, Rfl: 0  •  senna (SENOKOT) 8.6 MG tablet, Take 1 tablet by mouth daily, Disp: , Rfl:   •  spironolactone (ALDACTONE) 25 mg tablet, TAKE 1 TABLET BY MOUTH EVERY DAY, Disp: 90 tablet, Rfl: 1  •  metoprolol tartrate (LOPRESSOR) 100 mg tablet, TAKE 1 TABLET BY MOUTH EVERY 12 HOURS (Patient not taking: Reported on 7/28/2023), Disp: 180 tablet, Rfl: 3  •  Multiple Vitamins-Minerals (CENTRUM SILVER PO), Take 1 tablet by mouth daily, Disp: , Rfl:   •  ondansetron (ZOFRAN) 8 mg tablet, TAKE 0.5 TABLETS (4 MG TOTAL) BY MOUTH EVERY 8 (EIGHT) HOURS AS NEEDED FOR NAUSEA OR VOMITING, Disp: 18 tablet, Rfl: 29  •  pantoprazole (PROTONIX) 40 mg tablet, TAKE 1 TABLET BY MOUTH TWICE A DAY, Disp: 180 tablet, Rfl: 3    Current Allergies     Allergies as of 07/28/2023 - Reviewed 07/28/2023   Allergen Reaction Noted   • Butoconazole  01/28/2014   • Moxifloxacin  01/28/2014   • Neomycin-bacitracin zn-polymyx  01/28/2014   • Smz-tmp ds [sulfamethoxazole-trimethoprim]  05/03/2018   • Sulfa antibiotics  05/16/2019   • Telithromycin Diarrhea 01/28/2014            The following portions of the patient's history were reviewed and updated as appropriate: allergies, current medications, past family history, past medical history, past social history, past surgical history and problem list.     Past Medical History:   Diagnosis Date   • Anxiety disorder    • Atrial fibrillation ([address])    • Cardiac arrhythmia    • Cardiomyopathy ([address])    • Colitis    • Diabetes mellitus ([address])    • Dry eyes    • Dry mouth    • HTN (hypertension)    • Hyperlipidemia    • IBS (irritable bowel syndrome)    • MVP (mitral valve prolapse)    • Osteoporosis    • SOB (shortness of breath)    • Tachycardia        Past Surgical History:   Procedure Laterality Date   • CATARACT EXTRACTION Bilateral    • DILATION AND CURETTAGE OF UTERUS      x2   • WRIST SURGERY         Family History   Problem Relation Age of Onset   • Heart attack Mother    • Diabetes Mother    • Heart attack Father    • Colon cancer Sister         unknown age   • Stomach cancer Sister    • Stomach cancer Sister 54   • No Known Problems Sister    • No Known Problems Sister    • No Known Problems Sister    • No Known Problems Maternal Grandmother    • No Known Problems Paternal Grandmother    • No Known Problems Maternal Aunt    • No Known Problems Paternal Aunt    • No Known Problems Paternal Aunt    • Breast cancer Neg Hx          Medications have been verified. Objective   /97   Pulse (!) 139   Temp 99.6 °F (37.6 °C) (Temporal)   Resp 20   SpO2 97%        Physical Exam     Physical Exam  Vitals reviewed. Constitutional:       General: She is not in acute distress. Appearance: Normal appearance.    HENT:      Right Ear: Tympanic membrane, ear canal and external ear normal.      Left Ear: Tympanic membrane, ear canal and external ear normal.      Nose: Nose normal.      Mouth/Throat:      Mouth: Mucous membranes are moist.      Pharynx: No posterior oropharyngeal erythema. Eyes:      Conjunctiva/sclera: Conjunctivae normal.   Cardiovascular:      Rate and Rhythm: Normal rate. Rhythm irregular. Pulses: Normal pulses. Heart sounds: Normal heart sounds. No murmur heard. Pulmonary:      Effort: Pulmonary effort is normal. No respiratory distress. Breath sounds: Normal breath sounds. No decreased breath sounds, wheezing or rhonchi. Skin:     General: Skin is warm and dry. Neurological:      General: No focal deficit present. Mental Status: She is alert and oriented to person, place, and time.    Psychiatric:         Mood and Affect: Mood normal.         Behavior: Behavior normal.

## 2023-07-28 NOTE — LETTER
July 28, 2023     Terra Thomas MD  [address] [address] [facility] [address] [facility]    Patient: Carlton Query   YOB: 1945   Date of Visit: 7/28/2023        Dear Terra Thomas MD:    Your patient, Susanna Ortiz was seen in our urgent care department on 7/28/2023. Enclosed is a full report of that visit. Multiple concerns primary social/living environment that son made practice aware of and that needs follow up case management regarding. If you have any questions or concerns, please don't hesitate to call.            Sincerely,        ADA Billings      CC: [unfilled]    Enclosure

## 2023-07-28 NOTE — PATIENT INSTRUCTIONS
*Call your family practice Phone [phone]. *Take all of the antibiotic medication. Go to the ER with chest pain, dizziness, a fall, shortness of breath, worsening pain.

## 2023-07-28 NOTE — TELEPHONE ENCOUNTER
07/28/23 11:54 AM    Patient contacted post ED visit, first outreach attempt made. Message was left for patient to return a call to the VBI Department at [facility]: Phone [phone]. Thank you.   Madeleine Barth MA  [facility]

## 2023-07-29 LAB — BACTERIA UR CULT: NORMAL

## 2023-07-30 DIAGNOSIS — L65.9 ALOPECIA: ICD-10-CM

## 2023-07-30 DIAGNOSIS — N32.81 OAB (OVERACTIVE BLADDER): ICD-10-CM

## 2023-07-30 DIAGNOSIS — E11.9 TYPE 2 DIABETES MELLITUS WITHOUT COMPLICATION, WITHOUT LONG-TERM CURRENT USE OF INSULIN (HCC): ICD-10-CM

## 2023-07-30 DIAGNOSIS — I48.20 CHRONIC A-FIB (HCC): ICD-10-CM

## 2023-07-30 DIAGNOSIS — E78.2 MIXED HYPERLIPIDEMIA: ICD-10-CM

## 2023-07-30 RX ORDER — OXYBUTYNIN CHLORIDE 10 MG/1
20 TABLET, EXTENDED RELEASE ORAL
Qty: 180 TABLET | Refills: 0 | Status: SHIPPED | OUTPATIENT
Start: 2023-07-30

## 2023-07-30 RX ORDER — SITAGLIPTIN 100 MG/1
TABLET, FILM COATED ORAL
Qty: 90 TABLET | Refills: 1 | Status: SHIPPED | OUTPATIENT
Start: 2023-07-30

## 2023-07-30 RX ORDER — SPIRONOLACTONE 25 MG/1
TABLET ORAL
Qty: 90 TABLET | Refills: 1 | Status: SHIPPED | OUTPATIENT
Start: 2023-07-30

## 2023-07-30 RX ORDER — EZETIMIBE AND SIMVASTATIN 10; 20 MG/1; MG/1
TABLET ORAL
Qty: 90 TABLET | Refills: 1 | Status: SHIPPED | OUTPATIENT
Start: 2023-07-30

## 2023-07-31 ENCOUNTER — TELEPHONE (OUTPATIENT)
Dept: URGENT CARE | Facility: CLINIC | Age: 78
End: 2023-07-31

## 2023-07-31 DIAGNOSIS — S62.617A CLOSED DISPLACED FRACTURE OF PROXIMAL PHALANX OF LEFT LITTLE FINGER, INITIAL ENCOUNTER: Primary | ICD-10-CM

## 2023-07-31 NOTE — TELEPHONE ENCOUNTER
07/31/23 9:02 AM    Patient contacted post ED visit, second outreach attempt made. Message was left for patient to return a call to the VBI Department at [facility]: Phone [phone]. Thank you.   Madeleine Barth MA  [facility]

## 2023-08-01 LAB
BACTERIA BLD CULT: NORMAL
BACTERIA BLD CULT: NORMAL

## 2023-08-01 RX ORDER — DIGOXIN 125 MCG
TABLET ORAL
Qty: 90 TABLET | Refills: 3 | Status: SHIPPED | OUTPATIENT
Start: 2023-08-01

## 2023-08-01 RX ORDER — APIXABAN 5 MG/1
TABLET, FILM COATED ORAL
Qty: 180 TABLET | Refills: 3 | Status: SHIPPED | OUTPATIENT
Start: 2023-08-01

## 2023-08-01 NOTE — TELEPHONE ENCOUNTER
08/01/23 12:43 PM    Patient contacted post ED visit, third outreach attempt made. Message was left for patient to return a call to either the VBI Department at [facility]: Phone [phone] or the PCP office. No my chart   Thank you.   Madeleine Barth MA  [facility]

## 2023-08-05 DIAGNOSIS — E03.9 HYPOTHYROIDISM, UNSPECIFIED TYPE: ICD-10-CM

## 2023-08-05 RX ORDER — LEVOTHYROXINE SODIUM 88 UG/1
88 TABLET ORAL DAILY
Qty: 90 TABLET | Refills: 0 | Status: SHIPPED | OUTPATIENT
Start: 2023-08-05 | End: 2023-09-18

## 2023-08-08 ENCOUNTER — TELEPHONE (OUTPATIENT)
Age: 78
End: 2023-08-08

## 2023-08-08 NOTE — TELEPHONE ENCOUNTER
Patient is being referred to a orthopedics. Please schedule accordingly.     [address] [employer]   [phone]

## 2023-08-17 ENCOUNTER — OFFICE VISIT (OUTPATIENT)
Dept: CARDIOLOGY CLINIC | Facility: CLINIC | Age: 78
End: 2023-08-17
Payer: MEDICARE

## 2023-08-17 VITALS
DIASTOLIC BLOOD PRESSURE: 72 MMHG | HEART RATE: 100 BPM | SYSTOLIC BLOOD PRESSURE: 122 MMHG | HEIGHT: 64 IN | BODY MASS INDEX: 18.27 KG/M2 | RESPIRATION RATE: 16 BRPM | OXYGEN SATURATION: 98 % | WEIGHT: 107 LBS

## 2023-08-17 DIAGNOSIS — I48.20 CHRONIC A-FIB (HCC): Primary | ICD-10-CM

## 2023-08-17 DIAGNOSIS — I10 HYPERTENSION, ESSENTIAL: ICD-10-CM

## 2023-08-17 DIAGNOSIS — Z79.01 CHRONIC ANTICOAGULATION: ICD-10-CM

## 2023-08-17 PROCEDURE — 99214 OFFICE O/P EST MOD 30 MIN: CPT | Performed by: INTERNAL MEDICINE

## 2023-08-17 RX ORDER — METOPROLOL TARTRATE 50 MG/1
50 TABLET, FILM COATED ORAL EVERY 12 HOURS
Qty: 60 TABLET | Refills: 5 | Status: SHIPPED | OUTPATIENT
Start: 2023-08-17

## 2023-08-17 NOTE — PROGRESS NOTES
[facility] [place] [id number] [facility] [place] [address]  Cardiology Follow Up    Shira Lerma  1945  [id number]      1. Chronic a-fib ([address])        2. Hypertension, essential        3. Chronic anticoagulation            Chief Complaint   Patient presents with   • Follow-up       Interval History: Patient presents for follow-up visit. Patient does have some shortness of breath with exertion as well as palpitations. Patient for some reason has not been taking the metoprolol. She does have history of atrial fibrillation with rapid ventricular response in the past.  No recent cardiac work-up. Patient denies any bleeding issues. She states otherwise that she has been compliant with rest of the medications.     Patient Active Problem List   Diagnosis   • Atrial fibrillation ([address])   • Cardiomyopathy ([address])   • Hypertension   • Hyperlipidemia   • Chest pain   • Pelvic pain   • Ankle joint effusion   • Anxiety   • Arthritis   • Type 2 diabetes mellitus with diabetic peripheral angiopathy without gangrene (HCC)   • Irritable bowel syndrome   • Mitral valvular disorder   • Neuropathy   • Peripheral vascular disease (HCC)   • Reflex sympathetic dystrophy of other specified site   • Hypothyroidism   • Age-related osteoporosis without current pathological fracture   • Stage 3a chronic kidney disease (HCC)   • Protein-calorie malnutrition, unspecified severity (HCC)     Past Medical History:   Diagnosis Date   • Anxiety disorder    • Atrial fibrillation ([address])    • Cardiac arrhythmia    • Cardiomyopathy ([address])    • Colitis    • Diabetes mellitus ([address])    • Dry eyes    • Dry mouth    • HTN (hypertension)    • Hyperlipidemia    • IBS (irritable bowel syndrome)    • MVP (mitral valve prolapse)    • Osteoporosis    • SOB (shortness of breath)    • Tachycardia      Social History     Socioeconomic History   • Marital status: /Civil Union     Spouse name: Not on file   • Number of children: Not on file • Years of education: Not on file   • Highest education level: Not on file   Occupational History   • Occupation: Retired   Tobacco Use   • Smoking status: Never   • Smokeless tobacco: Never   Vaping Use   • Vaping Use: Never used   Substance and Sexual Activity   • Alcohol use: No   • Drug use: Never   • Sexual activity: Not Currently   Other Topics Concern   • Not on file   Social History Narrative    · Most recent tobacco use screenin2019      · Do you currently or have you served in the [address] [place] [address] [employer]:   No      · Were you activated, into active duty, as a member of the [employer] or as a Reservist:   No      · Sexual orientation:   Heterosexual      · Diet:   Diabetic       · Caffeine intake:   Occasional      · Seat belts used routinely:   Yes      · Sunscreen used routinely:   No      · Smoke alarm in home: Yes      · Advance directive:    Yes      · Salt Intake:   not much      Social Determinants of Health     Financial Resource Strain: Not on file   Food Insecurity: Not on file   Transportation Needs: Not on file   Physical Activity: Not on file   Stress: Not on file   Social Connections: Not on file   Intimate Partner Violence: Not on file   Housing Stability: Not on file      Family History   Problem Relation Age of Onset   • Heart attack Mother    • Diabetes Mother    • Heart attack Father    • Colon cancer Sister         unknown age   • Stomach cancer Sister    • Stomach cancer Sister 54   • No Known Problems Sister    • No Known Problems Sister    • No Known Problems Sister    • No Known Problems Maternal Grandmother    • No Known Problems Paternal Grandmother    • No Known Problems Maternal Aunt    • No Known Problems Paternal Aunt    • No Known Problems Paternal Aunt    • Breast cancer Neg Hx      Past Surgical History:   Procedure Laterality Date   • CATARACT EXTRACTION Bilateral    • DILATION AND CURETTAGE OF UTERUS      x2   • WRIST SURGERY         Current Outpatient Medications:   •  Cholecalciferol (VITAMIN D3 PO), Vitamin D3, Disp: , Rfl:   •  cyclobenzaprine (FLEXERIL) 10 mg tablet, TAKE 1 TABLET BY MOUTH THREE TIMES A DAY AS NEEDED FOR MUSCLE SPASMS, Disp: 270 tablet, Rfl: 1  •  denosumab (PROLIA) 60 mg/mL, Inject 60 mg under the skin every 6 (six) months, Disp: , Rfl:   •  dicyclomine (BENTYL) 10 mg capsule, TAKE 1 CAPSULE (10 MG TOTAL) BY MOUTH EVERY 6 (SIX) HOURS AS NEEDED (NAUSEA), Disp: 360 capsule, Rfl: 1  •  digoxin (LANOXIN) 0.125 mg tablet, TAKE 1 TABLET BY MOUTH EVERY DAY, Disp: 90 tablet, Rfl: 3  •  Eliquis 5 MG, TAKE 1 TABLET BY MOUTH TWICE A DAY, Disp: 180 tablet, Rfl: 3  •  ezetimibe-simvastatin (VYTORIN) 10-20 mg per tablet, TAKE 1 TABLET BY MOUTH EVERYDAY AT BEDTIME, Disp: 90 tablet, Rfl: 1  •  Januvia 100 MG tablet, TAKE 1 TABLET BY MOUTH EVERY DAY, Disp: 90 tablet, Rfl: 1  •  levocetirizine (XYZAL) 5 MG tablet, TAKE 1 TABLET BY MOUTH EVERY DAY IN THE EVENING, Disp: 90 tablet, Rfl: 1  •  levothyroxine 88 mcg tablet, Take 1 tablet (88 mcg total) by mouth daily, Disp: 90 tablet, Rfl: 0  •  lisinopril (ZESTRIL) 2.5 mg tablet, TAKE 1 TABLET BY MOUTH EVERY DAY, Disp: 90 tablet, Rfl: 2  •  metFORMIN (GLUCOPHAGE) 500 mg tablet, TAKE 2 TABLETS BY MOUTH TWICE A DAY, Disp: 360 tablet, Rfl: 1  •  Multiple Vitamins-Minerals (CENTRUM SILVER PO), Take 1 tablet by mouth daily, Disp: , Rfl:   •  ondansetron (ZOFRAN) 8 mg tablet, TAKE 0.5 TABLETS (4 MG TOTAL) BY MOUTH EVERY 8 (EIGHT) HOURS AS NEEDED FOR NAUSEA OR VOMITING, Disp: 18 tablet, Rfl: 29  •  oxybutynin (DITROPAN-XL) 10 MG 24 hr tablet, TAKE 2 TABLETS (20 MG TOTAL) BY MOUTH DAILY AT BEDTIME, Disp: 180 tablet, Rfl: 0  •  pantoprazole (PROTONIX) 40 mg tablet, TAKE 1 TABLET BY MOUTH TWICE A DAY, Disp: 180 tablet, Rfl: 3  •  senna (SENOKOT) 8.6 MG tablet, Take 1 tablet by mouth daily, Disp: , Rfl:   •  spironolactone (ALDACTONE) 25 mg tablet, TAKE 1 TABLET BY MOUTH EVERY DAY, Disp: 90 tablet, Rfl: 1  Allergies Allergen Reactions   • Butoconazole    • Moxifloxacin    • Neomycin-Bacitracin Zn-Polymyx    • Smz-Tmp Ds [Sulfamethoxazole-Trimethoprim]    • Sulfa Antibiotics    • Telithromycin Diarrhea       Labs:  Admission on 07/26/2023, Discharged on 07/27/2023   Component Date Value   • Ventricular Rate 07/26/2023 132    • Atrial Rate 07/26/2023 131    • QRSD Interval 07/26/2023 82    • QT Interval 07/26/2023 292    • QTC Interval 07/26/2023 432    • QRS Axis 07/26/2023 79    • T Wave Axis 07/26/2023 -7    • Color, UA 07/26/2023 Light Yellow    • Clarity, UA 07/26/2023 Turbid    • Specific Gravity, UA 07/26/2023 1.014    • pH, UA 07/26/2023 5.5    • Leukocytes, UA 07/26/2023 Large (A)    • Nitrite, UA 07/26/2023 Positive (A)    • Protein, UA 07/26/2023 Negative    • Glucose, UA 07/26/2023 Negative    • Ketones, UA 07/26/2023 Trace (A)    • Urobilinogen, UA 07/26/2023 <2.0    • Bilirubin, UA 07/26/2023 Negative    • Occult Blood, UA 07/26/2023 Negative    • WBC 07/26/2023 9.33    • RBC 07/26/2023 4.11    • Hemoglobin 07/26/2023 13.0    • Hematocrit 07/26/2023 38.4    • MCV 07/26/2023 93    • MCH 07/26/2023 31.6    • MCHC 07/26/2023 33.9    • RDW 07/26/2023 11.8    • MPV 07/26/2023 8.8 (L)    • Platelets 34/90/4222 297    • nRBC 07/26/2023 0    • Neutrophils Relative 07/26/2023 72    • Immat GRANS % 07/26/2023 0    • Lymphocytes Relative 07/26/2023 18    • Monocytes Relative 07/26/2023 8    • Eosinophils Relative 07/26/2023 1    • Basophils Relative 07/26/2023 1    • Neutrophils Absolute 07/26/2023 6.71    • Immature Grans Absolute 07/26/2023 0.04    • Lymphocytes Absolute 07/26/2023 1.72    • Monocytes Absolute 07/26/2023 0.75    • Eosinophils Absolute 07/26/2023 0.06    • Basophils Absolute 07/26/2023 0.05    • Sodium 07/26/2023 131 (L)    • Potassium 07/26/2023 4.8    • Chloride 07/26/2023 95 (L)    • CO2 07/26/2023 26    • ANION GAP 07/26/2023 10    • BUN 07/26/2023 26 (H)    • Creatinine 07/26/2023 1.42 (H)    • Glucose 07/26/2023 132    • Calcium 07/26/2023 10.4 (H)    • AST 07/26/2023 16    • ALT 07/26/2023 10    • Alkaline Phosphatase 07/26/2023 126 (H)    • Total Protein 07/26/2023 8.6 (H)    • Albumin 07/26/2023 4.6    • Total Bilirubin 07/26/2023 0.72    • eGFR 07/26/2023 35    • LACTIC ACID 07/26/2023 0.9    • hs TnI 0hr 07/26/2023 6    • Protime 07/26/2023 16.9 (H)    • INR 07/26/2023 1.40 (H)    • Digoxin Lvl 07/26/2023 1.5    • Magnesium 07/26/2023 1.6 (L)    • BNP 07/26/2023 43    • TSH 3RD GENERATON 07/26/2023 0.296 (L)    • Blood Culture 07/26/2023 No Growth After 5 Days. • Blood Culture 07/26/2023 No Growth After 5 Days. • hs TnI 2hr 07/26/2023 6    • Delta 2hr hsTnI 07/26/2023 0    • RBC, UA 07/26/2023 1-2    • WBC, UA 07/26/2023 Innumerable (A)    • Epithelial Cells 07/26/2023 Occasional    • Bacteria, UA 07/26/2023 Occasional    • Urine Culture 07/26/2023 60,000-69,000 cfu/ml    • Magnesium 07/26/2023 2.7    Office Visit on 07/26/2023   Component Date Value   • LEUKOCYTE ESTERASE,UA 07/26/2023 large    • NITRITE,UA 07/26/2023 positive    • SL AMB POCT UROBILINOGEN 07/26/2023 0.2    • POCT URINE PROTEIN 07/26/2023 negative    •  PH,UA 07/26/2023 5.0    • BLOOD,UA 07/26/2023 trace    • SPECIFIC GRAVITY,UA 07/26/2023 1.010    • KETONES,UA 07/26/2023 negative    • BILIRUBIN,UA 07/26/2023 negative    • GLUCOSE, UA 07/26/2023 negative    •  COLOR,UA 07/26/2023 shaji    • CLARITY,UA 07/26/2023 cloudy    • Urine Culture 07/26/2023 >100,000 cfu/ml Escherichia coli (A)    • Urine Culture 07/26/2023 40,000-49,000 cfu/ml    • Ventricular Rate 07/26/2023 137    • Atrial Rate 07/26/2023 137    • QRSD Interval 07/26/2023 82    • QT Interval 07/26/2023 290    • QTC Interval 07/26/2023 437    • QRS Axis 07/26/2023 76    • T Wave Axis 07/26/2023 228      Imaging: XR ribs left w pa chest min 3 views    Result Date: 7/29/2023  Narrative: LEFT RIBS AND CHEST INDICATION:   R07.81: Pleurodynia.  COMPARISON: 0726 2023 VIEWS:  XR RIBS LEFT W PA CHEST MIN 3 VIEWS Images: 4 FINDINGS: Cardiomediastinal silhouette appears unremarkable. Minimal scarring or atelectasis in the lingula. No pleural effusions. There is no pneumothorax. No rib fractures are identified. Impression: No acute cardiopulmonary disease. No evidence of rib fractures. Workstation performed: [id number]     XR hand 3+ views LEFT    Result Date: 7/27/2023  Narrative: LEFT HAND INDICATION:   injury/ecchymosis. Fall 1 week ago with soreness COMPARISON:  None VIEWS:  XR HAND 3+ VW LEFT For the purposes of institution wide universal language the following terms will apply: (thumb=1st digit/finger, index finger=2nd digit/finger, long finger=3rd digit/finger, ring=4th digit/finger and small finger=5th digit/finger) FINDINGS: There is an acute corner fracture of the fifth proximal phalanx which appears to be intra-articular. Degenerative changes of the first [facility] joint. No lytic or blastic osseous lesion. Soft tissue swelling surrounding the fifth MCP joint is noted. Impression: Minimally displaced fracture of the fifth proximal phalanx. The study was marked in [facility] for immediate notification. Workstation performed: [id number]     XR chest 1 view portable    Result Date: 7/27/2023  Narrative: CHEST INDICATION:   a-fib. COMPARISON: Abdomen CT 7/26/2023 CXR 5/8/2017. EXAM PERFORMED/VIEWS:  XR CHEST PORTABLE. FINDINGS: Cardiomediastinal silhouette normal. No acute disease. Stable benign linear scar in the left mid lung. No effusion or pneumothorax. Upper abdomen normal. Bones normal for age. Impression: No acute cardiopulmonary disease. Workstation performed: [id number]     CT abdomen pelvis wo contrast    Result Date: 7/27/2023  Narrative: CT ABDOMEN AND PELVIS WITHOUT IV CONTRAST INDICATION:   LLQ abdominal pain. COMPARISON: CT of the abdomen pelvis on August 13, 2020.  TECHNIQUE:  CT examination of the abdomen and pelvis was performed without intravenous contrast. Multiplanar 2D reformatted images were created from the source data. This examination, like all CT scans performed in the [facility], was performed utilizing techniques to minimize radiation dose exposure, including the use of iterative reconstruction and automated exposure control. Radiation dose length product (DLP) for this visit:  554 mGy-cm Enteric contrast was not administered. FINDINGS: ABDOMEN LOWER CHEST:  No clinically significant abnormality identified in the visualized lower chest. LIVER/BILIARY TREE:  Unremarkable. GALLBLADDER:  No calcified gallstones. No pericholecystic inflammatory change. SPLEEN:  Unremarkable. PANCREAS:  Unremarkable. ADRENAL GLANDS:  Unremarkable. KIDNEYS/URETERS:  One or more simple renal cyst(s) is noted. Otherwise unremarkable kidneys. No hydronephrosis. STOMACH AND BOWEL: Distention of the large bowel with stool compatible with constipation. No small bowel obstruction. APPENDIX:  No findings to suggest appendicitis. ABDOMINOPELVIC CAVITY:  No ascites. No pneumoperitoneum. No lymphadenopathy. VESSELS:  Unremarkable for patient's age. PELVIS REPRODUCTIVE ORGANS:  Unremarkable for patient's age. URINARY BLADDER:  Unremarkable. ABDOMINAL WALL/INGUINAL REGIONS:  Unremarkable. OSSEOUS STRUCTURES:  No acute fracture or destructive osseous lesion. Spinal degenerative changes are noted. Chronic bilateral pars interarticularis defects at L5 without significant listhesis. Mild compression deformity of L3. Impression: Distention of the large bowel with stool compatible with constipation. Workstation performed: [id number]     CT head without contrast    Result Date: 7/27/2023  Narrative: CT BRAIN - WITHOUT CONTRAST INDICATION:   near syncope. COMPARISON:  None. TECHNIQUE:  CT examination of the brain was performed. Multiplanar 2D reformatted images were created from the source data. Radiation dose length product (DLP) for this visit:  873 mGy-cm .   This examination, like all CT scans performed in the [facility], was performed utilizing techniques to minimize radiation dose exposure, including the use of iterative reconstruction and automated exposure control. IMAGE QUALITY:  Diagnostic. FINDINGS: PARENCHYMA: Decreased attenuation is noted in periventricular and subcortical white matter demonstrating an appearance that is statistically most likely to represent moderate microangiopathic change. No CT signs of acute infarction. No intracranial mass, mass effect or midline shift. No acute parenchymal hemorrhage. VENTRICLES AND EXTRA-AXIAL SPACES:  Normal for the patient's age. VISUALIZED ORBITS: Normal visualized orbits. PARANASAL SINUSES: Normal visualized paranasal sinuses. CALVARIUM AND EXTRACRANIAL SOFT TISSUES:  Normal.     Impression: No acute intracranial abnormality. Workstation performed: [id number]       Review of Systems:  Review of Systems   REVIEW OF SYSTEMS:  Constitutional:  Denies fever or chills   Eyes:  Denies change in visual acuity   HENT:  Denies nasal congestion or sore throat   Respiratory:  shortness of breath   Cardiovascular:  Denies chest pain or edema   GI:  Denies abdominal pain, nausea, vomiting, bloody stools or diarrhea   :  Denies dysuria, frequency, difficulty in micturition and nocturia  Musculoskeletal:  Denies back pain or joint pain   Neurologic:  Denies headache, focal weakness or sensory changes   Endocrine:  Denies polyuria or polydipsia   Lymphatic:  Denies swollen glands   Psychiatric:  Denies depression or anxiety    Physical Exam:    /72 (BP Location: Left arm, Patient Position: Sitting, Cuff Size: Standard)   Pulse 100   Resp 16   Ht 5' 4" (1.626 m)   Wt 48.5 kg (107 lb)   SpO2 98%   BMI 18.37 kg/m²     Physical Exam   PHYSICAL EXAM:  General:  Patient is not in acute distress   Head: Normocephalic, Atraumatic. HEENT:  Both pupils normal-size atraumatic, normocephalic, nonicteric  Neck:  JVP not raised.  Trachea central. No carotid bruit  Respiratory:  normal breath sounds no crackles. no rhonchi  Cardiovascular: Irregularly irregular  GI:  Abdomen soft nontender. No organomegaly. Lymphatic:  No cervical or inguinal lymphadenopathy  Neurologic:  Patient is awake alert, oriented . Grossly nonfocal  Extremities no edema    Discussion/Summary:  Patient with multiple medical problems who seems to be doing reasonably well from cardiac standpoint. Previous studies reviewed with patient. Medications reviewed and possible side effects discussed. concepts of cardiovascular disease , signs and symptoms of heart disease. Dietary and risk factor modification reinforced. All questions answered. Safety measures reviewed. Patient advised to report any problems prompting medical attention. Patient will continue digoxin 125 mcg p.o. daily. Patient will also take metoprolol 50 mg twice a day. Prescription sent to her pharmacy. Patient will be scheduled for Holter monitor to assess A-fib heart rate response. Patient also be scheduled for an echocardiogram to evaluate ejection fraction and assess for any evidence of valvular abnormalities. Symptoms to watch her from cardiac standpoint which would indicate the need for further cardiac evaluation discussed with patient. Follow-up in 6 months or earlier as needed. Patient is agreeable with the plan of care.

## 2023-09-05 DIAGNOSIS — I10 HYPERTENSION, ESSENTIAL: ICD-10-CM

## 2023-09-05 RX ORDER — LISINOPRIL 2.5 MG/1
2.5 TABLET ORAL DAILY
Qty: 90 TABLET | Refills: 2 | Status: SHIPPED | OUTPATIENT
Start: 2023-09-05

## 2023-09-05 NOTE — TELEPHONE ENCOUNTER
Name of Caller: Patient   Call back Number: [phone]    Medication(s): lisinopril (ZESTRIL) 2.5 mg tablet   Are we prescribing provider?:    30 or 90 day supply: 90 day     Pharmacy name/number: [id number]- [place], [place] - [address]      Last or Next appt?: 12/28/23

## 2023-09-05 NOTE — TELEPHONE ENCOUNTER
lisinopril (ZESTRIL) 2.5 mg tablet         Sig: Take 1 tablet (2.5 mg total) by mouth daily    Disp:  90 tablet    Refills:  2    Start: 9/5/2023    Class: Normal    For: Hypertension, essential    Last ordered: 10 months ago (10/21/2022) by Nida Gomez MD    Cardiovascular:  ACE Inhibitors Failed 09/05/2023 10:51 AM   Protocol Details  eGFR is 60 or above and within 360 days    BP completed in the last 6 months    K is 5.3 or below and within 360 days    Valid encounter within last 6 months      To be filled at: [facility] #[id number]- [place], [place] - [address]   Please review and refill if possible  Thanks!

## 2023-09-18 DIAGNOSIS — E03.9 HYPOTHYROIDISM, UNSPECIFIED TYPE: ICD-10-CM

## 2023-09-18 RX ORDER — LEVOTHYROXINE SODIUM 88 UG/1
88 TABLET ORAL DAILY
Qty: 90 TABLET | Refills: 0 | Status: SHIPPED | OUTPATIENT
Start: 2023-09-18

## 2023-10-13 ENCOUNTER — APPOINTMENT (OUTPATIENT)
Dept: LAB | Facility: CLINIC | Age: 78
End: 2023-10-13
Payer: MEDICARE

## 2023-10-13 DIAGNOSIS — E03.4 HYPOTHYROIDISM DUE TO ACQUIRED ATROPHY OF THYROID: ICD-10-CM

## 2023-10-13 DIAGNOSIS — E78.2 MIXED HYPERLIPIDEMIA: ICD-10-CM

## 2023-10-13 DIAGNOSIS — E11.51 TYPE 2 DIABETES MELLITUS WITH DIABETIC PERIPHERAL ANGIOPATHY WITHOUT GANGRENE, WITHOUT LONG-TERM CURRENT USE OF INSULIN (HCC): ICD-10-CM

## 2023-10-13 LAB
ALBUMIN SERPL BCP-MCNC: 4.5 G/DL (ref 3.5–5)
ALP SERPL-CCNC: 97 U/L (ref 34–104)
ALT SERPL W P-5'-P-CCNC: 8 U/L (ref 7–52)
ANION GAP SERPL CALCULATED.3IONS-SCNC: 10 MMOL/L
AST SERPL W P-5'-P-CCNC: 17 U/L (ref 13–39)
BILIRUB SERPL-MCNC: 0.83 MG/DL (ref 0.2–1)
BUN SERPL-MCNC: 26 MG/DL (ref 5–25)
CALCIUM SERPL-MCNC: 9.6 MG/DL (ref 8.4–10.2)
CHLORIDE SERPL-SCNC: 97 MMOL/L (ref 96–108)
CHOLEST SERPL-MCNC: 137 MG/DL
CO2 SERPL-SCNC: 28 MMOL/L (ref 21–32)
CREAT SERPL-MCNC: 1.31 MG/DL (ref 0.6–1.3)
EST. AVERAGE GLUCOSE BLD GHB EST-MCNC: 186 MG/DL
GFR SERPL CREATININE-BSD FRML MDRD: 39 ML/MIN/1.73SQ M
GLUCOSE P FAST SERPL-MCNC: 160 MG/DL (ref 65–99)
HBA1C MFR BLD: 8.1 %
HDLC SERPL-MCNC: 41 MG/DL
LDLC SERPL CALC-MCNC: 69 MG/DL (ref 0–100)
POTASSIUM SERPL-SCNC: 4.9 MMOL/L (ref 3.5–5.3)
PROT SERPL-MCNC: 7.8 G/DL (ref 6.4–8.4)
SODIUM SERPL-SCNC: 135 MMOL/L (ref 135–147)
TRIGL SERPL-MCNC: 136 MG/DL
TSH SERPL DL<=0.05 MIU/L-ACNC: 0.06 UIU/ML (ref 0.45–4.5)

## 2023-10-13 PROCEDURE — 80053 COMPREHEN METABOLIC PANEL: CPT

## 2023-10-13 PROCEDURE — 83036 HEMOGLOBIN GLYCOSYLATED A1C: CPT

## 2023-10-13 PROCEDURE — 36415 COLL VENOUS BLD VENIPUNCTURE: CPT

## 2023-10-13 PROCEDURE — 84439 ASSAY OF FREE THYROXINE: CPT

## 2023-10-13 PROCEDURE — 84443 ASSAY THYROID STIM HORMONE: CPT

## 2023-10-13 PROCEDURE — 80061 LIPID PANEL: CPT

## 2023-10-14 LAB — T4 FREE SERPL-MCNC: 1.72 NG/DL (ref 0.61–1.12)

## 2023-10-16 ENCOUNTER — RA CDI HCC (OUTPATIENT)
Dept: OTHER | Facility: HOSPITAL | Age: 78
End: 2023-10-16

## 2023-10-16 NOTE — PROGRESS NOTES
E11.22   [address] [facility] coding opportunities          Chart Reviewed number of suggestions sent to Provider: 1     Patients Insurance     Medicare Insurance: [employer]

## 2023-10-18 ENCOUNTER — OFFICE VISIT (OUTPATIENT)
Dept: FAMILY MEDICINE CLINIC | Facility: CLINIC | Age: 78
End: 2023-10-18

## 2023-10-18 VITALS
BODY MASS INDEX: 18.95 KG/M2 | RESPIRATION RATE: 16 BRPM | TEMPERATURE: 97.2 F | HEIGHT: 64 IN | HEART RATE: 84 BPM | DIASTOLIC BLOOD PRESSURE: 76 MMHG | SYSTOLIC BLOOD PRESSURE: 122 MMHG | OXYGEN SATURATION: 99 % | WEIGHT: 111 LBS

## 2023-10-18 DIAGNOSIS — N18.31 STAGE 3A CHRONIC KIDNEY DISEASE (HCC): ICD-10-CM

## 2023-10-18 DIAGNOSIS — E11.51 TYPE 2 DIABETES MELLITUS WITH DIABETIC PERIPHERAL ANGIOPATHY WITHOUT GANGRENE, WITHOUT LONG-TERM CURRENT USE OF INSULIN (HCC): ICD-10-CM

## 2023-10-18 DIAGNOSIS — E03.4 HYPOTHYROIDISM DUE TO ACQUIRED ATROPHY OF THYROID: ICD-10-CM

## 2023-10-18 DIAGNOSIS — M81.0 AGE-RELATED OSTEOPOROSIS WITHOUT CURRENT PATHOLOGICAL FRACTURE: ICD-10-CM

## 2023-10-18 DIAGNOSIS — I10 PRIMARY HYPERTENSION: ICD-10-CM

## 2023-10-18 DIAGNOSIS — I42.9 CARDIOMYOPATHY, UNSPECIFIED TYPE (HCC): ICD-10-CM

## 2023-10-18 DIAGNOSIS — I48.91 ATRIAL FIBRILLATION, UNSPECIFIED TYPE (HCC): Primary | ICD-10-CM

## 2023-10-18 DIAGNOSIS — E78.2 MIXED HYPERLIPIDEMIA: ICD-10-CM

## 2023-10-18 DIAGNOSIS — F41.9 ANXIETY: ICD-10-CM

## 2023-10-18 NOTE — ASSESSMENT & PLAN NOTE
Patient to continue with weight bearing exercises as much as possible and oral intake of 1200 mg of calcium with 800 IU of Vit D to maximize bone protection. Pt  to continue  with DEXA scan every 2 years to reassess. All qustions regarding this problem answered today to patient satisfaction. Prolia given today.

## 2023-10-18 NOTE — PROGRESS NOTES
Name: Daniel Srivastava      : 1945      MRN: [id number]  Encounter Provider: Anthony Stinson MD  Encounter Date: 10/18/2023   Encounter department: [facility] [address]     1. Atrial fibrillation, unspecified type [facility])  Assessment & Plan:  Continue with anticogulation and rate control of heart rate. Concerns about condition were addressed today. 2. Cardiomyopathy, unspecified type [facility])  Assessment & Plan:  Patient is stable  and will continue present plan of care and reassess at next routine visit. All questions about this problem from patient were answered today. 3. Primary hypertension  Assessment & Plan:  Patient is stable with current anti-hypertensive medicine and continue to follow a low sodium diet and take current medication. All questions about this condition were answered today. 4. Mixed hyperlipidemia  Assessment & Plan:  Patient  is stable with current medication and we discussed a low fat low cholesterol diet. Weight loss also discussed for this will help lower cholesterol also. Recheck lipids in 6 months. 5. Anxiety  Assessment & Plan:  Patient to continue utilizing medical therapy as well as counseling sources as applicable to condition. If suicidal thought or fear of suicide attempt, to call 911 and seek help immediately. Medications and therapy reviewed today and all patient  questions answered today. 6. Type 2 diabetes mellitus with diabetic peripheral angiopathy without gangrene, without long-term current use of insulin (HCC)  Assessment & Plan:  Needs better control of sugar. Lab Results   Component Value Date    HGBA1C 8.1 (H) 10/13/2023       Orders:  -     Hemoglobin A1C; Future  -     Comprehensive metabolic panel; Future    7. Hypothyroidism due to acquired atrophy of thyroid  Assessment & Plan:  Patient to continue current dose of thyroid medicine and recheck TSH in 6 months       8.  Age-related osteoporosis without current pathological fracture  Assessment & Plan:  Patient to continue with weight bearing exercises as much as possible and oral intake of 1200 mg of calcium with 800 IU of Vit D to maximize bone protection. Pt  to continue  with DEXA scan every 2 years to reassess. All qustions regarding this problem answered today to patient satisfaction. Prolia given today. 9. Stage 3a chronic kidney disease [facility])  Assessment & Plan:  Lab Results   Component Value Date    EGFR 39 10/13/2023    EGFR 35 07/26/2023    EGFR 33 05/25/2023    CREATININE 1.31 (H) 10/13/2023    CREATININE 1.42 (H) 07/26/2023    CREATININE 1.49 (H) 05/25/2023   Pt to avoid NSAIDs and any IV dyes. Patient to follow up eoither with nephrology or  with us for  further  monitoring of  renal function. Patient's shoes and socks removed. Right Foot/Ankle   Right Foot Inspection  Skin Exam: skin normal. Skin not intact, no dry skin, no warmth, no callus, no erythema, no maceration, no abnormal color, no pre-ulcer, no ulcer and no callus. Toe Exam: ROM and strength within normal limits. No tenderness    Sensory   Vibration: intact  Proprioception: intact  Monofilament testing: intact    Vascular  Capillary refills: < 3 seconds  The right DP pulse is 2+. The right PT pulse is 2+. Left Foot/Ankle  Left Foot Inspection  Skin Exam: Skin not intact, no dry skin, no warmth, no erythema, no maceration, normal color, no pre-ulcer, no ulcer and no callus. Toe Exam: No swelling and no tenderness. Sensory   Vibration: intact  Proprioception: intact  Monofilament testing: intact    Vascular  Capillary refills: < 3 seconds  The left DP pulse is 2+. The left PT pulse is 2+. Assign Risk Category  No deformity present  No loss of protective sensation  No weak pulses  Risk: 0       Depression Screening and Follow-up Plan: Patient was screened for depression during today's encounter.  They screened negative with a PHQ-2 score of 2. Urinary Incontinence Plan of Care: counseling topics discussed: practice Kegel (pelvic floor strengthening) exercises, use restroom every 2 hours, limit alcohol, caffeine, spicy foods, and acidic foods, limiting fluid intake 3-4 hours before bed, weight loss, preventing constipation and limiting fluid intake to 60 oz. per day. Subjective     HPI  Review of Systems   Constitutional:  Negative for activity change, appetite change, fatigue and fever. HENT:  Negative for congestion, ear pain, postnasal drip, rhinorrhea, sinus pressure, sinus pain, sneezing and sore throat. Eyes:  Negative for pain and redness. Respiratory:  Negative for apnea, cough, chest tightness, shortness of breath and wheezing. Cardiovascular:  Negative for chest pain, palpitations and leg swelling. Gastrointestinal:  Negative for abdominal pain, constipation, diarrhea, nausea and vomiting. Endocrine: Negative for cold intolerance and heat intolerance. Genitourinary:  Negative for difficulty urinating, dysuria, frequency, hematuria and urgency. Musculoskeletal:  Negative for arthralgias, back pain, gait problem and myalgias. Skin:  Negative for rash. Neurological:  Negative for dizziness, speech difficulty, weakness, numbness and headaches. Hematological:  Does not bruise/bleed easily. Psychiatric/Behavioral:  Negative for agitation, confusion and hallucinations.         Past Medical History:   Diagnosis Date   • Anxiety disorder    • Atrial fibrillation ([address])    • Cardiac arrhythmia    • Cardiomyopathy ([address])    • Colitis    • Diabetes mellitus ([address])    • Dry eyes    • Dry mouth    • HTN (hypertension)    • Hyperlipidemia    • IBS (irritable bowel syndrome)    • MVP (mitral valve prolapse)    • Osteoporosis    • SOB (shortness of breath)    • Tachycardia      Past Surgical History:   Procedure Laterality Date   • CATARACT EXTRACTION Bilateral    • DILATION AND CURETTAGE OF UTERUS      x2   • WRIST SURGERY Family History   Problem Relation Age of Onset   • Heart attack Mother    • Diabetes Mother    • Heart attack Father    • Colon cancer Sister         unknown age   • Stomach cancer Sister    • Stomach cancer Sister 54   • No Known Problems Sister    • No Known Problems Sister    • No Known Problems Sister    • No Known Problems Maternal Grandmother    • No Known Problems Paternal Grandmother    • No Known Problems Maternal Aunt    • No Known Problems Paternal Aunt    • No Known Problems Paternal Aunt    • Breast cancer Neg Hx      Social History     Socioeconomic History   • Marital status: /Civil Union     Spouse name: None   • Number of children: None   • Years of education: None   • Highest education level: None   Occupational History   • Occupation: Retired   Tobacco Use   • Smoking status: Never   • Smokeless tobacco: Never   Vaping Use   • Vaping Use: Never used   Substance and Sexual Activity   • Alcohol use: No   • Drug use: Never   • Sexual activity: Not Currently   Other Topics Concern   • None   Social History Narrative    · Most recent tobacco use screenin2019      · Do you currently or have you served in the [address] [place] [address] [employer]:   No      · Were you activated, into active duty, as a member of the [employer] or as a Reservist:   No      · Sexual orientation:   Heterosexual      · Diet:   Diabetic       · Caffeine intake:   Occasional      · Seat belts used routinely:   Yes      · Sunscreen used routinely:   No      · Smoke alarm in home: Yes      · Advance directive:    Yes      · Salt Intake:   not much      Social Determinants of Health     Financial Resource Strain: Not on file   Food Insecurity: Not on file   Transportation Needs: Not on file   Physical Activity: Not on file   Stress: Not on file   Social Connections: Not on file   Intimate Partner Violence: Not on file   Housing Stability: Not on file     Current Outpatient Medications on File Prior to Visit   Medication Sig • Cholecalciferol (VITAMIN D3 PO) Vitamin D3   • cyclobenzaprine (FLEXERIL) 10 mg tablet TAKE 1 TABLET BY MOUTH THREE TIMES A DAY AS NEEDED FOR MUSCLE SPASMS   • denosumab (PROLIA) 60 mg/mL Inject 60 mg under the skin every 6 (six) months   • dicyclomine (BENTYL) 10 mg capsule TAKE 1 CAPSULE (10 MG TOTAL) BY MOUTH EVERY 6 (SIX) HOURS AS NEEDED (NAUSEA)   • digoxin (LANOXIN) 0.125 mg tablet TAKE 1 TABLET BY MOUTH EVERY DAY   • Eliquis 5 MG TAKE 1 TABLET BY MOUTH TWICE A DAY   • ezetimibe-simvastatin (VYTORIN) 10-20 mg per tablet TAKE 1 TABLET BY MOUTH EVERYDAY AT BEDTIME   • Januvia 100 MG tablet TAKE 1 TABLET BY MOUTH EVERY DAY   • levocetirizine (XYZAL) 5 MG tablet TAKE 1 TABLET BY MOUTH EVERY DAY IN THE EVENING   • levothyroxine 88 mcg tablet TAKE 1 TABLET BY MOUTH EVERY DAY   • lisinopril (ZESTRIL) 2.5 mg tablet Take 1 tablet (2.5 mg total) by mouth daily   • metFORMIN (GLUCOPHAGE) 500 mg tablet TAKE 2 TABLETS BY MOUTH TWICE A DAY   • metoprolol tartrate (LOPRESSOR) 50 mg tablet Take 1 tablet (50 mg total) by mouth every 12 (twelve) hours   • Multiple Vitamins-Minerals (CENTRUM SILVER PO) Take 1 tablet by mouth daily   • ondansetron (ZOFRAN) 8 mg tablet TAKE 0.5 TABLETS (4 MG TOTAL) BY MOUTH EVERY 8 (EIGHT) HOURS AS NEEDED FOR NAUSEA OR VOMITING   • oxybutynin (DITROPAN-XL) 10 MG 24 hr tablet TAKE 2 TABLETS (20 MG TOTAL) BY MOUTH DAILY AT BEDTIME   • pantoprazole (PROTONIX) 40 mg tablet TAKE 1 TABLET BY MOUTH TWICE A DAY   • senna (SENOKOT) 8.6 MG tablet Take 1 tablet by mouth daily   • spironolactone (ALDACTONE) 25 mg tablet TAKE 1 TABLET BY MOUTH EVERY DAY     Allergies   Allergen Reactions   • Butoconazole    • Moxifloxacin    • Neomycin-Bacitracin Zn-Polymyx    • Smz-Tmp Ds [Sulfamethoxazole-Trimethoprim]    • Sulfa Antibiotics    • Telithromycin Diarrhea     Immunization History   Administered Date(s) Administered   • COVID-19 MODERNA VACC 0.25 ML IM BOOSTER 12/15/2021   • COVID-19 MODERNA VACC 0.5 ML IM 04/07/2021, 05/05/2021, 12/15/2021       Objective     /76 (BP Location: Left arm, Patient Position: Sitting, Cuff Size: Standard)   Pulse 84   Temp (!) 97.2 °F (36.2 °C) (Temporal)   Resp 16   Ht 5' 4" (1.626 m)   Wt 50.3 kg (111 lb)   SpO2 99%   BMI 19.05 kg/m²     Physical Exam  Vitals and nursing note reviewed. Constitutional:       Appearance: She is well-developed. HENT:      Head: Normocephalic and atraumatic. Nose: Nose normal.      Mouth/Throat:      Mouth: Mucous membranes are moist.   Eyes:      General: No scleral icterus. Conjunctiva/sclera: Conjunctivae normal.      Pupils: Pupils are equal, round, and reactive to light. Neck:      Thyroid: No thyromegaly. Cardiovascular:      Rate and Rhythm: Normal rate and regular rhythm. Pulses: no weak pulses          Dorsalis pedis pulses are 2+ on the right side and 2+ on the left side. Posterior tibial pulses are 2+ on the right side and 2+ on the left side. Pulmonary:      [place]: Pulmonary effort is normal.      Breath sounds: Normal breath sounds. No wheezing. Abdominal:      General: Bowel sounds are normal. There is no distension. Palpations: Abdomen is soft. Tenderness: There is no abdominal tenderness. There is no guarding or rebound. Musculoskeletal:         General: No tenderness or deformity. Normal range of motion. Cervical back: Normal range of motion and neck supple. Feet:      Right foot:      Skin integrity: No ulcer, skin breakdown, erythema, warmth, callus or dry skin. Left foot:      Skin integrity: No ulcer, skin breakdown, erythema, warmth, callus or dry skin. Skin:     General: Skin is warm and dry. Findings: No erythema or rash. Neurological:      Mental Status: She is alert and oriented to person, place, and time. Sensory: No sensory deficit.    Psychiatric:         Mood and Affect: Mood normal.         Behavior: Behavior normal.         Thought Content: Thought content normal.         Judgment: Judgment normal.       Chuy Peres MD

## 2023-10-24 ENCOUNTER — OFFICE VISIT (OUTPATIENT)
Dept: FAMILY MEDICINE CLINIC | Facility: CLINIC | Age: 78
End: 2023-10-24
Payer: MEDICARE

## 2023-10-24 VITALS
OXYGEN SATURATION: 99 % | HEIGHT: 64 IN | TEMPERATURE: 97.7 F | WEIGHT: 110 LBS | HEART RATE: 110 BPM | BODY MASS INDEX: 18.78 KG/M2 | RESPIRATION RATE: 14 BRPM

## 2023-10-24 DIAGNOSIS — J01.10 SUBACUTE FRONTAL SINUSITIS: Primary | ICD-10-CM

## 2023-10-24 PROCEDURE — 99213 OFFICE O/P EST LOW 20 MIN: CPT | Performed by: NURSE PRACTITIONER

## 2023-10-24 RX ORDER — BENZONATATE 100 MG/1
100 CAPSULE ORAL 3 TIMES DAILY PRN
Qty: 60 CAPSULE | Refills: 0 | Status: SHIPPED | OUTPATIENT
Start: 2023-10-24

## 2023-10-24 RX ORDER — AMOXICILLIN 500 MG/1
500 TABLET, FILM COATED ORAL 2 TIMES DAILY
Qty: 14 TABLET | Refills: 0 | Status: SHIPPED | OUTPATIENT
Start: 2023-10-24 | End: 2023-10-31

## 2023-10-24 NOTE — PROGRESS NOTES
Assessment/Plan:      Diagnoses and all orders for this visit:    Subacute frontal sinusitis  -     benzonatate (TESSALON PERLES) 100 mg capsule; Take 1 capsule (100 mg total) by mouth 3 (three) times a day as needed for cough  -     amoxicillin (AMOXIL) 500 MG tablet; Take 1 tablet (500 mg total) by mouth 2 (two) times a day for 9days    27-year-old female with a complaint of URI symptoms. Most likely viral however due to patient's age and comorbidities will give her amoxicillin to be started Thursday if no better. Tessalon Perles to be taken 3 times daily for cough as needed. Fails to improve or significantly worsens return to office for follow-up. Dosing all possible side effects of the prescribed medications or medications that had been prescribed in the past were reviewed and all questions were answered. Patient verbalized agreement and understanding of the plan of care as outlined during the office visit today return to office as indicated or sooner if a problem arises. Subjective:     Patient ID: Vita Kline is a 69 y.o. female. Patient is here with a complaint of upper respiratory tract discomfort has had a cough runny nose and some nasal congestion. Denies any need nausea vomiting diarrhea chest pains or shortness of breath. All over-the-counter medication attempted arm were unsuccessful. Review of Systems   Constitutional:  Negative for chills. HENT:  Positive for congestion, rhinorrhea and sore throat. Respiratory:  Positive for cough. Objective:     Physical Exam  Constitutional:       General: She is not in acute distress. Appearance: She is not diaphoretic. HENT:      Head: Atraumatic. Right Ear: Tympanic membrane normal.      Left Ear: Tympanic membrane normal.      Nose: Mucosal edema, congestion and rhinorrhea present. Right Sinus: Frontal sinus tenderness present. Left Sinus: Frontal sinus tenderness present. Cardiovascular:      Rate and Rhythm: Normal rate and regular rhythm. Heart sounds: Normal heart sounds. Pulmonary:      [place]: Pulmonary effort is normal.      Breath sounds: Normal breath sounds. Musculoskeletal:         General: Normal range of motion. Cervical back: Normal range of motion.

## 2023-10-25 ENCOUNTER — TELEPHONE (OUTPATIENT)
Age: 78
End: 2023-10-25

## 2023-10-25 DIAGNOSIS — B37.9 YEAST INFECTION: Primary | ICD-10-CM

## 2023-10-25 RX ORDER — FLUCONAZOLE 150 MG/1
150 TABLET ORAL ONCE
Qty: 1 TABLET | Refills: 1 | Status: SHIPPED | OUTPATIENT
Start: 2023-10-25 | End: 2023-10-25

## 2023-10-25 NOTE — TELEPHONE ENCOUNTER
Pt calling back to see if Chiki Hammond was able to review if she can take Diflucan with her antibiotic. Please review and give her a call back.

## 2023-10-26 ENCOUNTER — TELEPHONE (OUTPATIENT)
Dept: CARDIOLOGY CLINIC | Facility: CLINIC | Age: 78
End: 2023-10-26

## 2023-10-26 NOTE — TELEPHONE ENCOUNTER
Please let patient know that these medications should not impact the cardiac testing, however, if patient is still feeling sick early next week, then she should reschedule. Thank you.

## 2023-10-26 NOTE — TELEPHONE ENCOUNTER
Patient is on Amoxicillin and Benzonatate and is scheduled for an echo and stress test November 1st.  She would like to know since she will be on this for seven days will this have any effect with her stress test and echo.     [phone] or [phone]

## 2023-11-16 ENCOUNTER — TELEPHONE (OUTPATIENT)
Age: 78
End: 2023-11-16

## 2023-11-16 ENCOUNTER — OFFICE VISIT (OUTPATIENT)
Dept: FAMILY MEDICINE CLINIC | Facility: CLINIC | Age: 78
End: 2023-11-16
Payer: MEDICARE

## 2023-11-16 VITALS
HEART RATE: 108 BPM | HEIGHT: 64 IN | WEIGHT: 109 LBS | DIASTOLIC BLOOD PRESSURE: 64 MMHG | SYSTOLIC BLOOD PRESSURE: 100 MMHG | BODY MASS INDEX: 18.61 KG/M2 | RESPIRATION RATE: 16 BRPM | TEMPERATURE: 97.7 F | OXYGEN SATURATION: 99 %

## 2023-11-16 DIAGNOSIS — J34.89 THICK NASAL MUCUS: ICD-10-CM

## 2023-11-16 DIAGNOSIS — K59.09 OTHER CONSTIPATION: ICD-10-CM

## 2023-11-16 DIAGNOSIS — E86.0 DEHYDRATION: Primary | ICD-10-CM

## 2023-11-16 PROCEDURE — 99213 OFFICE O/P EST LOW 20 MIN: CPT | Performed by: NURSE PRACTITIONER

## 2023-11-16 RX ORDER — DEXTROMETHORPHAN HBR. AND GUAIFENESIN 10; 100 MG/5ML; MG/5ML
5 SOLUTION ORAL EVERY 12 HOURS
Qty: 180 ML | Refills: 0 | Status: SHIPPED | OUTPATIENT
Start: 2023-11-16

## 2023-11-16 NOTE — PROGRESS NOTES
Assessment/Plan:      Diagnoses and all orders for this visit:    Dehydration    Thick nasal mucus  -     dextromethorphan-guaiFENesin (ROBITUSSIN-DM)  mg/5 mL oral liquid; Take 5 mL by mouth every 12 (twelve) hours    Other constipation        Assessment does not indicate a sinus infection or upper respiratory. Patient has severe xerostomia does indicate that she does not drink water she does not like the taste of water "nauseates her."  Did advise that based on her most recent labs dehydration as she is damaging her kidneys with this is also demonstrated by her rapid heart rate oral xerostomia and nasal xerostomia and very very dry tympanic membranes. Patient's skin is dry lips are chapped she is strongly urged to hydrate eight 8 ounce servings of water at a minimum. Patient verbalized understanding of this patient did feel strongly that she does have a sinus infection based upon the thick mucus however I did reiterate that this is related to her lack of moisture increase moisture will cause her mucous membranes to thin which she can expectorate easier. Return to office as needed or as indicated. Subjective:     Patient ID: Jovanny Castillo is a 69 y.o. female. Patient is being seen today for follow-up. She was recently treated for an upper respiratory given amoxicillin which gave her a yeast infection treated with Diflucan resolved bentonite for the cough. Seen today for thick mucus and a slight cough with that. She denies any other related symptoms. Cough  Associated symptoms include postnasal drip. Review of Systems   HENT:  Positive for congestion and postnasal drip. Respiratory:  Positive for cough. Objective:     Physical Exam  HENT:      Ears:      Comments: Tm in tact bl severe dryness of cannel      Mouth/Throat:      Comments: Severe xerostomia  Cardiovascular:      Rate and Rhythm: Tachycardia present. Rhythm irregular. Heart sounds: Normal heart sounds. Pulmonary:      Breath sounds: Normal breath sounds. Skin:     Comments: Very dry     Neurological:      Mental Status: She is alert. Mental status is at baseline.

## 2023-11-16 NOTE — TELEPHONE ENCOUNTER
Pt called wanting to know should she continue taking Claritin while taking dextromethorphan-guaiFENesin ?  Pt asked if she can be called at [phone]

## 2023-11-16 NOTE — TELEPHONE ENCOUNTER
Patient called to follow up on whether or not she could take the claritin, warm transferred to the office where they checked with provider and told me that as long as the patient was taking regular claritin and not claritin d she could continue to take it. I checked with the patient and she checked the box while I was on the phone and stated it was not claritin d. Patient will continue to take claritin.

## 2023-11-21 ENCOUNTER — TELEPHONE (OUTPATIENT)
Dept: FAMILY MEDICINE CLINIC | Facility: CLINIC | Age: 78
End: 2023-11-21

## 2023-11-21 DIAGNOSIS — J31.2 CHRONIC SORE THROAT: Primary | ICD-10-CM

## 2023-11-21 NOTE — TELEPHONE ENCOUNTER
Patient requesting a call back from [address] [place] [address] in regards to discussing her medications.

## 2023-11-21 NOTE — TELEPHONE ENCOUNTER
Patient called, she said she would like you to order lab work for her, she said she will go tmrw morning to get it done.

## 2023-11-22 ENCOUNTER — APPOINTMENT (OUTPATIENT)
Dept: LAB | Facility: CLINIC | Age: 78
End: 2023-11-22
Payer: MEDICARE

## 2023-11-22 DIAGNOSIS — E11.51 TYPE 2 DIABETES MELLITUS WITH DIABETIC PERIPHERAL ANGIOPATHY WITHOUT GANGRENE, WITHOUT LONG-TERM CURRENT USE OF INSULIN (HCC): ICD-10-CM

## 2023-11-22 DIAGNOSIS — J31.2 CHRONIC SORE THROAT: ICD-10-CM

## 2023-11-22 LAB
ALBUMIN SERPL BCP-MCNC: 4.4 G/DL (ref 3.5–5)
ALP SERPL-CCNC: 99 U/L (ref 34–104)
ALT SERPL W P-5'-P-CCNC: 7 U/L (ref 7–52)
ANION GAP SERPL CALCULATED.3IONS-SCNC: 14 MMOL/L
AST SERPL W P-5'-P-CCNC: 15 U/L (ref 13–39)
BASOPHILS # BLD AUTO: 0.05 THOUSANDS/ÂΜL (ref 0–0.1)
BASOPHILS NFR BLD AUTO: 1 % (ref 0–1)
BILIRUB SERPL-MCNC: 0.51 MG/DL (ref 0.2–1)
BUN SERPL-MCNC: 24 MG/DL (ref 5–25)
CALCIUM SERPL-MCNC: 9.4 MG/DL (ref 8.4–10.2)
CHLORIDE SERPL-SCNC: 98 MMOL/L (ref 96–108)
CO2 SERPL-SCNC: 26 MMOL/L (ref 21–32)
CREAT SERPL-MCNC: 1.08 MG/DL (ref 0.6–1.3)
EOSINOPHIL # BLD AUTO: 0.33 THOUSAND/ÂΜL (ref 0–0.61)
EOSINOPHIL NFR BLD AUTO: 4 % (ref 0–6)
ERYTHROCYTE [DISTWIDTH] IN BLOOD BY AUTOMATED COUNT: 12.1 % (ref 11.6–15.1)
EST. AVERAGE GLUCOSE BLD GHB EST-MCNC: 209 MG/DL
GFR SERPL CREATININE-BSD FRML MDRD: 49 ML/MIN/1.73SQ M
GLUCOSE P FAST SERPL-MCNC: 220 MG/DL (ref 65–99)
HBA1C MFR BLD: 8.9 %
HCT VFR BLD AUTO: 40.2 % (ref 34.8–46.1)
HGB BLD-MCNC: 13.6 G/DL (ref 11.5–15.4)
IMM GRANULOCYTES # BLD AUTO: 0.04 THOUSAND/UL (ref 0–0.2)
IMM GRANULOCYTES NFR BLD AUTO: 0 % (ref 0–2)
LYMPHOCYTES # BLD AUTO: 2.1 THOUSANDS/ÂΜL (ref 0.6–4.47)
LYMPHOCYTES NFR BLD AUTO: 23 % (ref 14–44)
MCH RBC QN AUTO: 32.1 PG (ref 26.8–34.3)
MCHC RBC AUTO-ENTMCNC: 33.8 G/DL (ref 31.4–37.4)
MCV RBC AUTO: 95 FL (ref 82–98)
MONOCYTES # BLD AUTO: 0.8 THOUSAND/ÂΜL (ref 0.17–1.22)
MONOCYTES NFR BLD AUTO: 9 % (ref 4–12)
NEUTROPHILS # BLD AUTO: 5.87 THOUSANDS/ÂΜL (ref 1.85–7.62)
NEUTS SEG NFR BLD AUTO: 63 % (ref 43–75)
NRBC BLD AUTO-RTO: 0 /100 WBCS
PLATELET # BLD AUTO: 242 THOUSANDS/UL (ref 149–390)
PMV BLD AUTO: 10.6 FL (ref 8.9–12.7)
POTASSIUM SERPL-SCNC: 4.2 MMOL/L (ref 3.5–5.3)
PROT SERPL-MCNC: 7.9 G/DL (ref 6.4–8.4)
RBC # BLD AUTO: 4.24 MILLION/UL (ref 3.81–5.12)
SODIUM SERPL-SCNC: 138 MMOL/L (ref 135–147)
WBC # BLD AUTO: 9.19 THOUSAND/UL (ref 4.31–10.16)

## 2023-11-22 PROCEDURE — 85025 COMPLETE CBC W/AUTO DIFF WBC: CPT

## 2023-11-22 PROCEDURE — 80053 COMPREHEN METABOLIC PANEL: CPT

## 2023-11-22 PROCEDURE — 83036 HEMOGLOBIN GLYCOSYLATED A1C: CPT

## 2023-11-22 PROCEDURE — 36415 COLL VENOUS BLD VENIPUNCTURE: CPT

## 2023-11-27 DIAGNOSIS — E11.9 TYPE 2 DIABETES MELLITUS WITHOUT COMPLICATION, WITHOUT LONG-TERM CURRENT USE OF INSULIN (HCC): ICD-10-CM

## 2023-11-27 DIAGNOSIS — E03.9 HYPOTHYROIDISM, UNSPECIFIED TYPE: ICD-10-CM

## 2023-11-27 DIAGNOSIS — E03.9 HYPOTHYROIDISM, UNSPECIFIED TYPE: Primary | ICD-10-CM

## 2023-11-27 RX ORDER — LEVOTHYROXINE SODIUM 0.07 MG/1
75 TABLET ORAL
Qty: 90 TABLET | Refills: 1 | Status: SHIPPED | OUTPATIENT
Start: 2023-11-27

## 2023-11-27 RX ORDER — SITAGLIPTIN 100 MG/1
100 TABLET, FILM COATED ORAL DAILY
Qty: 90 TABLET | Refills: 1 | Status: SHIPPED | OUTPATIENT
Start: 2023-11-27 | End: 2024-02-25

## 2023-11-27 RX ORDER — LEVOTHYROXINE SODIUM 88 UG/1
88 TABLET ORAL DAILY
Qty: 90 TABLET | Refills: 3 | Status: SHIPPED | OUTPATIENT
Start: 2023-11-27 | End: 2023-11-27

## 2023-11-27 RX ORDER — SITAGLIPTIN 100 MG/1
100 TABLET, FILM COATED ORAL DAILY
Qty: 90 TABLET | Refills: 0 | Status: SHIPPED | OUTPATIENT
Start: 2023-11-27 | End: 2023-11-27 | Stop reason: SDUPTHER

## 2023-11-27 NOTE — TELEPHONE ENCOUNTER
Patient called and would like a 90 day supply of her Levothyroxine and Januvia sent to [facility] in [place].

## 2023-11-28 ENCOUNTER — TELEPHONE (OUTPATIENT)
Age: 78
End: 2023-11-28

## 2023-11-28 NOTE — TELEPHONE ENCOUNTER
Layton Barroso did call back to RS her appt, Dr. Eduar Garcia doesn't have any availability until end of January. She was seeing Austen Del Angel for her A1C, if someone can please reach out and let her know the results as well as if she should come in sooner.  Please advise

## 2023-11-28 NOTE — TELEPHONE ENCOUNTER
I can refill her meds but she needs an OV to discuss her lab results. It can wait till 12/1.  I will also need to decrease her synthroid to 75mcg for the dose of 88 was too strong

## 2023-11-28 NOTE — TELEPHONE ENCOUNTER
Tried to call patient and review message and reschedule appointment for 12/1 and voice mail box was full.

## 2023-12-04 ENCOUNTER — TELEPHONE (OUTPATIENT)
Age: 78
End: 2023-12-04

## 2023-12-04 NOTE — TELEPHONE ENCOUNTER
PT informed and acknwlegded of message below. ADA Remy  11/28/2023 11:00 AM EST       Her lab indicates there is no infection the white blood cell count is normal however her A1c is almost 9 that is a problem that needs to discuss her next follow-up appointment. Also, PT said she picked up the levothyroxine refill at [facility], and was given 88mcg, instead of 75 mcg, as on her med list  I told her to call [facility] to recheck their file, which she voiced understanding.

## 2023-12-07 NOTE — TELEPHONE ENCOUNTER
Left a message with her son Ghislaine Durham that we cannot reach his mom to reason to give her the results of her lab results. I have tried reaching her by her cell phone which states I have to add a mailbox number which I do not know what it is and so I cannot leave a message and I call her house number it says it is not excepting any messages.

## 2023-12-08 ENCOUNTER — TELEPHONE (OUTPATIENT)
Age: 78
End: 2023-12-08

## 2023-12-08 NOTE — TELEPHONE ENCOUNTER
Triage RN attempted to make a call to the patient in regards of the lab results. No answer. Unable to leave a message due to mailbox is full.

## 2023-12-08 NOTE — TELEPHONE ENCOUNTER
Pt called in returning missed call from Dr. River Sotelo. States she will call back again in the morning when the office is open.

## 2023-12-08 NOTE — TELEPHONE ENCOUNTER
1.  Transplant Doing well after transplant. Continue topical steroids. PRED 2x/d. Rejection and vaccinations reviewed. 2. Pseudophakia OS - IOL stable. Monitor. 3. Combined Types of Cataract OD: Explained how cataracts can effect vision. Recommend clinical observation. The patient was advised to contact us if any change or worsening of vision. 4. Exudative Macular Degeneration OS - Treatment options were discussed including anti-VEGF injections. The importance of followup visits with retina and continued treament was discussed. 5.  Return for an appointment in 1 month for dilated fundus exam. ARx MRx and Topography. with Dr. Phoebe Shah. Patient advised of lab results. Patient aware and understands.

## 2023-12-16 DIAGNOSIS — M62.838 MUSCLE SPASM: ICD-10-CM

## 2023-12-18 RX ORDER — CYCLOBENZAPRINE HCL 10 MG
TABLET ORAL
Qty: 270 TABLET | Refills: 1 | Status: SHIPPED | OUTPATIENT
Start: 2023-12-18

## 2024-01-18 DIAGNOSIS — J30.1 ALLERGIC RHINITIS DUE TO POLLEN, UNSPECIFIED SEASONALITY: ICD-10-CM

## 2024-01-18 RX ORDER — LEVOCETIRIZINE DIHYDROCHLORIDE 5 MG/1
TABLET, FILM COATED ORAL
Qty: 90 TABLET | Refills: 1 | Status: SHIPPED | OUTPATIENT
Start: 2024-01-18

## 2024-01-19 ENCOUNTER — TELEPHONE (OUTPATIENT)
Age: 79
End: 2024-01-19

## 2024-01-19 NOTE — TELEPHONE ENCOUNTER
Phone call to patient and she stated that she never started the 75 mcg levothyroxine. Per verbal from Dr Vasquez patient will start 75 mcg tomorrow and have blood work done before appointment on 01/24/2024.

## 2024-01-19 NOTE — TELEPHONE ENCOUNTER
Patient has questions about her levothyroxine medication prior to her appointment on 1/24. Please call patient back to discuss.

## 2024-01-23 ENCOUNTER — RA CDI HCC (OUTPATIENT)
Dept: OTHER | Facility: HOSPITAL | Age: 79
End: 2024-01-23

## 2024-01-24 ENCOUNTER — OFFICE VISIT (OUTPATIENT)
Dept: FAMILY MEDICINE CLINIC | Facility: CLINIC | Age: 79
End: 2024-01-24
Payer: MEDICARE

## 2024-01-24 VITALS
TEMPERATURE: 97 F | BODY MASS INDEX: 2.39 KG/M2 | WEIGHT: 14 LBS | HEIGHT: 64 IN | SYSTOLIC BLOOD PRESSURE: 108 MMHG | RESPIRATION RATE: 16 BRPM | DIASTOLIC BLOOD PRESSURE: 70 MMHG | HEART RATE: 86 BPM | OXYGEN SATURATION: 97 %

## 2024-01-24 DIAGNOSIS — E46 PROTEIN-CALORIE MALNUTRITION, UNSPECIFIED SEVERITY (HCC): ICD-10-CM

## 2024-01-24 DIAGNOSIS — I10 PRIMARY HYPERTENSION: ICD-10-CM

## 2024-01-24 DIAGNOSIS — I42.9 CARDIOMYOPATHY, UNSPECIFIED TYPE (HCC): ICD-10-CM

## 2024-01-24 DIAGNOSIS — E78.2 MIXED HYPERLIPIDEMIA: ICD-10-CM

## 2024-01-24 DIAGNOSIS — E03.4 HYPOTHYROIDISM DUE TO ACQUIRED ATROPHY OF THYROID: ICD-10-CM

## 2024-01-24 DIAGNOSIS — M81.0 AGE-RELATED OSTEOPOROSIS WITHOUT CURRENT PATHOLOGICAL FRACTURE: ICD-10-CM

## 2024-01-24 DIAGNOSIS — Z00.00 WELL ADULT EXAM: Primary | ICD-10-CM

## 2024-01-24 DIAGNOSIS — E11.51 TYPE 2 DIABETES MELLITUS WITH DIABETIC PERIPHERAL ANGIOPATHY WITHOUT GANGRENE, WITHOUT LONG-TERM CURRENT USE OF INSULIN (HCC): ICD-10-CM

## 2024-01-24 DIAGNOSIS — I48.91 ATRIAL FIBRILLATION, UNSPECIFIED TYPE (HCC): ICD-10-CM

## 2024-01-24 DIAGNOSIS — N18.31 STAGE 3A CHRONIC KIDNEY DISEASE (HCC): ICD-10-CM

## 2024-01-24 DIAGNOSIS — F41.9 ANXIETY: ICD-10-CM

## 2024-01-24 PROCEDURE — 99214 OFFICE O/P EST MOD 30 MIN: CPT | Performed by: FAMILY MEDICINE

## 2024-01-24 PROCEDURE — G0439 PPPS, SUBSEQ VISIT: HCPCS | Performed by: FAMILY MEDICINE

## 2024-01-24 NOTE — PATIENT INSTRUCTIONS
Medicare Preventive Visit Patient Instructions  Thank you for completing your Welcome to Medicare Visit or Medicare Annual Wellness Visit today. Your next wellness visit will be due in one year (1/24/2025).  The screening/preventive services that you may require over the next 5-10 years are detailed below. Some tests may not apply to you based off risk factors and/or age. Screening tests ordered at today's visit but not completed yet may show as past due. Also, please note that scanned in results may not display below.  Preventive Screenings:  Service Recommendations Previous Testing/Comments   Colorectal Cancer Screening  * Colonoscopy    * Fecal Occult Blood Test (FOBT)/Fecal Immunochemical Test (FIT)  * Fecal DNA/Cologuard Test  * Flexible Sigmoidoscopy Age: 45-75 years old   Colonoscopy: every 10 years (may be performed more frequently if at higher risk)  OR  FOBT/FIT: every 1 year  OR  Cologuard: every 3 years  OR  Sigmoidoscopy: every 5 years  Screening may be recommended earlier than age 45 if at higher risk for colorectal cancer. Also, an individualized decision between you and your healthcare provider will decide whether screening between the ages of 76-85 would be appropriate. Colonoscopy: Not on file  FOBT/FIT: Not on file  Cologuard: Not on file  Sigmoidoscopy: Not on file          Breast Cancer Screening Age: 40+ years old  Frequency: every 1-2 years  Not required if history of left and right mastectomy Mammogram: 06/21/2023    Screening Current   Cervical Cancer Screening Between the ages of 21-29, pap smear recommended once every 3 years.   Between the ages of 30-65, can perform pap smear with HPV co-testing every 5 years.   Recommendations may differ for women with a history of total hysterectomy, cervical cancer, or abnormal pap smears in past. Pap Smear: Not on file    Screening Not Indicated   Hepatitis C Screening Once for adults born between 1945 and 1965  More frequently in patients at high  risk for Hepatitis C Hep C Antibody: 01/14/2022    Screening Current   Diabetes Screening 1-2 times per year if you're at risk for diabetes or have pre-diabetes Fasting glucose: 220 mg/dL (11/22/2023)  A1C: 8.9 % (11/22/2023)  Screening Not Indicated  History Diabetes   Cholesterol Screening Once every 5 years if you don't have a lipid disorder. May order more often based on risk factors. Lipid panel: 10/13/2023    Screening Not Indicated  History Lipid Disorder     Other Preventive Screenings Covered by Medicare:  Abdominal Aortic Aneurysm (AAA) Screening: covered once if your at risk. You're considered to be at risk if you have a family history of AAA.  Lung Cancer Screening: covers low dose CT scan once per year if you meet all of the following conditions: (1) Age 55-77; (2) No signs or symptoms of lung cancer; (3) Current smoker or have quit smoking within the last 15 years; (4) You have a tobacco smoking history of at least 20 pack years (packs per day multiplied by number of years you smoked); (5) You get a written order from a healthcare provider.  Glaucoma Screening: covered annually if you're considered high risk: (1) You have diabetes OR (2) Family history of glaucoma OR (3)  aged 50 and older OR (4)  American aged 65 and older  Osteoporosis Screening: covered every 2 years if you meet one of the following conditions: (1) You're estrogen deficient and at risk for osteoporosis based off medical history and other findings; (2) Have a vertebral abnormality; (3) On glucocorticoid therapy for more than 3 months; (4) Have primary hyperparathyroidism; (5) On osteoporosis medications and need to assess response to drug therapy.   Last bone density test (DXA Scan): 06/21/2023.  HIV Screening: covered annually if you're between the age of 15-65. Also covered annually if you are younger than 15 and older than 65 with risk factors for HIV infection. For pregnant patients, it is covered up to 3  times per pregnancy.    Immunizations:  Immunization Recommendations   Influenza Vaccine Annual influenza vaccination during flu season is recommended for all persons aged >= 6 months who do not have contraindications   Pneumococcal Vaccine   * Pneumococcal conjugate vaccine = PCV13 (Prevnar 13), PCV15 (Vaxneuvance), PCV20 (Prevnar 20)  * Pneumococcal polysaccharide vaccine = PPSV23 (Pneumovax) Adults 19-65 yo with certain risk factors or if 65+ yo  If never received any pneumonia vaccine: recommend Prevnar 20 (PCV20)  Give PCV20 if previously received 1 dose of PCV13 or PPSV23   Hepatitis B Vaccine 3 dose series if at intermediate or high risk (ex: diabetes, end stage renal disease, liver disease)   Respiratory syncytial virus (RSV) Vaccine - COVERED BY MEDICARE PART D  * RSVPreF3 (Arexvy) CDC recommends that adults 60 years of age and older may receive a single dose of RSV vaccine using shared clinical decision-making (SCDM)   Tetanus (Td) Vaccine - COST NOT COVERED BY MEDICARE PART B Following completion of primary series, a booster dose should be given every 10 years to maintain immunity against tetanus. Td may also be given as tetanus wound prophylaxis.   Tdap Vaccine - COST NOT COVERED BY MEDICARE PART B Recommended at least once for all adults. For pregnant patients, recommended with each pregnancy.   Shingles Vaccine (Shingrix) - COST NOT COVERED BY MEDICARE PART B  2 shot series recommended in those 19 years and older who have or will have weakened immune systems or those 50 years and older     Health Maintenance Due:      Topic Date Due   • Breast Cancer Screening: Mammogram  06/21/2024   • Hepatitis C Screening  Completed     Immunizations Due:      Topic Date Due   • Pneumococcal Vaccine: 65+ Years (1 - PCV) Never done   • Influenza Vaccine (1) Never done   • COVID-19 Vaccine (5 - 2023-24 season) 09/01/2023     Advance Directives   What are advance directives?  Advance directives are legal documents that  state your wishes and plans for medical care. These plans are made ahead of time in case you lose your ability to make decisions for yourself. Advance directives can apply to any medical decision, such as the treatments you want, and if you want to donate organs.   What are the types of advance directives?  There are many types of advance directives, and each state has rules about how to use them. You may choose a combination of any of the following:  Living will:  This is a written record of the treatment you want. You can also choose which treatments you do not want, which to limit, and which to stop at a certain time. This includes surgery, medicine, IV fluid, and tube feedings.   Durable power of  for healthcare (DPAHC):  This is a written record that states who you want to make healthcare choices for you when you are unable to make them for yourself. This person, called a proxy, is usually a family member or a friend. You may choose more than 1 proxy.  Do not resuscitate (DNR) order:  A DNR order is used in case your heart stops beating or you stop breathing. It is a request not to have certain forms of treatment, such as CPR. A DNR order may be included in other types of advance directives.  Medical directive:  This covers the care that you want if you are in a coma, near death, or unable to make decisions for yourself. You can list the treatments you want for each condition. Treatment may include pain medicine, surgery, blood transfusions, dialysis, IV or tube feedings, and a ventilator (breathing machine).  Values history:  This document has questions about your views, beliefs, and how you feel and think about life. This information can help others choose the care that you would choose.  Why are advance directives important?  An advance directive helps you control your care. Although spoken wishes may be used, it is better to have your wishes written down. Spoken wishes can be misunderstood, or not  followed. Treatments may be given even if you do not want them. An advance directive may make it easier for your family to make difficult choices about your care.       © Copyright [employer] 2018 Information is for End User's use only and may not be sold, redistributed or otherwise used for commercial purposes. All illustrations and images included in CareNotes® are the copyrighted property of [employer] or [employer]

## 2024-01-24 NOTE — ASSESSMENT & PLAN NOTE
Continue with anticogulation and rate control of heart rate. Concerns about condition were addressed today.

## 2024-01-24 NOTE — PROGRESS NOTES
Name: Hannah Marinelli      : 1945      MRN: [id number]  Encounter Provider: Arik Vasquez MD  Encounter Date: 2024   Encounter department: [facility]    Assessment & Plan     1. Well adult exam    2. Age-related osteoporosis without current pathological fracture  Assessment & Plan:  Patient to continue with weight bearing exercises as much as possible and oral intake of 1200 mg of calcium with 800 IU of Vit D to maximize bone protection. Pt  to continue  with DEXA scan every 2 years to reassess. All qustions regarding this problem answered today to patient satisfaction.       3. Anxiety  Assessment & Plan:  Patient to continue utilizing medical therapy as well as counseling sources as applicable to condition. If suicidal thought or fear of suicide attempt, to call 911 and seek help immediately. Medications and therapy reviewed today and all patient  questions answered today.       4. Atrial fibrillation, unspecified type (HCC)  Assessment & Plan:  Continue with anticogulation and rate control of heart rate. Concerns about condition were addressed today.       5. Mixed hyperlipidemia  Assessment & Plan:  Patient  is stable with current medication and we discussed a low fat low cholesterol diet. Weight loss also discussed for this will help lower cholesterol also. Recheck lipids in 6 months.       6. Primary hypertension  Assessment & Plan:  Patient is stable with current anti-hypertensive medicine and continue to follow a low sodium diet and take current medication. All questions about this condition were answered today.       7. Hypothyroidism due to acquired atrophy of thyroid  Assessment & Plan:  Patient to continue current dose of thyroid medicine and recheck TSH in 6 months     Orders:  -     TSH, 3rd generation with Free T4 reflex; Future    8. Stage 3a chronic kidney disease (HCC)  Assessment & Plan:  Lab Results   Component Value Date    EGFR 49 2023    EGFR  39 10/13/2023    EGFR 35 07/26/2023    CREATININE 1.08 11/22/2023    CREATININE 1.31 (H) 10/13/2023    CREATININE 1.42 (H) 07/26/2023   Pt to avoid NSAIDs and any IV dyes. Patient to follow up eoither with nephrology or  with us for  further  monitoring of  renal function.       9. Type 2 diabetes mellitus with diabetic peripheral angiopathy without gangrene, without long-term current use of insulin (HCC)  Assessment & Plan:  Needs better control  Lab Results   Component Value Date    HGBA1C 8.9 (H) 11/22/2023     Orders:  -     IRIS Diabetic eye exam  -     Hemoglobin A1C; Future  -     Comprehensive metabolic panel; Future    10. Protein-calorie malnutrition, unspecified severity ([facility])    11. Cardiomyopathy, unspecified type ([facility])        Falls Plan of Care: balance, strength, and gait training instructions were provided. Home safety education provided.     Urinary Incontinence Plan of Care: counseling topics discussed: practice Kegel (pelvic floor strengthening) exercises, use restroom every 2 hours, limit alcohol, caffeine, spicy foods, and acidic foods, limiting fluid intake 3-4 hours before bed, weight loss, preventing constipation and limiting fluid intake to 60 oz. per day.       Subjective     HPI  Review of Systems   Constitutional:  Negative for activity change, appetite change, fatigue and fever.   HENT:  Negative for congestion, ear pain, postnasal drip, rhinorrhea, sinus pressure, sinus pain, sneezing and sore throat.    Eyes:  Negative for pain and redness.   Respiratory:  Negative for apnea, cough, chest tightness, shortness of breath and wheezing.    Cardiovascular:  Negative for chest pain, palpitations and leg swelling.   Gastrointestinal:  Negative for abdominal pain, constipation, diarrhea, nausea and vomiting.   Endocrine: Negative for cold intolerance and heat intolerance.   Genitourinary:  Negative for difficulty urinating, dysuria, frequency, hematuria and urgency.   Musculoskeletal:   Negative for arthralgias, back pain, gait problem and myalgias.   Skin:  Negative for rash.   Neurological:  Negative for dizziness, speech difficulty, weakness, numbness and headaches.   Hematological:  Does not bruise/bleed easily.   Psychiatric/Behavioral:  Negative for agitation, confusion and hallucinations.        Past Medical History:   Diagnosis Date   • Anxiety disorder    • Atrial fibrillation (HCC)    • Cardiac arrhythmia    • Cardiomyopathy (HCC)    • Colitis    • Diabetes mellitus (HCC)    • Dry eyes    • Dry mouth    • HTN (hypertension)    • Hyperlipidemia    • IBS (irritable bowel syndrome)    • MVP (mitral valve prolapse)    • Osteoporosis    • SOB (shortness of breath)    • Tachycardia      Past Surgical History:   Procedure Laterality Date   • CATARACT EXTRACTION Bilateral    • DILATION AND CURETTAGE OF UTERUS      x2   • WRIST SURGERY       Family History   Problem Relation Age of Onset   • Heart attack Mother    • Diabetes Mother    • Heart attack Father    • Colon cancer Sister         unknown age   • Stomach cancer Sister    • Stomach cancer Sister 55   • No Known Problems Sister    • No Known Problems Sister    • No Known Problems Sister    • No Known Problems Maternal Grandmother    • No Known Problems Paternal Grandmother    • No Known Problems Maternal Aunt    • No Known Problems Paternal Aunt    • No Known Problems Paternal Aunt    • Breast cancer Neg Hx      Social History     Socioeconomic History   • Marital status: /Civil Union     Spouse name: None   • Number of children: None   • Years of education: None   • Highest education level: None   Occupational History   • Occupation: Retired   Tobacco Use   • Smoking status: Never     Passive exposure: Never   • Smokeless tobacco: Never   Vaping Use   • Vaping status: Never Used   Substance and Sexual Activity   • Alcohol use: No   • Drug use: Never   • Sexual activity: Not Currently   Other Topics Concern   • None   Social History  Narrative    · Most recent tobacco use screenin2019      · Do you currently or have you served in the [place] [employer]:   No      · Were you activated, into active duty, as a member of the [employer] or as a Reservist:   No      · Sexual orientation:   Heterosexual      · Diet:   Diabetic       · Caffeine intake:   Occasional      · Seat belts used routinely:   Yes      · Sunscreen used routinely:   No      · Smoke alarm in home:   Yes      · Advance directive:   Yes      · Salt Intake:   not much      Social Determinants of Health     Financial Resource Strain: Low Risk  (2024)    Overall Financial Resource Strain (CARDIA)    • Difficulty of Paying Living Expenses: Not hard at all   Food Insecurity: Not on file   Transportation Needs: No Transportation Needs (2024)    PRAPARE - Transportation    • Lack of Transportation (Medical): No    • Lack of Transportation (Non-Medical): No   Physical Activity: Not on file   Stress: Not on file   Social Connections: Not on file   Intimate Partner Violence: Not on file   Housing Stability: Not on file     Current Outpatient Medications on File Prior to Visit   Medication Sig   • benzonatate (TESSALON PERLES) 100 mg capsule Take 1 capsule (100 mg total) by mouth 3 (three) times a day as needed for cough   • Cholecalciferol (VITAMIN D3 PO) Vitamin D3   • cyclobenzaprine (FLEXERIL) 10 mg tablet TAKE 1 TABLET BY MOUTH THREE TIMES A DAY AS NEEDED FOR MUSCLE SPASMS   • denosumab (PROLIA) 60 mg/mL Inject 60 mg under the skin every 6 (six) months   • dextromethorphan-guaiFENesin (ROBITUSSIN-DM)  mg/5 mL oral liquid Take 5 mL by mouth every 12 (twelve) hours   • dicyclomine (BENTYL) 10 mg capsule TAKE 1 CAPSULE (10 MG TOTAL) BY MOUTH EVERY 6 (SIX) HOURS AS NEEDED (NAUSEA)   • digoxin (LANOXIN) 0.125 mg tablet TAKE 1 TABLET BY MOUTH EVERY DAY   • Eliquis 5 MG TAKE 1 TABLET BY MOUTH TWICE A DAY   • ezetimibe-simvastatin (VYTORIN) 10-20 mg per tablet TAKE 1  "TABLET BY MOUTH EVERYDAY AT BEDTIME   • Januvia 100 MG tablet Take 1 tablet (100 mg total) by mouth daily   • levocetirizine (XYZAL) 5 MG tablet TAKE 1 TABLET BY MOUTH EVERY DAY IN THE EVENING   • levothyroxine (Euthyrox) 75 mcg tablet Take 1 tablet (75 mcg total) by mouth daily in the early morning   • lisinopril (ZESTRIL) 2.5 mg tablet Take 1 tablet (2.5 mg total) by mouth daily   • metFORMIN (GLUCOPHAGE) 500 mg tablet TAKE 2 TABLETS BY MOUTH TWICE A DAY   • metoprolol tartrate (LOPRESSOR) 50 mg tablet Take 1 tablet (50 mg total) by mouth every 12 (twelve) hours   • Multiple Vitamins-Minerals (CENTRUM SILVER PO) Take 1 tablet by mouth daily   • ondansetron (ZOFRAN) 8 mg tablet TAKE 0.5 TABLETS (4 MG TOTAL) BY MOUTH EVERY 8 (EIGHT) HOURS AS NEEDED FOR NAUSEA OR VOMITING   • oxybutynin (DITROPAN-XL) 10 MG 24 hr tablet TAKE 2 TABLETS (20 MG TOTAL) BY MOUTH DAILY AT BEDTIME   • pantoprazole (PROTONIX) 40 mg tablet TAKE 1 TABLET BY MOUTH TWICE A DAY   • senna (SENOKOT) 8.6 MG tablet Take 1 tablet by mouth daily   • spironolactone (ALDACTONE) 25 mg tablet TAKE 1 TABLET BY MOUTH EVERY DAY     Allergies   Allergen Reactions   • Butoconazole    • Moxifloxacin    • Neomycin-Bacitracin Zn-Polymyx    • Smz-Tmp Ds [Sulfamethoxazole-Trimethoprim]    • Sulfa Antibiotics    • Telithromycin Diarrhea     Immunization History   Administered Date(s) Administered   • COVID-19 MODERNA VACC 0.25 ML IM BOOSTER 12/15/2021   • COVID-19 MODERNA VACC 0.5 ML IM 04/07/2021, 05/05/2021, 12/15/2021       Objective     /70 (BP Location: Left arm, Patient Position: Sitting, Cuff Size: Standard)   Pulse 86   Temp (!) 97 °F (36.1 °C)   Resp 16   Ht 5' 4\" (1.626 m)   Wt 6.35 kg (14 lb)   SpO2 97%   BMI 2.40 kg/m²     Physical Exam  Vitals and nursing note reviewed.   Constitutional:       Appearance: She is well-developed.   HENT:      Head: Normocephalic and atraumatic.      Nose: Nose normal.      Mouth/Throat:      Mouth: Mucous " membranes are moist.   Eyes:      General: No scleral icterus.     Conjunctiva/sclera: Conjunctivae normal.      Pupils: Pupils are equal, round, and reactive to light.   Neck:      Thyroid: No thyromegaly.   Cardiovascular:      Rate and Rhythm: Normal rate and regular rhythm.   Pulmonary:      Effort: Pulmonary effort is normal.      Breath sounds: Normal breath sounds. No wheezing.   Abdominal:      General: Bowel sounds are normal. There is no distension.      Palpations: Abdomen is soft.      Tenderness: There is no abdominal tenderness. There is no guarding or rebound.   Musculoskeletal:         General: No tenderness or deformity. Normal range of motion.      Cervical back: Normal range of motion and neck supple.   Skin:     General: Skin is warm and dry.      Findings: No erythema or rash.   Neurological:      Mental Status: She is alert and oriented to person, place, and time.      Sensory: No sensory deficit.   Psychiatric:         Mood and Affect: Mood normal.         Behavior: Behavior normal.         Thought Content: Thought content normal.         Judgment: Judgment normal.       Arik Vasquez MD

## 2024-01-24 NOTE — ASSESSMENT & PLAN NOTE
Lab Results   Component Value Date    EGFR 49 11/22/2023    EGFR 39 10/13/2023    EGFR 35 07/26/2023    CREATININE 1.08 11/22/2023    CREATININE 1.31 (H) 10/13/2023    CREATININE 1.42 (H) 07/26/2023   Pt to avoid NSAIDs and any IV dyes. Patient to follow up eoither with nephrology or  with us for  further  monitoring of  renal function.

## 2024-01-24 NOTE — ASSESSMENT & PLAN NOTE
Patient to continue with weight bearing exercises as much as possible and oral intake of 1200 mg of calcium with 800 IU of Vit D to maximize bone protection. Pt  to continue  with DEXA scan every 2 years to reassess. All qustions regarding this problem answered today to patient satisfaction.

## 2024-01-24 NOTE — PROGRESS NOTES
Assessment and Plan:     Problem List Items Addressed This Visit     Atrial fibrillation (HCC)     Continue with anticogulation and rate control of heart rate. Concerns about condition were addressed today.          Cardiomyopathy (HCC)    Hypertension     Patient is stable with current anti-hypertensive medicine and continue to follow a low sodium diet and take current medication. All questions about this condition were answered today.          Hyperlipidemia     Patient  is stable with current medication and we discussed a low fat low cholesterol diet. Weight loss also discussed for this will help lower cholesterol also. Recheck lipids in 6 months.          Anxiety     Patient to continue utilizing medical therapy as well as counseling sources as applicable to condition. If suicidal thought or fear of suicide attempt, to call 911 and seek help immediately. Medications and therapy reviewed today and all patient  questions answered today.          Type 2 diabetes mellitus with diabetic peripheral angiopathy without gangrene (HCC)     Needs better control  Lab Results   Component Value Date    HGBA1C 8.9 (H) 11/22/2023          Relevant Orders    IRIS Diabetic eye exam    Hemoglobin A1C    Comprehensive metabolic panel    Hypothyroidism     Patient to continue current dose of thyroid medicine and recheck TSH in 6 months          Relevant Orders    TSH, 3rd generation with Free T4 reflex    Age-related osteoporosis without current pathological fracture     Patient to continue with weight bearing exercises as much as possible and oral intake of 1200 mg of calcium with 800 IU of Vit D to maximize bone protection. Pt  to continue  with DEXA scan every 2 years to reassess. All qustions regarding this problem answered today to patient satisfaction.          Stage 3a chronic kidney disease (HCC)     Lab Results   Component Value Date    EGFR 49 11/22/2023    EGFR 39 10/13/2023    EGFR 35 07/26/2023    CREATININE 1.08  11/22/2023    CREATININE 1.31 (H) 10/13/2023    CREATININE 1.42 (H) 07/26/2023   Pt to avoid NSAIDs and any IV dyes. Patient to follow up eoither with nephrology or  with us for  further  monitoring of  renal function.          Protein-calorie malnutrition, unspecified severity (HCC)   Other Visit Diagnoses     Well adult exam    -  Primary           Preventive health issues were discussed with patient, and age appropriate screening tests were ordered as noted in patient's After Visit Summary.  Personalized health advice and appropriate referrals for health education or preventive services given if needed, as noted in patient's After Visit Summary.     History of Present Illness:     Patient presents for a Medicare Wellness Visit    HPI   Patient Care Team:  Arik Vasquez MD as PCP - General (Family Medicine)  Nicolle Reynolds MD     Review of Systems:     Review of Systems     Problem List:     Patient Active Problem List   Diagnosis   • Atrial fibrillation (HCC)   • Cardiomyopathy (HCC)   • Hypertension   • Hyperlipidemia   • Chest pain   • Pelvic pain   • Ankle joint effusion   • Anxiety   • Arthritis   • Type 2 diabetes mellitus with diabetic peripheral angiopathy without gangrene (HCC)   • Irritable bowel syndrome   • Mitral valvular disorder   • Neuropathy   • Peripheral vascular disease (HCC)   • Reflex sympathetic dystrophy of other specified site   • Hypothyroidism   • Age-related osteoporosis without current pathological fracture   • Stage 3a chronic kidney disease (HCC)   • Protein-calorie malnutrition, unspecified severity (HCC)      Past Medical and Surgical History:     Past Medical History:   Diagnosis Date   • Anxiety disorder    • Atrial fibrillation (HCC)    • Cardiac arrhythmia    • Cardiomyopathy (HCC)    • Colitis    • Diabetes mellitus (HCC)    • Dry eyes    • Dry mouth    • HTN (hypertension)    • Hyperlipidemia    • IBS (irritable bowel syndrome)    • MVP (mitral valve prolapse)    •  Osteoporosis    • SOB (shortness of breath)    • Tachycardia      Past Surgical History:   Procedure Laterality Date   • CATARACT EXTRACTION Bilateral    • DILATION AND CURETTAGE OF UTERUS      x2   • WRIST SURGERY        Family History:     Family History   Problem Relation Age of Onset   • Heart attack Mother    • Diabetes Mother    • Heart attack Father    • Colon cancer Sister         unknown age   • Stomach cancer Sister    • Stomach cancer Sister 55   • No Known Problems Sister    • No Known Problems Sister    • No Known Problems Sister    • No Known Problems Maternal Grandmother    • No Known Problems Paternal Grandmother    • No Known Problems Maternal Aunt    • No Known Problems Paternal Aunt    • No Known Problems Paternal Aunt    • Breast cancer Neg Hx       Social History:     Social History     Socioeconomic History   • Marital status: /Civil Union     Spouse name: None   • Number of children: None   • Years of education: None   • Highest education level: None   Occupational History   • Occupation: Retired   Tobacco Use   • Smoking status: Never     Passive exposure: Never   • Smokeless tobacco: Never   Vaping Use   • Vaping status: Never Used   Substance and Sexual Activity   • Alcohol use: No   • Drug use: Never   • Sexual activity: Not Currently   Other Topics Concern   • None   Social History Narrative    · Most recent tobacco use screenin2019      · Do you currently or have you served in the [employer]:   No      · Were you activated, into active duty, as a member of the [employer] or as a Reservist:   No      · Sexual orientation:   Heterosexual      · Diet:   Diabetic       · Caffeine intake:   Occasional      · Seat belts used routinely:   Yes      · Sunscreen used routinely:   No      · Smoke alarm in home:   Yes      · Advance directive:   Yes      · Salt Intake:   not much      Social Determinants of Health     Financial Resource Strain: Low Risk  (2024)     Overall Financial Resource Strain (CARDIA)    • Difficulty of Paying Living Expenses: Not hard at all   Food Insecurity: Not on file   Transportation Needs: No Transportation Needs (1/24/2024)    PRAPARE - Transportation    • Lack of Transportation (Medical): No    • Lack of Transportation (Non-Medical): No   Physical Activity: Not on file   Stress: Not on file   Social Connections: Not on file   Intimate Partner Violence: Not on file   Housing Stability: Not on file      Medications and Allergies:     Current Outpatient Medications   Medication Sig Dispense Refill   • benzonatate (TESSALON PERLES) 100 mg capsule Take 1 capsule (100 mg total) by mouth 3 (three) times a day as needed for cough 60 capsule 0   • Cholecalciferol (VITAMIN D3 PO) Vitamin D3     • cyclobenzaprine (FLEXERIL) 10 mg tablet TAKE 1 TABLET BY MOUTH THREE TIMES A DAY AS NEEDED FOR MUSCLE SPASMS 270 tablet 1   • denosumab (PROLIA) 60 mg/mL Inject 60 mg under the skin every 6 (six) months     • dextromethorphan-guaiFENesin (ROBITUSSIN-DM)  mg/5 mL oral liquid Take 5 mL by mouth every 12 (twelve) hours 180 mL 0   • dicyclomine (BENTYL) 10 mg capsule TAKE 1 CAPSULE (10 MG TOTAL) BY MOUTH EVERY 6 (SIX) HOURS AS NEEDED (NAUSEA) 360 capsule 1   • digoxin (LANOXIN) 0.125 mg tablet TAKE 1 TABLET BY MOUTH EVERY DAY 90 tablet 3   • Eliquis 5 MG TAKE 1 TABLET BY MOUTH TWICE A  tablet 3   • ezetimibe-simvastatin (VYTORIN) 10-20 mg per tablet TAKE 1 TABLET BY MOUTH EVERYDAY AT BEDTIME 90 tablet 1   • Januvia 100 MG tablet Take 1 tablet (100 mg total) by mouth daily 90 tablet 1   • levocetirizine (XYZAL) 5 MG tablet TAKE 1 TABLET BY MOUTH EVERY DAY IN THE EVENING 90 tablet 1   • levothyroxine (Euthyrox) 75 mcg tablet Take 1 tablet (75 mcg total) by mouth daily in the early morning 90 tablet 1   • lisinopril (ZESTRIL) 2.5 mg tablet Take 1 tablet (2.5 mg total) by mouth daily 90 tablet 2   • metFORMIN (GLUCOPHAGE) 500 mg tablet TAKE 2 TABLETS BY  MOUTH TWICE A  tablet 1   • metoprolol tartrate (LOPRESSOR) 50 mg tablet Take 1 tablet (50 mg total) by mouth every 12 (twelve) hours 60 tablet 5   • Multiple Vitamins-Minerals (CENTRUM SILVER PO) Take 1 tablet by mouth daily     • ondansetron (ZOFRAN) 8 mg tablet TAKE 0.5 TABLETS (4 MG TOTAL) BY MOUTH EVERY 8 (EIGHT) HOURS AS NEEDED FOR NAUSEA OR VOMITING 18 tablet 29   • oxybutynin (DITROPAN-XL) 10 MG 24 hr tablet TAKE 2 TABLETS (20 MG TOTAL) BY MOUTH DAILY AT BEDTIME 180 tablet 0   • pantoprazole (PROTONIX) 40 mg tablet TAKE 1 TABLET BY MOUTH TWICE A  tablet 3   • senna (SENOKOT) 8.6 MG tablet Take 1 tablet by mouth daily     • spironolactone (ALDACTONE) 25 mg tablet TAKE 1 TABLET BY MOUTH EVERY DAY 90 tablet 1     No current facility-administered medications for this visit.     Allergies   Allergen Reactions   • Butoconazole    • Moxifloxacin    • Neomycin-Bacitracin Zn-Polymyx    • Smz-Tmp Ds [Sulfamethoxazole-Trimethoprim]    • Sulfa Antibiotics    • Telithromycin Diarrhea      Immunizations:     Immunization History   Administered Date(s) Administered   • COVID-19 MODERNA VACC 0.25 ML IM BOOSTER 12/15/2021   • COVID-19 MODERNA VACC 0.5 ML IM 04/07/2021, 05/05/2021, 12/15/2021      Health Maintenance:         Topic Date Due   • Breast Cancer Screening: Mammogram  06/21/2024   • Hepatitis C Screening  Completed         Topic Date Due   • Pneumococcal Vaccine: 65+ Years (1 - PCV) Never done   • Influenza Vaccine (1) Never done   • COVID-19 Vaccine (5 - 2023-24 season) 09/01/2023      Medicare Screening Tests and Risk Assessments:     Hannah is here for her Subsequent Wellness visit. Last Medicare Wellness visit information reviewed, patient interviewed and updates made to the record today.      Health Risk Assessment:   Patient rates overall health as good. Patient feels that their physical health rating is same. Patient is satisfied with their life. Eyesight was rated as slightly worse. Hearing  was rated as same. Patient feels that their emotional and mental health rating is same. Patients states they are never, rarely angry. Patient states they are sometimes unusually tired/fatigued. Pain experienced in the last 7 days has been some. Patient's pain rating has been 2/10. Patient states that she has experienced no weight loss or gain in last 6 months.     Fall Risk Screening:   In the past year, patient has experienced: no history of falling in past year      Urinary Incontinence Screening:   Patient has not leaked urine accidently in the last six months.     Home Safety:  Patient does not have trouble with stairs inside or outside of their home. Patient has working smoke alarms and has working carbon monoxide detector. Home safety hazards include: none.     Nutrition:   Current diet is Regular.     Medications:   Patient is not currently taking any over-the-counter supplements. Patient is able to manage medications.     Activities of Daily Living (ADLs)/Instrumental Activities of Daily Living (IADLs):   Walk and transfer into and out of bed and chair?: Yes  Dress and groom yourself?: Yes    Bathe or shower yourself?: Yes    Feed yourself? Yes  Do your laundry/housekeeping?: Yes  Manage your money, pay your bills and track your expenses?: Yes  Make your own meals?: Yes    Do your own shopping?: Yes    Previous Hospitalizations:   Any hospitalizations or ED visits within the last 12 months?: No      Advance Care Planning:   Living will: No      PREVENTIVE SCREENINGS      Cardiovascular Screening:    General: Screening Not Indicated and History Lipid Disorder      Diabetes Screening:     General: Screening Not Indicated and History Diabetes      Breast Cancer Screening:     General: Screening Current      Cervical Cancer Screening:    General: Screening Not Indicated      Osteoporosis Screening:    General: Screening Not Indicated and History Osteoporosis      Lung Cancer Screening:     General: Screening Not  "Indicated      Hepatitis C Screening:    General: Screening Current    Screening, Brief Intervention, and Referral to Treatment (SBIRT)    Screening  Typical number of drinks in a day: 0  Typical number of drinks in a week: 0  Interpretation: Low risk drinking behavior.    No results found.     Physical Exam:     /70 (BP Location: Left arm, Patient Position: Sitting, Cuff Size: Standard)   Pulse 86   Temp (!) 97 °F (36.1 °C)   Resp 16   Ht 5' 4\" (1.626 m)   Wt 6.35 kg (14 lb)   SpO2 97%   BMI 2.40 kg/m²     Physical Exam     Arik Vasquez MD  "

## 2024-02-21 DIAGNOSIS — J01.10 SUBACUTE FRONTAL SINUSITIS: ICD-10-CM

## 2024-02-21 DIAGNOSIS — I48.20 CHRONIC A-FIB (HCC): ICD-10-CM

## 2024-02-21 DIAGNOSIS — E11.9 TYPE 2 DIABETES MELLITUS WITHOUT COMPLICATION, WITHOUT LONG-TERM CURRENT USE OF INSULIN (HCC): ICD-10-CM

## 2024-02-21 DIAGNOSIS — N32.81 OAB (OVERACTIVE BLADDER): ICD-10-CM

## 2024-02-21 DIAGNOSIS — E78.2 MIXED HYPERLIPIDEMIA: ICD-10-CM

## 2024-02-21 RX ORDER — OXYBUTYNIN CHLORIDE 10 MG/1
20 TABLET, EXTENDED RELEASE ORAL
Qty: 180 TABLET | Refills: 0 | Status: SHIPPED | OUTPATIENT
Start: 2024-02-21

## 2024-02-21 RX ORDER — EZETIMIBE AND SIMVASTATIN 10; 20 MG/1; MG/1
TABLET ORAL
Qty: 90 TABLET | Refills: 1 | Status: SHIPPED | OUTPATIENT
Start: 2024-02-21

## 2024-02-21 RX ORDER — BENZONATATE 100 MG/1
CAPSULE ORAL
Qty: 60 CAPSULE | Refills: 0 | Status: SHIPPED | OUTPATIENT
Start: 2024-02-21

## 2024-02-21 RX ORDER — METOPROLOL TARTRATE 50 MG/1
50 TABLET, FILM COATED ORAL EVERY 12 HOURS
Qty: 180 TABLET | Refills: 3 | Status: SHIPPED | OUTPATIENT
Start: 2024-02-21

## 2024-02-21 NOTE — TELEPHONE ENCOUNTER
Pt last seen 1/24/24, has appt 3/27/24.    Pt requesting:  EZETIMIBE-SIMVASTATIN 10-20 MG and METFORMIN  MG TABLET. Request sent to provider.

## 2024-03-05 DIAGNOSIS — N32.81 OAB (OVERACTIVE BLADDER): ICD-10-CM

## 2024-03-05 RX ORDER — OXYBUTYNIN CHLORIDE 10 MG/1
20 TABLET, EXTENDED RELEASE ORAL
Qty: 180 TABLET | Refills: 1 | Status: SHIPPED | OUTPATIENT
Start: 2024-03-05

## 2024-03-13 ENCOUNTER — TELEPHONE (OUTPATIENT)
Age: 79
End: 2024-03-13

## 2024-03-13 DIAGNOSIS — J01.10 SUBACUTE FRONTAL SINUSITIS: ICD-10-CM

## 2024-03-13 DIAGNOSIS — E03.9 HYPOTHYROIDISM, UNSPECIFIED TYPE: ICD-10-CM

## 2024-03-13 RX ORDER — LEVOTHYROXINE SODIUM 0.07 MG/1
75 TABLET ORAL
Qty: 90 TABLET | Refills: 1 | Status: SHIPPED | OUTPATIENT
Start: 2024-03-13

## 2024-03-13 RX ORDER — BENZONATATE 100 MG/1
CAPSULE ORAL
Qty: 60 CAPSULE | Refills: 0 | Status: SHIPPED | OUTPATIENT
Start: 2024-03-13

## 2024-03-13 NOTE — TELEPHONE ENCOUNTER
"Hannah calling for refill of Spironolactone 25 mg- Advised on 7/30/23 it was sent to [facility] for 90 with refill.     Her bottle she has states last fill date 5/24/23 for 90.     Call made to [facility] in [place]-  did not get pharmacist name- Call made back to [facility] after call ended with patient- Bob did confirm patient signed for refill on 3/2/24. Bob did note that he put a \"loss of medication\" claim to insurance and was able to get the override for patient. [facility] will fill for [facility] and Hannah will get notified.     Hannah reports she does not know or have the refill from 3/2/24. Pharmacist and my self tried to help figure out a plan for her- but she became anxious while on phone, mentioned something about pharmacy only giving her 60 tablets at a time.. ( which I could not verify)     Hannah then ended call before refill specialist could assist further.   "

## 2024-04-23 DIAGNOSIS — L65.9 ALOPECIA: ICD-10-CM

## 2024-04-23 DIAGNOSIS — I10 HYPERTENSION, ESSENTIAL: ICD-10-CM

## 2024-04-23 DIAGNOSIS — J01.10 SUBACUTE FRONTAL SINUSITIS: ICD-10-CM

## 2024-04-24 RX ORDER — BENZONATATE 100 MG/1
CAPSULE ORAL
Qty: 60 CAPSULE | Refills: 1 | Status: SHIPPED | OUTPATIENT
Start: 2024-04-24

## 2024-04-24 RX ORDER — SPIRONOLACTONE 25 MG/1
TABLET ORAL
Qty: 90 TABLET | Refills: 1 | Status: SHIPPED | OUTPATIENT
Start: 2024-04-24

## 2024-04-25 RX ORDER — LISINOPRIL 2.5 MG/1
2.5 TABLET ORAL DAILY
Qty: 90 TABLET | Refills: 1 | Status: SHIPPED | OUTPATIENT
Start: 2024-04-25

## 2024-05-03 DIAGNOSIS — E11.9 TYPE 2 DIABETES MELLITUS WITHOUT COMPLICATION, WITHOUT LONG-TERM CURRENT USE OF INSULIN (HCC): ICD-10-CM

## 2024-05-04 RX ORDER — SITAGLIPTIN 100 MG/1
100 TABLET, FILM COATED ORAL DAILY
Qty: 90 TABLET | Refills: 1 | Status: SHIPPED | OUTPATIENT
Start: 2024-05-04

## 2024-05-15 DIAGNOSIS — J30.1 ALLERGIC RHINITIS DUE TO POLLEN, UNSPECIFIED SEASONALITY: ICD-10-CM

## 2024-05-15 DIAGNOSIS — R10.9 ABDOMINAL PAIN, UNSPECIFIED ABDOMINAL LOCATION: ICD-10-CM

## 2024-05-15 DIAGNOSIS — M62.838 MUSCLE SPASM: ICD-10-CM

## 2024-05-16 RX ORDER — CYCLOBENZAPRINE HCL 10 MG
TABLET ORAL
Qty: 270 TABLET | Refills: 1 | Status: SHIPPED | OUTPATIENT
Start: 2024-05-16

## 2024-05-16 RX ORDER — PANTOPRAZOLE SODIUM 40 MG/1
TABLET, DELAYED RELEASE ORAL
Qty: 180 TABLET | Refills: 1 | Status: SHIPPED | OUTPATIENT
Start: 2024-05-16

## 2024-05-16 RX ORDER — LEVOCETIRIZINE DIHYDROCHLORIDE 5 MG/1
TABLET, FILM COATED ORAL
Qty: 90 TABLET | Refills: 1 | Status: SHIPPED | OUTPATIENT
Start: 2024-05-16

## 2024-06-25 ENCOUNTER — RA CDI HCC (OUTPATIENT)
Dept: OTHER | Facility: HOSPITAL | Age: 79
End: 2024-06-25

## 2024-06-27 ENCOUNTER — APPOINTMENT (OUTPATIENT)
Dept: LAB | Facility: CLINIC | Age: 79
End: 2024-06-27
Payer: MEDICARE

## 2024-06-27 DIAGNOSIS — E03.4 HYPOTHYROIDISM DUE TO ACQUIRED ATROPHY OF THYROID: ICD-10-CM

## 2024-06-27 DIAGNOSIS — E11.51 TYPE 2 DIABETES MELLITUS WITH DIABETIC PERIPHERAL ANGIOPATHY WITHOUT GANGRENE, WITHOUT LONG-TERM CURRENT USE OF INSULIN (HCC): ICD-10-CM

## 2024-06-27 LAB
ALBUMIN SERPL BCG-MCNC: 4.6 G/DL (ref 3.5–5)
ALP SERPL-CCNC: 59 U/L (ref 34–104)
ALT SERPL W P-5'-P-CCNC: 8 U/L (ref 7–52)
ANION GAP SERPL CALCULATED.3IONS-SCNC: 12 MMOL/L (ref 4–13)
AST SERPL W P-5'-P-CCNC: 17 U/L (ref 13–39)
BILIRUB SERPL-MCNC: 0.83 MG/DL (ref 0.2–1)
BUN SERPL-MCNC: 20 MG/DL (ref 5–25)
CALCIUM SERPL-MCNC: 9.9 MG/DL (ref 8.4–10.2)
CHLORIDE SERPL-SCNC: 98 MMOL/L (ref 96–108)
CO2 SERPL-SCNC: 28 MMOL/L (ref 21–32)
CREAT SERPL-MCNC: 1.18 MG/DL (ref 0.6–1.3)
GFR SERPL CREATININE-BSD FRML MDRD: 44 ML/MIN/1.73SQ M
GLUCOSE P FAST SERPL-MCNC: 133 MG/DL (ref 65–99)
POTASSIUM SERPL-SCNC: 4.6 MMOL/L (ref 3.5–5.3)
PROT SERPL-MCNC: 7.8 G/DL (ref 6.4–8.4)
SODIUM SERPL-SCNC: 138 MMOL/L (ref 135–147)
TSH SERPL DL<=0.05 MIU/L-ACNC: 1.16 UIU/ML (ref 0.45–4.5)

## 2024-06-27 PROCEDURE — 80053 COMPREHEN METABOLIC PANEL: CPT

## 2024-06-27 PROCEDURE — 83036 HEMOGLOBIN GLYCOSYLATED A1C: CPT

## 2024-06-27 PROCEDURE — 36415 COLL VENOUS BLD VENIPUNCTURE: CPT

## 2024-06-27 PROCEDURE — 84443 ASSAY THYROID STIM HORMONE: CPT

## 2024-06-28 LAB
EST. AVERAGE GLUCOSE BLD GHB EST-MCNC: 226 MG/DL
HBA1C MFR BLD: 9.5 %

## 2024-06-29 DIAGNOSIS — E11.9 TYPE 2 DIABETES MELLITUS WITHOUT COMPLICATION, WITHOUT LONG-TERM CURRENT USE OF INSULIN (HCC): ICD-10-CM

## 2024-06-29 DIAGNOSIS — J01.10 SUBACUTE FRONTAL SINUSITIS: ICD-10-CM

## 2024-06-29 DIAGNOSIS — R11.2 NAUSEA AND VOMITING: ICD-10-CM

## 2024-06-29 DIAGNOSIS — E78.2 MIXED HYPERLIPIDEMIA: ICD-10-CM

## 2024-06-29 RX ORDER — EZETIMIBE AND SIMVASTATIN 10; 20 MG/1; MG/1
TABLET ORAL
Qty: 90 TABLET | Refills: 1 | Status: SHIPPED | OUTPATIENT
Start: 2024-06-29

## 2024-06-29 RX ORDER — DICYCLOMINE HYDROCHLORIDE 10 MG/1
CAPSULE ORAL
Qty: 360 CAPSULE | Refills: 1 | Status: SHIPPED | OUTPATIENT
Start: 2024-06-29

## 2024-07-01 RX ORDER — BENZONATATE 100 MG/1
CAPSULE ORAL
Qty: 60 CAPSULE | Refills: 1 | Status: SHIPPED | OUTPATIENT
Start: 2024-07-01

## 2024-07-05 ENCOUNTER — OFFICE VISIT (OUTPATIENT)
Dept: FAMILY MEDICINE CLINIC | Facility: CLINIC | Age: 79
End: 2024-07-05
Payer: MEDICARE

## 2024-07-05 VITALS
OXYGEN SATURATION: 98 % | BODY MASS INDEX: 19.38 KG/M2 | WEIGHT: 113.5 LBS | DIASTOLIC BLOOD PRESSURE: 66 MMHG | HEART RATE: 88 BPM | SYSTOLIC BLOOD PRESSURE: 118 MMHG | HEIGHT: 64 IN | RESPIRATION RATE: 16 BRPM | TEMPERATURE: 97.9 F

## 2024-07-05 DIAGNOSIS — I48.91 ATRIAL FIBRILLATION, UNSPECIFIED TYPE (HCC): ICD-10-CM

## 2024-07-05 DIAGNOSIS — E11.51 TYPE 2 DIABETES MELLITUS WITH DIABETIC PERIPHERAL ANGIOPATHY WITHOUT GANGRENE, WITHOUT LONG-TERM CURRENT USE OF INSULIN (HCC): Primary | ICD-10-CM

## 2024-07-05 DIAGNOSIS — N18.31 STAGE 3A CHRONIC KIDNEY DISEASE (HCC): ICD-10-CM

## 2024-07-05 DIAGNOSIS — F41.9 ANXIETY: ICD-10-CM

## 2024-07-05 DIAGNOSIS — M81.0 AGE-RELATED OSTEOPOROSIS WITHOUT CURRENT PATHOLOGICAL FRACTURE: ICD-10-CM

## 2024-07-05 DIAGNOSIS — E78.2 MIXED HYPERLIPIDEMIA: ICD-10-CM

## 2024-07-05 DIAGNOSIS — E03.4 HYPOTHYROIDISM DUE TO ACQUIRED ATROPHY OF THYROID: ICD-10-CM

## 2024-07-05 DIAGNOSIS — I10 PRIMARY HYPERTENSION: ICD-10-CM

## 2024-07-05 DIAGNOSIS — Z12.31 ENCOUNTER FOR SCREENING MAMMOGRAM FOR BREAST CANCER: ICD-10-CM

## 2024-07-05 PROCEDURE — G2211 COMPLEX E/M VISIT ADD ON: HCPCS | Performed by: FAMILY MEDICINE

## 2024-07-05 PROCEDURE — 99214 OFFICE O/P EST MOD 30 MIN: CPT | Performed by: FAMILY MEDICINE

## 2024-07-05 RX ORDER — GLIMEPIRIDE 4 MG/1
4 TABLET ORAL
Qty: 100 TABLET | Refills: 3 | Status: SHIPPED | OUTPATIENT
Start: 2024-07-05

## 2024-07-05 NOTE — PROGRESS NOTES
Ambulatory Visit  Name: Hannah Marinelli      : 1945      MRN: [id number]  Encounter Provider: Arik Vasquez MD  Encounter Date: 2024   Encounter department: [facility]    Assessment & Plan   1. Type 2 diabetes mellitus with diabetic peripheral angiopathy without gangrene, without long-term current use of insulin (HCC)  Assessment & Plan:  Needs better control  Lab Results   Component Value Date    HGBA1C 9.5 (H) 2024     Orders:  -     Albumin / creatinine urine ratio; Future  -     IRIS Diabetic eye exam  -     Comprehensive metabolic panel; Future; Expected date: 10/04/2024  -     Hemoglobin A1C; Future; Expected date: 10/04/2024  -     Ambulatory referral to Diabetic Education - use to refer for diabetes group classes, individual diabetes education, medical nutrition therapy, device training; Future; Expected date: 2024  -     glimepiride (AMARYL) 4 mg tablet; Take 1 tablet (4 mg total) by mouth daily with breakfast  2. Encounter for screening mammogram for breast cancer  -     Mammo screening bilateral w 3d & cad; Future; Expected date: 2024  3. Age-related osteoporosis without current pathological fracture  Assessment & Plan:  Patient to continue with weight bearing exercises as much as possible and oral intake of 1200 mg of calcium with 800 IU of Vit D to maximize bone protection. Pt  to continue  with DEXA scan every 2 years to reassess. All qustions regarding this problem answered today to patient satisfaction.   4. Anxiety  Assessment & Plan:  Patient to continue utilizing medical therapy as well as counseling sources as applicable to condition. If suicidal thought or fear of suicide attempt, to call 911 and seek help immediately. Medications and therapy reviewed today and all patient  questions answered today.   5. Atrial fibrillation, unspecified type (HCC)  Assessment & Plan:  Continue with anticogulation and rate control of heart rate.  Concerns about condition were addressed today.   6. Mixed hyperlipidemia  Assessment & Plan:  Patient  is stable with current medication and we discussed a low fat low cholesterol diet. Weight loss also discussed for this will help lower cholesterol also. Recheck lipids in 6 months.   7. Primary hypertension  Assessment & Plan:  Patient is stable with current anti-hypertensive medicine and continue to follow a low sodium diet and take current medication. All questions about this condition were answered today.   8. Hypothyroidism due to acquired atrophy of thyroid  Assessment & Plan:  Patient to continue current dose of thyroid medicine and recheck TSH in 6 months   9. Stage 3a chronic kidney disease (HCC)  Assessment & Plan:  Lab Results   Component Value Date    EGFR 44 06/27/2024    EGFR 49 11/22/2023    EGFR 39 10/13/2023    CREATININE 1.18 06/27/2024    CREATININE 1.08 11/22/2023    CREATININE 1.31 (H) 10/13/2023   Pt to avoid NSAIDs and any IV dyes. Patient to follow up eoither with nephrology or  with us for  further  monitoring of  renal function.       Falls Plan of Care: balance, strength, and gait training instructions were provided. Home safety education provided.     Urinary Incontinence Plan of Care: counseling topics discussed: practice Kegel (pelvic floor strengthening) exercises, use restroom every 2 hours, limit alcohol, caffeine, spicy foods, and acidic foods, limiting fluid intake 3-4 hours before bed, weight loss, preventing constipation and limiting fluid intake to 60 oz. per day.         History of Present Illness     79year-old female today for checkup on multimedical problems.  Patient with type 2 diabetes hypertension atrial fibrillation hypertension osteoporosis hyperlipidemia CKD stage IIIa.  The patient is here today she has a problem with her right leg she is having some swelling in her leg and some chronic venous stasis changes she is having some varicosities that are becoming painful  at times for her some days she has problems some days she does not discussed with her that she has painful varicosities and may need to see vascular about having them removed discussed with her about this and at this time she is not looking forward to having them removed or seeing anybody because not that bad just yet and talk to her about seeing somebody for this problem if she needs to.  She will let me know when she wants to see somebody about that.  The next thing we talked about was her diabetes which is getting worse her A1c is high at 9.5 and she states she is not really eating that well she eats a lot of carbs and she is really mourning the loss of her  now almost 2 years.  She lives alone with her dog and she really does not really follow that good of a diet I talked her about talking to a dietitian to find a better eating plan for her lifestyle and she is okay with that I have also learned that she really has a hard time swallowing metformin she states that the metformin pills are way too big for her to swallow and working to see about trying something else working to see about giving her some Klim Epimide 4 mg twice a day in place of that because she really cannot swallow the metformin.  Will continue with the Januvia and will get see how she does with her diabetes at that time we also may see about maybe adding some Actos in the future but at this point we will going to give her 3-month supply off of metformin and give her the Amaryl and see how she does with her diabetes.      Review of Systems   Constitutional:  Negative for activity change, appetite change, fatigue and fever.   HENT:  Negative for congestion, ear pain, postnasal drip, rhinorrhea, sinus pressure, sinus pain, sneezing and sore throat.    Eyes:  Negative for pain and redness.   Respiratory:  Negative for apnea, cough, chest tightness, shortness of breath and wheezing.    Cardiovascular:  Negative for chest pain, palpitations and  leg swelling.   Gastrointestinal:  Negative for abdominal pain, constipation, diarrhea, nausea and vomiting.   Endocrine: Negative for cold intolerance and heat intolerance.   Genitourinary:  Negative for difficulty urinating, dysuria, frequency, hematuria and urgency.   Musculoskeletal:  Negative for arthralgias, back pain, gait problem and myalgias.   Skin:  Negative for rash.   Neurological:  Negative for dizziness, speech difficulty, weakness, numbness and headaches.   Hematological:  Does not bruise/bleed easily.   Psychiatric/Behavioral:  Negative for agitation, confusion and hallucinations.      Past Medical History:   Diagnosis Date   • Anxiety disorder    • Atrial fibrillation (HCC)    • Cardiac arrhythmia    • Cardiomyopathy (HCC)    • Colitis    • Diabetes mellitus (HCC)    • Dry eyes    • Dry mouth    • HTN (hypertension)    • Hyperlipidemia    • IBS (irritable bowel syndrome)    • MVP (mitral valve prolapse)    • Osteoporosis    • SOB (shortness of breath)    • Tachycardia      Past Surgical History:   Procedure Laterality Date   • CATARACT EXTRACTION Bilateral    • DILATION AND CURETTAGE OF UTERUS      x2   • WRIST SURGERY       Family History   Problem Relation Age of Onset   • Heart attack Mother    • Diabetes Mother    • Heart attack Father    • Colon cancer Sister         unknown age   • Stomach cancer Sister    • Stomach cancer Sister 55   • No Known Problems Sister    • No Known Problems Sister    • No Known Problems Sister    • No Known Problems Maternal Grandmother    • No Known Problems Paternal Grandmother    • No Known Problems Maternal Aunt    • No Known Problems Paternal Aunt    • No Known Problems Paternal Aunt    • Breast cancer Neg Hx      Social History     Tobacco Use   • Smoking status: Never     Passive exposure: Never   • Smokeless tobacco: Never   Vaping Use   • Vaping status: Never Used   Substance and Sexual Activity   • Alcohol use: No   • Drug use: Never   • Sexual activity:  Not Currently     Current Outpatient Medications on File Prior to Visit   Medication Sig   • benzonatate (TESSALON PERLES) 100 mg capsule TAKE 1 CAPSULE BY MOUTH THREE TIMES A DAY AS NEEDED FOR COUGH   • Cholecalciferol (VITAMIN D3 PO) Vitamin D3   • cyclobenzaprine (FLEXERIL) 10 mg tablet TAKE 1 TABLET BY MOUTH THREE TIMES A DAY AS NEEDED FOR MUSCLE SPASMS   • denosumab (PROLIA) 60 mg/mL Inject 60 mg under the skin every 6 (six) months   • dextromethorphan-guaiFENesin (ROBITUSSIN-DM)  mg/5 mL oral liquid Take 5 mL by mouth every 12 (twelve) hours   • dicyclomine (BENTYL) 10 mg capsule TAKE 1 CAPSULE BY MOUTH EVERY 6 HOURS AS NEEDED (NAUSEA)   • digoxin (LANOXIN) 0.125 mg tablet TAKE 1 TABLET BY MOUTH EVERY DAY   • Eliquis 5 MG TAKE 1 TABLET BY MOUTH TWICE A DAY   • ezetimibe-simvastatin (VYTORIN) 10-20 mg per tablet TAKE 1 TABLET BY MOUTH EVERYDAY AT BEDTIME   • Januvia 100 MG tablet TAKE 1 TABLET BY MOUTH EVERY DAY   • levocetirizine (XYZAL) 5 MG tablet TAKE 1 TABLET BY MOUTH EVERY DAY IN THE EVENING   • levothyroxine 75 mcg tablet TAKE 1 TABLET (75 MCG TOTAL) BY MOUTH DAILY IN THE EARLY MORNING   • lisinopril (ZESTRIL) 2.5 mg tablet TAKE 1 TABLET BY MOUTH EVERY DAY   • metoprolol tartrate (LOPRESSOR) 50 mg tablet TAKE 1 TABLET BY MOUTH EVERY 12 HOURS.   • Multiple Vitamins-Minerals (CENTRUM SILVER PO) Take 1 tablet by mouth daily   • oxybutynin (DITROPAN-XL) 10 MG 24 hr tablet Take 2 tablets (20 mg total) by mouth daily at bedtime   • pantoprazole (PROTONIX) 40 mg tablet TAKE 1 TABLET BY MOUTH TWICE A DAY   • senna (SENOKOT) 8.6 MG tablet Take 1 tablet by mouth daily   • spironolactone (ALDACTONE) 25 mg tablet TAKE 1 TABLET BY MOUTH EVERY DAY   • [DISCONTINUED] metFORMIN (GLUCOPHAGE) 500 mg tablet TAKE 2 TABLETS BY MOUTH TWICE A DAY   • [DISCONTINUED] ondansetron (ZOFRAN) 8 mg tablet TAKE 0.5 TABLETS (4 MG TOTAL) BY MOUTH EVERY 8 (EIGHT) HOURS AS NEEDED FOR NAUSEA OR VOMITING     Allergies   Allergen  "Reactions   • Butoconazole    • Moxifloxacin    • Neomycin-Bacitracin Zn-Polymyx    • Smz-Tmp Ds [Sulfamethoxazole-Trimethoprim]    • Sulfa Antibiotics    • Telithromycin Diarrhea     Immunization History   Administered Date(s) Administered   • COVID-19 MODERNA VACC 0.25 ML IM BOOSTER 12/15/2021   • COVID-19 MODERNA VACC 0.5 ML IM 04/07/2021, 05/05/2021, 12/15/2021     Objective     /66 (BP Location: Left arm, Patient Position: Sitting, Cuff Size: Standard)   Pulse 88   Temp 97.9 °F (36.6 °C)   Resp 16   Ht 5' 4\" (1.626 m)   Wt 51.5 kg (113 lb 8 oz)   SpO2 98%   BMI 19.48 kg/m²     Physical Exam  Constitutional:       Appearance: Normal appearance. She is not ill-appearing.   HENT:      Head: Normocephalic and atraumatic.      Right Ear: Tympanic membrane normal.      Left Ear: Tympanic membrane normal.      Nose: Nose normal.      Mouth/Throat:      Mouth: Mucous membranes are moist.   Eyes:      Extraocular Movements: Extraocular movements intact.      Conjunctiva/sclera: Conjunctivae normal.      Pupils: Pupils are equal, round, and reactive to light.   Cardiovascular:      Rate and Rhythm: Normal rate and regular rhythm.   Pulmonary:      Effort: Pulmonary effort is normal. No respiratory distress.      Breath sounds: Normal breath sounds. No wheezing.   Abdominal:      General: Bowel sounds are normal.      Palpations: Abdomen is soft.      Tenderness: There is no abdominal tenderness.   Musculoskeletal:         General: Tenderness present.      Cervical back: Normal range of motion and neck supple.      Right lower leg: No edema.      Left lower leg: No edema.   Skin:     General: Skin is warm and dry.   Neurological:      Mental Status: She is alert and oriented to person, place, and time.   Psychiatric:         Mood and Affect: Mood normal.         Behavior: Behavior normal.         Thought Content: Thought content normal.         Judgment: Judgment normal.       Administrative Statements "

## 2024-07-05 NOTE — ASSESSMENT & PLAN NOTE
Lab Results   Component Value Date    EGFR 44 06/27/2024    EGFR 49 11/22/2023    EGFR 39 10/13/2023    CREATININE 1.18 06/27/2024    CREATININE 1.08 11/22/2023    CREATININE 1.31 (H) 10/13/2023   Pt to avoid NSAIDs and any IV dyes. Patient to follow up eoither with nephrology or  with us for  further  monitoring of  renal function.

## 2024-07-31 DIAGNOSIS — J01.10 SUBACUTE FRONTAL SINUSITIS: ICD-10-CM

## 2024-08-01 DIAGNOSIS — I48.20 CHRONIC A-FIB (HCC): ICD-10-CM

## 2024-08-01 RX ORDER — BENZONATATE 100 MG/1
CAPSULE ORAL
Qty: 60 CAPSULE | Refills: 1 | Status: SHIPPED | OUTPATIENT
Start: 2024-08-01

## 2024-08-02 RX ORDER — APIXABAN 5 MG/1
TABLET, FILM COATED ORAL
Qty: 180 TABLET | Refills: 3 | Status: SHIPPED | OUTPATIENT
Start: 2024-08-02

## 2024-08-04 RX ORDER — DIGOXIN 125 MCG
TABLET ORAL
Qty: 90 TABLET | Refills: 3 | Status: SHIPPED | OUTPATIENT
Start: 2024-08-04

## 2024-08-29 DIAGNOSIS — R10.9 ABDOMINAL PAIN, UNSPECIFIED ABDOMINAL LOCATION: ICD-10-CM

## 2024-08-29 DIAGNOSIS — N32.81 OAB (OVERACTIVE BLADDER): ICD-10-CM

## 2024-08-29 RX ORDER — OXYBUTYNIN CHLORIDE 10 MG/1
20 TABLET, EXTENDED RELEASE ORAL
Qty: 180 TABLET | Refills: 1 | Status: SHIPPED | OUTPATIENT
Start: 2024-08-29

## 2024-08-29 RX ORDER — PANTOPRAZOLE SODIUM 40 MG/1
TABLET, DELAYED RELEASE ORAL
Qty: 180 TABLET | Refills: 1 | Status: SHIPPED | OUTPATIENT
Start: 2024-08-29

## 2024-09-28 DIAGNOSIS — J01.10 SUBACUTE FRONTAL SINUSITIS: ICD-10-CM

## 2024-09-28 RX ORDER — BENZONATATE 100 MG/1
CAPSULE ORAL
Qty: 60 CAPSULE | Refills: 0 | Status: SHIPPED | OUTPATIENT
Start: 2024-09-28

## 2024-10-18 DIAGNOSIS — J01.10 SUBACUTE FRONTAL SINUSITIS: ICD-10-CM

## 2024-10-18 DIAGNOSIS — R11.2 NAUSEA AND VOMITING: ICD-10-CM

## 2024-10-18 RX ORDER — DICYCLOMINE HYDROCHLORIDE 10 MG/1
CAPSULE ORAL
Qty: 360 CAPSULE | Refills: 1 | Status: SHIPPED | OUTPATIENT
Start: 2024-10-18

## 2024-10-18 RX ORDER — BENZONATATE 100 MG/1
CAPSULE ORAL
Qty: 60 CAPSULE | Refills: 0 | Status: SHIPPED | OUTPATIENT
Start: 2024-10-18 | End: 2024-10-24

## 2024-10-24 DIAGNOSIS — I10 HYPERTENSION, ESSENTIAL: ICD-10-CM

## 2024-10-24 DIAGNOSIS — J01.10 SUBACUTE FRONTAL SINUSITIS: ICD-10-CM

## 2024-10-24 RX ORDER — BENZONATATE 100 MG/1
CAPSULE ORAL
Qty: 60 CAPSULE | Refills: 0 | Status: SHIPPED | OUTPATIENT
Start: 2024-10-24

## 2024-10-25 ENCOUNTER — RA CDI HCC (OUTPATIENT)
Dept: OTHER | Facility: HOSPITAL | Age: 79
End: 2024-10-25

## 2024-10-28 ENCOUNTER — APPOINTMENT (OUTPATIENT)
Dept: LAB | Facility: CLINIC | Age: 79
End: 2024-10-28
Payer: MEDICARE

## 2024-10-28 DIAGNOSIS — E11.51 TYPE 2 DIABETES MELLITUS WITH DIABETIC PERIPHERAL ANGIOPATHY WITHOUT GANGRENE, WITHOUT LONG-TERM CURRENT USE OF INSULIN (HCC): ICD-10-CM

## 2024-10-28 LAB
ALBUMIN SERPL BCG-MCNC: 4.4 G/DL (ref 3.5–5)
ALP SERPL-CCNC: 75 U/L (ref 34–104)
ALT SERPL W P-5'-P-CCNC: 10 U/L (ref 7–52)
ANION GAP SERPL CALCULATED.3IONS-SCNC: 6 MMOL/L (ref 4–13)
AST SERPL W P-5'-P-CCNC: 15 U/L (ref 13–39)
BILIRUB SERPL-MCNC: 0.65 MG/DL (ref 0.2–1)
BUN SERPL-MCNC: 21 MG/DL (ref 5–25)
CALCIUM SERPL-MCNC: 9.5 MG/DL (ref 8.4–10.2)
CHLORIDE SERPL-SCNC: 102 MMOL/L (ref 96–108)
CO2 SERPL-SCNC: 30 MMOL/L (ref 21–32)
CREAT SERPL-MCNC: 1.16 MG/DL (ref 0.6–1.3)
CREAT UR-MCNC: 160.9 MG/DL
EST. AVERAGE GLUCOSE BLD GHB EST-MCNC: 171 MG/DL
GFR SERPL CREATININE-BSD FRML MDRD: 45 ML/MIN/1.73SQ M
GLUCOSE P FAST SERPL-MCNC: 151 MG/DL (ref 65–99)
HBA1C MFR BLD: 7.6 %
MICROALBUMIN UR-MCNC: 15.9 MG/L
MICROALBUMIN/CREAT 24H UR: 10 MG/G CREATININE (ref 0–30)
POTASSIUM SERPL-SCNC: 4.7 MMOL/L (ref 3.5–5.3)
PROT SERPL-MCNC: 7.3 G/DL (ref 6.4–8.4)
SODIUM SERPL-SCNC: 138 MMOL/L (ref 135–147)

## 2024-10-28 PROCEDURE — 82043 UR ALBUMIN QUANTITATIVE: CPT

## 2024-10-28 PROCEDURE — 80053 COMPREHEN METABOLIC PANEL: CPT

## 2024-10-28 PROCEDURE — 82570 ASSAY OF URINE CREATININE: CPT

## 2024-10-28 PROCEDURE — 83036 HEMOGLOBIN GLYCOSYLATED A1C: CPT

## 2024-10-28 PROCEDURE — 36415 COLL VENOUS BLD VENIPUNCTURE: CPT

## 2024-10-28 NOTE — TELEPHONE ENCOUNTER
Name from pharmacy: LISINOPRIL 2.5 MG TABLET         Will file in chart as: lisinopril (ZESTRIL) 2.5 mg tablet    Sig: TAKE 1 TABLET BY MOUTH EVERY DAY    Disp: 90 tablet    Refills: 1    Start: 10/24/2024    Class: Normal    Non-formulary For: Hypertension, essential    Last ordered: 6 months ago (4/25/2024) by Dao Humphrey PA-C    Last refill: 10/18/2024    Rx #: [id number]    Cardiovascular:  ACE Inhibitors Wbatcb81/24/2024 11:55 AM   Protocol Details eGFR is 60 or above and within 360 days    BP completed in the last 6 months    K is 5.3 or below and within 360 days    Valid encounter within last 6 months      To be filled at: [facility]/pharmacy #[id number] - [place], [place] - [address]        Message was sent to Clerical to schedule pt for a follow up appt.  Thanks!  
Appt scheduled 3/27 at [facility]   
Attempted to LM for pt to call back for scheduling but when the call connected to the VM it asked for a mailbox number. Unable to LM for patient to call back regarding scheduling  
Please schedule pt for a follow up appt.    Thanks!  
PCP:    REQUESTING PHYSICIAN:    REASON FOR CONSULT:    CHIEF COMPLAINT:    HPI:  47 y/o M with PMH Type 2 DM, HTN, hypothyroidism, asthma, ADHD, bicuspid aortic valve, with admission in 2021 for benzo/cocaine overdose with subsequent aspiration pneumonia requiring intubation with subsequent anoxic brain injury and ECHO showing LVEF <20% and MRI with acute infarcts and hypoxic ischemic encephalopathy presents with chest pain. The pt violated a restraining order today and had an altercation with the police. He then started having chest pain in the center of his chest with radiation down the left arm. Pain 7/10 in intensity, described as sharp, tingling sensation in the arm, pain is steady/constant, worse with breathing. Reports SOB, sweats, blurry vision. Denies fevers, chills, abdominal pain, N/V, diarrhea/constipation. He was previously on medications but stopped taking all medications. Has not taken anything since 8/2021. Pt reports last cocaine use 3 days ago. Last THC use 3 days ago. Used [employer] today in front of the police.     Prior admissions:   - 9/23/2021: unresponsive secondary to benzo/cocaine overdose -> aspiration PNA -> intubated and on pressors -> anoxic brain injury, ECHO LVEF < 20% -> pt extubated, PETE with bicuspid aortic valve, MRI brain acute infarcts, hypoxic ischemic encephalopathy     ER course: HR . Labs: troponin 97.97 -> 101.70, glucose 122. UA: trace ketones, trace leukocyte esterase, trace blood, few bacteria. UTox: positive for THC, positive for cocaine. EKG: Sinus tachycardia with  bpm, QTC prolonged 487 ms, LVH, no ST segment changes, no T wave inversions (personally reviewed). CXR: no consolidation, no effusion, no pneumothorax (personally reviewed).     Cardiology called to evaluate symptoms. Pt reports that he underwent cardiac cath in the past but he was in a "coma" and didn't remember results or follow up. Pt reports that his symptoms occur with exertion and at rest. Pt has not followed up with a cardiologist in the past.       PAST MEDICAL & SURGICAL HISTORY:  Asthma      HTN (hypertension)      DM (diabetes mellitus)  type 2      Hypothyroidism      ADHD      Benzodiazepine overdose      Opiate overdose      Pneumonia, aspiration      History of anoxic brain injury  8/2021 benzo/cocaine overdose with subsequent aspiration pneumonia requiring intubation with subsequent anoxic brain injury and ECHO showing LVEF t;20% and MRI with acute infarcts and hypoxic ischemic encephalopathy      Bicuspid aortic valve      S/P elbow joint replacement  right elbow surgery.      S/P knee surgery  right thigh surgery.          Allergies    No Known Allergies    Intolerances        SOCIAL HISTORY:    FAMILY HISTORY:  FH: asthma (Mother)        MEDICATIONS:  MEDICATIONS  (STANDING):  aspirin  chewable 81 milliGRAM(s) Oral daily  atorvastatin 40 milliGRAM(s) Oral at bedtime  clopidogrel Tablet 300 milliGRAM(s) Oral once  dextrose 5%. 1000 milliLiter(s) (50 mL/Hr) IV Continuous <Continuous>  dextrose 5%. 1000 milliLiter(s) (100 mL/Hr) IV Continuous <Continuous>  dextrose 50% Injectable 25 Gram(s) IV Push once  dextrose 50% Injectable 12.5 Gram(s) IV Push once  dextrose 50% Injectable 25 Gram(s) IV Push once  glucagon  Injectable 1 milliGRAM(s) IntraMuscular once  heparin   Injectable 5000 Unit(s) IV Push once  heparin  Infusion.  Unit(s)/Hr (10 mL/Hr) IV Continuous <Continuous>  influenza   Vaccine 0.5 milliLiter(s) IntraMuscular once  insulin lispro (ADMELOG) corrective regimen sliding scale   SubCutaneous three times a day before meals  insulin lispro (ADMELOG) corrective regimen sliding scale   SubCutaneous at bedtime    MEDICATIONS  (PRN):  acetaminophen     Tablet .. 650 milliGRAM(s) Oral every 6 hours PRN Temp greater or equal to 38C (100.4F), Mild Pain (1 - 3)  aluminum hydroxide/magnesium hydroxide/simethicone Suspension 30 milliLiter(s) Oral every 4 hours PRN Dyspepsia  dextrose Oral Gel 15 Gram(s) Oral once PRN Blood Glucose LESS THAN 70 milliGRAM(s)/deciliter  melatonin 3 milliGRAM(s) Oral at bedtime PRN Insomnia  nitroglycerin     SubLingual 0.4 milliGRAM(s) SubLingual once PRN Chest Pain  ondansetron Injectable 4 milliGRAM(s) IV Push every 8 hours PRN Nausea and/or Vomiting      REVIEW OF SYSTEMS:    CONSTITUTIONAL: No weakness, fevers or chills  EYES/ENT: No visual changes;  No vertigo or throat pain   NECK: No pain or stiffness  RESPIRATORY: No cough, wheezing, hemoptysis; No shortness of breath  CARDIOVASCULAR: Chest pain  GASTROINTESTINAL: No abdominal or epigastric pain. No nausea, vomiting, or hematemesis; No diarrhea or constipation. No melena or hematochezia.  GENITOURINARY: No dysuria, frequency or hematuria  NEUROLOGICAL: No numbness or weakness  SKIN: No itching, burning, rashes, or lesions   All other review of systems is negative unless indicated above    Vital Signs Last 24 Hrs  T(C): 36.7 (29 Jun 2022 16:00), Max: 37.1 (28 Jun 2022 21:30)  T(F): 98 (29 Jun 2022 16:00), Max: 98.8 (28 Jun 2022 21:30)  HR: 96 (29 Jun 2022 16:00) (75 - 100)  BP: 116/87 (29 Jun 2022 16:00) (116/87 - 132/98)  BP(mean): 94 (29 Jun 2022 02:11) (94 - 99)  RR: 16 (29 Jun 2022 16:00) (16 - 19)  SpO2: 98% (29 Jun 2022 16:00) (97% - 99%)    I&O's Summary      PHYSICAL EXAM:    Constitutional: NAD, awake and alert, well-developed  HEENT: PERR, EOMI,  No oral cyananosis.  Neck:  supple,  No JVD  Respiratory: Breath sounds are clear bilaterally, No wheezing, rales or rhonchi  Cardiovascular: S1 and S2, regular rate and rhythm,1/6 CHELSEY  Gastrointestinal: Bowel Sounds present, soft, nontender.   Extremities: No peripheral edema. No clubbing or cyanosis.  Vascular: 2+ peripheral pulses  Neurological: A/O x 3, no focal deficits  Musculoskeletal: no calf tenderness.  Skin: No rashes.      LABS: All Labs Reviewed:                        16.1   7.62  )-----------( 245      ( 29 Jun 2022 07:19 )             47.0                         16.9   10.39 )-----------( 253      ( 28 Jun 2022 16:16 )             49.1     29 Jun 2022 07:19    141    |  111    |  18     ----------------------------<  96     3.9     |  26     |  1.11   28 Jun 2022 16:16    144    |  114    |  16     ----------------------------<  122    3.9     |  26     |  1.19     Ca    8.9        29 Jun 2022 07:19  Ca    9.1        28 Jun 2022 16:16    TPro  6.9    /  Alb  3.6    /  TBili  0.3    /  DBili  x      /  AST  16     /  ALT  21     /  AlkPhos  57     28 Jun 2022 16:16    PT/INR - ( 28 Jun 2022 16:16 )   PT: 12.0 sec;   INR: 1.03 ratio         PTT - ( 28 Jun 2022 16:16 )  PTT:25.8 sec  CARDIAC MARKERS ( 28 Jun 2022 16:16 )  x     / x     / 99 U/L / x     / x          Blood Culture:     06-29 @ 07:19  TSH: 1.33      RADIOLOGY/EKG: NSR non sp st t changes

## 2024-10-29 RX ORDER — LISINOPRIL 2.5 MG/1
2.5 TABLET ORAL DAILY
Qty: 90 TABLET | Refills: 1 | Status: SHIPPED | OUTPATIENT
Start: 2024-10-29

## 2024-11-03 NOTE — ASSESSMENT & PLAN NOTE
Patient is stable with current meds and discussed a low carb diet. Pt  recommended to see eye doctor and foot doctor for routine screening as per protocol. Recheck A1C  and Cr in 3 months. Patient questions answered today about this condtion.   Lab Results   Component Value Date    HGBA1C 7.6 (H) 10/28/2024       Orders:    Hemoglobin A1C; Future    Comprehensive metabolic panel; Future

## 2024-11-03 NOTE — PROGRESS NOTES
Ambulatory Visit  Name: Hannah Marinelli      : 1945      MRN: [id number]  Encounter Provider: Arik Vasquez MD  Encounter Date: 2024   Encounter department: [facility]    Assessment & Plan  Age-related osteoporosis without current pathological fracture  Patient to continue with weight bearing exercises as much as possible and oral intake of 1200 mg of calcium with 800 IU of Vit D to maximize bone protection. Pt  to continue  with DEXA scan every 2 years to reassess. All qustions regarding this problem answered today to patient satisfaction.          Anxiety  Patient to continue utilizing medical therapy as well as counseling sources as applicable to condition. If suicidal thought or fear of suicide attempt, to call 911 and seek help immediately. Medications and therapy reviewed today and all patient  questions answered today.          Atrial fibrillation, unspecified type (HCC)  Continue with anticogulation and rate control of heart rate. Concerns about condition were addressed today.          Mixed hyperlipidemia  Patient  is stable with current medication and we discussed a low fat low cholesterol diet. Weight loss also discussed for this will help lower cholesterol also. Recheck lipids in 6 months.     Orders:    Lipid Panel with Direct LDL reflex; Future    Primary hypertension         Hypothyroidism due to acquired atrophy of thyroid  Patient to continue current dose of thyroid medicine and recheck TSH in 6 months     Orders:    TSH, 3rd generation with Free T4 reflex; Future    Stage 3a chronic kidney disease (HCC)  Lab Results   Component Value Date    EGFR 45 10/28/2024    EGFR 44 2024    EGFR 49 2023    CREATININE 1.16 10/28/2024    CREATININE 1.18 2024    CREATININE 1.08 2023   Pt to avoid NSAIDs and any IV dyes. Patient to follow up eoither with nephrology or  with us for  further  monitoring of  renal function.          Type 2 diabetes  mellitus with diabetic peripheral angiopathy without gangrene, without long-term current use of insulin (HCC)  Patient is stable with current meds and discussed a low carb diet. Pt  recommended to see eye doctor and foot doctor for routine screening as per protocol. Recheck A1C  and Cr in 3 months. Patient questions answered today about this condtion.   Lab Results   Component Value Date    HGBA1C 7.6 (H) 10/28/2024       Orders:    Hemoglobin A1C; Future    Comprehensive metabolic panel; Future      Falls Plan of Care: balance, strength, and gait training instructions were provided. Home safety education provided.     Urinary Incontinence Plan of Care: counseling topics discussed: practice Kegel (pelvic floor strengthening) exercises, use restroom every 2 hours, limit alcohol, caffeine, spicy foods, and acidic foods, limiting fluid intake 3-4 hours before bed, weight loss, preventing constipation and limiting fluid intake to 60 oz. per day.     History of Present Illness     79-year-old female today for checkup on multimedical problems patient with type 2 diabetes also history atrial fibrillation hypothyroidism hyperlipidemia anxiety history of some depression and is doing quite well.  Patient with a decrease in her A1c down to 7.5 and is doing much better with her medicines patient is now taking her metformin and is taking the medicine and has very good results with her A1c.  Discussed with patient about the importance of seeing a foot doctor and she does see a Dr. Curtis.  She is also due for Prolia shot which we will see about getting it done today for her for osteoporosis.  Patient does not aware of needing any refills on her medicines and she will call when she needs those refills otherwise we will see her back in approximately 3 months discussed with patient about a flu shot and she declines today.          Review of Systems   Constitutional:  Negative for activity change, appetite change, fatigue and  fever.   HENT:  Negative for congestion, ear pain, postnasal drip, rhinorrhea, sinus pressure, sinus pain, sneezing and sore throat.    Eyes:  Negative for pain and redness.   Respiratory:  Negative for apnea, cough, chest tightness, shortness of breath and wheezing.    Cardiovascular:  Negative for chest pain, palpitations and leg swelling.   Gastrointestinal:  Negative for abdominal pain, constipation, diarrhea, nausea and vomiting.   Endocrine: Negative for cold intolerance and heat intolerance.   Genitourinary:  Negative for difficulty urinating, dysuria, frequency, hematuria and urgency.   Musculoskeletal:  Negative for arthralgias, back pain, gait problem and myalgias.   Skin:  Negative for rash.   Neurological:  Negative for dizziness, speech difficulty, weakness, numbness and headaches.   Hematological:  Does not bruise/bleed easily.   Psychiatric/Behavioral:  Negative for agitation, confusion and hallucinations.          Patient's shoes and socks removed.    Right Foot/Ankle   Right Foot Inspection  Skin Exam: skin normal. Skin not intact, no dry skin, no warmth, no callus, no erythema, no maceration, no abnormal color, no pre-ulcer, no ulcer and no callus.     Toe Exam: ROM and strength within normal limits. No tenderness    Sensory   Vibration: intact  Proprioception: intact  Monofilament testing: intact    Vascular  Capillary refills: < 3 seconds  The right DP pulse is 2+. The right PT pulse is 2+.     Left Foot/Ankle  Left Foot Inspection  Skin Exam: skin normal. Skin not intact, no dry skin, no warmth, no erythema, no maceration, normal color, no pre-ulcer, no ulcer and no callus.     Toe Exam: ROM and strength within normal limits. No swelling and no tenderness.     Sensory   Vibration: intact  Proprioception: intact  Monofilament testing: intact    Vascular  Capillary refills: < 3 seconds  The left DP pulse is 2+. The left PT pulse is 2+.     Assign Risk Category  No deformity present  No loss of  protective sensation  No weak pulses  Risk: 0     Objective     There were no vitals taken for this visit.    Physical Exam  Constitutional:       Appearance: Normal appearance. She is not ill-appearing.   HENT:      Head: Normocephalic and atraumatic.      Right Ear: Tympanic membrane normal.      Left Ear: Tympanic membrane normal.      Nose: Nose normal.      Mouth/Throat:      Mouth: Mucous membranes are moist.   Eyes:      Extraocular Movements: Extraocular movements intact.      Conjunctiva/sclera: Conjunctivae normal.      Pupils: Pupils are equal, round, and reactive to light.   Cardiovascular:      Rate and Rhythm: Normal rate and regular rhythm.      Pulses: no weak pulses.           Dorsalis pedis pulses are 2+ on the right side and 2+ on the left side.        Posterior tibial pulses are 2+ on the right side and 2+ on the left side.   Pulmonary:      Effort: Pulmonary effort is normal. No respiratory distress.      Breath sounds: Normal breath sounds. No wheezing.   Abdominal:      General: Bowel sounds are normal.      Palpations: Abdomen is soft.      Tenderness: There is no abdominal tenderness.   Musculoskeletal:         General: No tenderness. Normal range of motion.      Cervical back: Normal range of motion and neck supple.      Right lower leg: No edema.      Left lower leg: No edema.   Feet:      Right foot:      Skin integrity: No ulcer, skin breakdown, erythema, warmth, callus or dry skin.      Left foot:      Skin integrity: No ulcer, skin breakdown, erythema, warmth, callus or dry skin.   Skin:     General: Skin is warm and dry.   Neurological:      Mental Status: She is alert and oriented to person, place, and time.   Psychiatric:         Mood and Affect: Mood normal.         Behavior: Behavior normal.         Thought Content: Thought content normal.         Judgment: Judgment normal.

## 2024-11-03 NOTE — ASSESSMENT & PLAN NOTE
Lab Results   Component Value Date    EGFR 45 10/28/2024    EGFR 44 06/27/2024    EGFR 49 11/22/2023    CREATININE 1.16 10/28/2024    CREATININE 1.18 06/27/2024    CREATININE 1.08 11/22/2023   Pt to avoid NSAIDs and any IV dyes. Patient to follow up eoither with nephrology or  with us for  further  monitoring of  renal function.

## 2024-11-03 NOTE — ASSESSMENT & PLAN NOTE
Patient  is stable with current medication and we discussed a low fat low cholesterol diet. Weight loss also discussed for this will help lower cholesterol also. Recheck lipids in 6 months.     Orders:    Lipid Panel with Direct LDL reflex; Future

## 2024-11-03 NOTE — ASSESSMENT & PLAN NOTE
Patient to continue current dose of thyroid medicine and recheck TSH in 6 months     Orders:    TSH, 3rd generation with Free T4 reflex; Future

## 2024-11-04 ENCOUNTER — OFFICE VISIT (OUTPATIENT)
Dept: FAMILY MEDICINE CLINIC | Facility: CLINIC | Age: 79
End: 2024-11-04
Payer: MEDICARE

## 2024-11-04 VITALS
DIASTOLIC BLOOD PRESSURE: 82 MMHG | WEIGHT: 113 LBS | BODY MASS INDEX: 19.29 KG/M2 | SYSTOLIC BLOOD PRESSURE: 140 MMHG | HEIGHT: 64 IN | TEMPERATURE: 97.3 F

## 2024-11-04 DIAGNOSIS — N18.31 STAGE 3A CHRONIC KIDNEY DISEASE (HCC): ICD-10-CM

## 2024-11-04 DIAGNOSIS — I48.91 ATRIAL FIBRILLATION, UNSPECIFIED TYPE (HCC): ICD-10-CM

## 2024-11-04 DIAGNOSIS — E11.51 TYPE 2 DIABETES MELLITUS WITH DIABETIC PERIPHERAL ANGIOPATHY WITHOUT GANGRENE, WITHOUT LONG-TERM CURRENT USE OF INSULIN (HCC): ICD-10-CM

## 2024-11-04 DIAGNOSIS — E03.4 HYPOTHYROIDISM DUE TO ACQUIRED ATROPHY OF THYROID: ICD-10-CM

## 2024-11-04 DIAGNOSIS — E78.2 MIXED HYPERLIPIDEMIA: ICD-10-CM

## 2024-11-04 DIAGNOSIS — I10 PRIMARY HYPERTENSION: ICD-10-CM

## 2024-11-04 DIAGNOSIS — F41.9 ANXIETY: ICD-10-CM

## 2024-11-04 DIAGNOSIS — M81.0 AGE-RELATED OSTEOPOROSIS WITHOUT CURRENT PATHOLOGICAL FRACTURE: Primary | ICD-10-CM

## 2024-11-04 PROCEDURE — G2211 COMPLEX E/M VISIT ADD ON: HCPCS | Performed by: FAMILY MEDICINE

## 2024-11-04 PROCEDURE — 99214 OFFICE O/P EST MOD 30 MIN: CPT | Performed by: FAMILY MEDICINE

## 2024-12-10 DIAGNOSIS — E03.9 HYPOTHYROIDISM, UNSPECIFIED TYPE: ICD-10-CM

## 2024-12-10 RX ORDER — LEVOTHYROXINE SODIUM 75 UG/1
75 TABLET ORAL
Qty: 90 TABLET | Refills: 1 | Status: SHIPPED | OUTPATIENT
Start: 2024-12-10

## 2024-12-15 DIAGNOSIS — J30.1 ALLERGIC RHINITIS DUE TO POLLEN, UNSPECIFIED SEASONALITY: ICD-10-CM

## 2024-12-15 DIAGNOSIS — J01.10 SUBACUTE FRONTAL SINUSITIS: ICD-10-CM

## 2024-12-15 DIAGNOSIS — E11.9 TYPE 2 DIABETES MELLITUS WITHOUT COMPLICATION, WITHOUT LONG-TERM CURRENT USE OF INSULIN (HCC): ICD-10-CM

## 2024-12-15 DIAGNOSIS — M62.838 MUSCLE SPASM: ICD-10-CM

## 2024-12-17 RX ORDER — CYCLOBENZAPRINE HCL 10 MG
10 TABLET ORAL 3 TIMES DAILY PRN
Qty: 270 TABLET | Refills: 1 | Status: SHIPPED | OUTPATIENT
Start: 2024-12-17

## 2024-12-17 RX ORDER — SITAGLIPTIN 100 MG/1
100 TABLET, FILM COATED ORAL DAILY
Qty: 90 TABLET | Refills: 1 | Status: SHIPPED | OUTPATIENT
Start: 2024-12-17

## 2024-12-17 RX ORDER — LEVOCETIRIZINE DIHYDROCHLORIDE 5 MG/1
5 TABLET, FILM COATED ORAL EVERY EVENING
Qty: 90 TABLET | Refills: 1 | Status: SHIPPED | OUTPATIENT
Start: 2024-12-17

## 2024-12-17 RX ORDER — BENZONATATE 100 MG/1
CAPSULE ORAL
Qty: 60 CAPSULE | Refills: 0 | Status: SHIPPED | OUTPATIENT
Start: 2024-12-17

## 2025-01-09 DIAGNOSIS — E78.2 MIXED HYPERLIPIDEMIA: ICD-10-CM

## 2025-01-09 DIAGNOSIS — I48.20 CHRONIC A-FIB (HCC): ICD-10-CM

## 2025-01-10 RX ORDER — METOPROLOL TARTRATE 50 MG
50 TABLET ORAL EVERY 12 HOURS
Qty: 180 TABLET | Refills: 1 | Status: SHIPPED | OUTPATIENT
Start: 2025-01-10

## 2025-01-10 RX ORDER — EZETIMIBE AND SIMVASTATIN 10; 20 MG/1; MG/1
1 TABLET ORAL
Qty: 30 TABLET | Refills: 0 | Status: SHIPPED | OUTPATIENT
Start: 2025-01-10

## 2025-01-19 DIAGNOSIS — L65.9 ALOPECIA: ICD-10-CM

## 2025-01-20 RX ORDER — SPIRONOLACTONE 25 MG/1
25 TABLET ORAL DAILY
Qty: 90 TABLET | Refills: 1 | Status: SHIPPED | OUTPATIENT
Start: 2025-01-20

## 2025-01-27 ENCOUNTER — TELEPHONE (OUTPATIENT)
Age: 80
End: 2025-01-27

## 2025-01-27 NOTE — TELEPHONE ENCOUNTER
Pt called in after a letter from her insurance company stating they will no longer cover her Januvia medication. If she wants to continue taking it, the medication will require a prior auth. They also let her know saxagliptin, ZITUVIo would be cover as an alternative.     Hannah wanted to know your thoughts. She states the Januvia works well for her.     Please advise, thank you

## 2025-01-28 NOTE — TELEPHONE ENCOUNTER
Call pt. I have no problem changing her to saxagliptin from Moiseuvia if she desires. Have her let me know

## 2025-01-29 DIAGNOSIS — E11.9 TYPE 2 DIABETES MELLITUS WITHOUT COMPLICATION, WITHOUT LONG-TERM CURRENT USE OF INSULIN (HCC): Primary | ICD-10-CM

## 2025-01-29 RX ORDER — SAXAGLIPTIN 5 MG/1
5 TABLET, FILM COATED ORAL DAILY
Qty: 90 TABLET | Refills: 1 | Status: SHIPPED | OUTPATIENT
Start: 2025-01-29 | End: 2026-01-24

## 2025-01-29 NOTE — TELEPHONE ENCOUNTER
Spoke to patient and she is wary but ok to switch to the saxagliptin and if she has any new side effects after starting the new medication she will let us know right away otherwise will see us in March.

## 2025-01-31 DIAGNOSIS — E78.2 MIXED HYPERLIPIDEMIA: ICD-10-CM

## 2025-01-31 DIAGNOSIS — J01.10 SUBACUTE FRONTAL SINUSITIS: ICD-10-CM

## 2025-01-31 RX ORDER — BENZONATATE 100 MG/1
CAPSULE ORAL
Qty: 60 CAPSULE | Refills: 0 | Status: SHIPPED | OUTPATIENT
Start: 2025-01-31

## 2025-01-31 RX ORDER — EZETIMIBE AND SIMVASTATIN 10; 20 MG/1; MG/1
1 TABLET ORAL
Qty: 30 TABLET | Refills: 0 | Status: SHIPPED | OUTPATIENT
Start: 2025-01-31

## 2025-02-24 ENCOUNTER — TELEPHONE (OUTPATIENT)
Age: 80
End: 2025-02-24

## 2025-02-24 NOTE — TELEPHONE ENCOUNTER
Pt called in stating her diabetes meds were changed and she not comfortable with the new prescription and the and side effects. She says she called her insurance company but the doctor has to fill out the forms first. She says the insurance company is faxing the forms to the office. She says she would like to stay on Januvia.

## 2025-03-03 DIAGNOSIS — E11.9 TYPE 2 DIABETES MELLITUS WITHOUT COMPLICATION, WITHOUT LONG-TERM CURRENT USE OF INSULIN (HCC): ICD-10-CM

## 2025-03-03 RX ORDER — SITAGLIPTIN 100 MG/1
100 TABLET, FILM COATED ORAL DAILY
Qty: 90 TABLET | Refills: 1 | Status: SHIPPED | OUTPATIENT
Start: 2025-03-03

## 2025-03-03 NOTE — TELEPHONE ENCOUNTER
PA Januvia 100 MG DENIED    Reason:(Screenshot if applicable)        Message sent to office clinical pool Yes    Denial letter scanned into Media Yes    Appeal started No (Provider will need to decide if appeal is warranted and send clinical documentation to Prior Authorization Team for initiation.)    **Please follow up with your patient regarding denial and next steps**

## 2025-03-03 NOTE — TELEPHONE ENCOUNTER
Patient is aware that she will not have any coverage for Januvia and will probably pay cash for it.  The alternatives that she did not want to take and only once Januvia.  Please let patient know that her Januvia has been written and has been denied by her insurance.

## 2025-03-03 NOTE — TELEPHONE ENCOUNTER
I have ordered the Januvia and we will see what happens with the prior authorization when it gets denied.

## 2025-03-03 NOTE — TELEPHONE ENCOUNTER
Please advise   Patient called . Asking if the PA was started for her Januvia?  She is out of the medication now. Should she start the new one ( Onglyza) since she picked it up although she has some reservations with it. Reviewed chart and do not see a PA was submitted from this encounter. Patient provided a contact number for  the PA to be done over the phone with Jaidenna if Dr Vasquez wants to pursue. [phone] .  Also asking for future Rxs to all be done with 90 day supply, cheaper for her.

## 2025-03-03 NOTE — TELEPHONE ENCOUNTER
PA Januvia 100 MG SUBMITTED     to [employer]     via    []CMM-KEY:    [x]Surescripts-Case ID # [id number]  []Availity-Auth ID #  NDC #    []Faxed to plan   []Other website    []Phone call Case ID #      []PA sent as URGENT    All office notes, labs and other pertaining documents and studies sent. Clinical questions answered. Awaiting determination from insurance company.     Turnaround time for your insurance to make a decision on your Prior Authorization can take 7-21 business days.

## 2025-03-04 DIAGNOSIS — E11.9 TYPE 2 DIABETES MELLITUS WITHOUT COMPLICATION, WITHOUT LONG-TERM CURRENT USE OF INSULIN (HCC): Primary | ICD-10-CM

## 2025-03-04 RX ORDER — LINAGLIPTIN 5 MG/1
5 TABLET, FILM COATED ORAL DAILY
Qty: 30 TABLET | Refills: 5 | Status: SHIPPED | OUTPATIENT
Start: 2025-03-04

## 2025-03-04 NOTE — TELEPHONE ENCOUNTER
No problem. I will call in tradjenta for her and see if that is acceptable to her and her insurance coverage.

## 2025-03-04 NOTE — TELEPHONE ENCOUNTER
Spoke with pt and she stated that she cannot afford the Januvia. Pt would like to know if there is any other medication that you can rx besides the ongylza. She does not feel comfortable with this medication?  Please review and advise.   Or can we do start appeal for the Januvia.

## 2025-03-05 ENCOUNTER — TELEPHONE (OUTPATIENT)
Age: 80
End: 2025-03-05

## 2025-03-05 NOTE — TELEPHONE ENCOUNTER
PA for linagliptin 5 mg SUBMITTED to [employer]    via    []CMM-KEY:   [x]Surescripts-Case ID # [id number]   []Availity-Auth ID # NDC #   []Faxed to plan   []Other website   []Phone call Case ID #     []PA sent as URGENT    All office notes, labs and other pertaining documents and studies sent. Clinical questions answered. Awaiting determination from insurance company.     Turnaround time for your insurance to make a decision on your Prior Authorization can take 7-21 business days.

## 2025-03-10 NOTE — TELEPHONE ENCOUNTER
Pt received a letter from insurance company that tradjenda was denied.  She is requesting office contact her insurance, they told her there are a few options that are covered but would not give her that info.     Michael phone number for provider to call [phone]

## 2025-03-11 NOTE — TELEPHONE ENCOUNTER
PA for linagliptin 5 mg  DENIED    Reason:(Screenshot if applicable)        Message sent to office clinical pool Yes    Denial letter scanned into Media Yes    Appeal started No (Provider will need to decide if appeal is warranted and send clinical documentation to Prior Authorization Team for initiation.)    **Please follow up with your patient regarding denial and next steps**

## 2025-03-11 NOTE — TELEPHONE ENCOUNTER
Call to [facility] [employer] [facility].  They stated that the pt must try and fail the Saxagliptin and Zituvio before they will cover the Januvia. She advised that you could abarca a letter of Medical Necessity as to why the pt can not take these medications.

## 2025-03-11 NOTE — TELEPHONE ENCOUNTER
Call to [facility] [employer] [facility].  They stated that the pt must try and fail the Saxagliptin and Zituvio before they will cover the Januvia. She advised that you could abarca a letter of Medical Necessity as to why the pt can not take these medications.

## 2025-03-12 DIAGNOSIS — E11.9 TYPE 2 DIABETES MELLITUS WITHOUT COMPLICATION, WITHOUT LONG-TERM CURRENT USE OF INSULIN (HCC): Primary | ICD-10-CM

## 2025-03-12 RX ORDER — SITAGLIPTIN 100 MG/1
100 TABLET ORAL DAILY
Qty: 30 TABLET | Refills: 5 | Status: SHIPPED | OUTPATIENT
Start: 2025-03-12

## 2025-03-12 NOTE — TELEPHONE ENCOUNTER
Pt stated that she will not be paying cash for this medication it is to expensive. Can she try Zituvio?

## 2025-03-15 DIAGNOSIS — E78.2 MIXED HYPERLIPIDEMIA: ICD-10-CM

## 2025-03-17 RX ORDER — EZETIMIBE AND SIMVASTATIN 10; 20 MG/1; MG/1
1 TABLET ORAL
Qty: 90 TABLET | Refills: 1 | Status: SHIPPED | OUTPATIENT
Start: 2025-03-17

## 2025-03-26 ENCOUNTER — TELEPHONE (OUTPATIENT)
Age: 80
End: 2025-03-26

## 2025-03-26 DIAGNOSIS — E11.9 TYPE 2 DIABETES MELLITUS WITHOUT COMPLICATION, WITHOUT LONG-TERM CURRENT USE OF INSULIN (HCC): ICD-10-CM

## 2025-03-26 DIAGNOSIS — E11.9 TYPE 2 DIABETES MELLITUS WITHOUT COMPLICATION, WITHOUT LONG-TERM CURRENT USE OF INSULIN (HCC): Primary | ICD-10-CM

## 2025-03-26 RX ORDER — SAXAGLIPTIN 5 MG/1
5 TABLET, FILM COATED ORAL DAILY
Qty: 30 TABLET | Refills: 5 | Status: SHIPPED | OUTPATIENT
Start: 2025-03-26 | End: 2025-09-22

## 2025-03-26 NOTE — TELEPHONE ENCOUNTER
Patient called in and has decided she will like with  Saxagliptin, as previously discussed with you..

## 2025-03-27 RX ORDER — SITAGLIPTIN 100 MG/1
TABLET ORAL
Refills: 0 | OUTPATIENT
Start: 2025-03-27

## 2025-03-31 ENCOUNTER — TELEPHONE (OUTPATIENT)
Age: 80
End: 2025-03-31

## 2025-03-31 NOTE — TELEPHONE ENCOUNTER
Spoke with pt and made her aware that we are waiting for the approval for the Prolia to come back. If it is approved she can discuss receiving this at her next appointment 04/11/25

## 2025-03-31 NOTE — TELEPHONE ENCOUNTER
Patient calling to confirm she had labs for upcoming appointment on 4/11/25, advised they are ordered and fasting is required- and also to see if she needs a prolia injection - please advise

## 2025-04-08 ENCOUNTER — APPOINTMENT (OUTPATIENT)
Dept: LAB | Facility: CLINIC | Age: 80
End: 2025-04-08
Payer: MEDICARE

## 2025-04-08 DIAGNOSIS — E78.2 MIXED HYPERLIPIDEMIA: ICD-10-CM

## 2025-04-08 DIAGNOSIS — E11.51 TYPE 2 DIABETES MELLITUS WITH DIABETIC PERIPHERAL ANGIOPATHY WITHOUT GANGRENE, WITHOUT LONG-TERM CURRENT USE OF INSULIN (HCC): ICD-10-CM

## 2025-04-08 DIAGNOSIS — E03.4 HYPOTHYROIDISM DUE TO ACQUIRED ATROPHY OF THYROID: ICD-10-CM

## 2025-04-08 LAB
ALBUMIN SERPL BCG-MCNC: 4.9 G/DL (ref 3.5–5)
ALP SERPL-CCNC: 103 U/L (ref 34–104)
ALT SERPL W P-5'-P-CCNC: 14 U/L (ref 7–52)
ANION GAP SERPL CALCULATED.3IONS-SCNC: 14 MMOL/L (ref 4–13)
AST SERPL W P-5'-P-CCNC: 21 U/L (ref 13–39)
BILIRUB SERPL-MCNC: 0.72 MG/DL (ref 0.2–1)
BUN SERPL-MCNC: 26 MG/DL (ref 5–25)
CALCIUM SERPL-MCNC: 10 MG/DL (ref 8.4–10.2)
CHLORIDE SERPL-SCNC: 99 MMOL/L (ref 96–108)
CHOLEST SERPL-MCNC: 161 MG/DL (ref ?–200)
CO2 SERPL-SCNC: 26 MMOL/L (ref 21–32)
CREAT SERPL-MCNC: 1.21 MG/DL (ref 0.6–1.3)
EST. AVERAGE GLUCOSE BLD GHB EST-MCNC: 249 MG/DL
GFR SERPL CREATININE-BSD FRML MDRD: 42 ML/MIN/1.73SQ M
GLUCOSE P FAST SERPL-MCNC: 222 MG/DL (ref 65–99)
HBA1C MFR BLD: 10.3 %
HDLC SERPL-MCNC: 39 MG/DL
LDLC SERPL CALC-MCNC: 79 MG/DL (ref 0–100)
POTASSIUM SERPL-SCNC: 4.9 MMOL/L (ref 3.5–5.3)
PROT SERPL-MCNC: 8.1 G/DL (ref 6.4–8.4)
SODIUM SERPL-SCNC: 139 MMOL/L (ref 135–147)
TRIGL SERPL-MCNC: 214 MG/DL (ref ?–150)

## 2025-04-08 PROCEDURE — 80061 LIPID PANEL: CPT

## 2025-04-08 PROCEDURE — 36415 COLL VENOUS BLD VENIPUNCTURE: CPT

## 2025-04-08 PROCEDURE — 84439 ASSAY OF FREE THYROXINE: CPT

## 2025-04-08 PROCEDURE — 80053 COMPREHEN METABOLIC PANEL: CPT

## 2025-04-08 PROCEDURE — 83036 HEMOGLOBIN GLYCOSYLATED A1C: CPT

## 2025-04-08 PROCEDURE — 84443 ASSAY THYROID STIM HORMONE: CPT

## 2025-04-09 LAB
T4 FREE SERPL-MCNC: 1.06 NG/DL (ref 0.61–1.12)
TSH SERPL DL<=0.05 MIU/L-ACNC: 0.41 UIU/ML (ref 0.45–4.5)

## 2025-04-10 ENCOUNTER — RA CDI HCC (OUTPATIENT)
Dept: OTHER | Facility: HOSPITAL | Age: 80
End: 2025-04-10

## 2025-04-11 ENCOUNTER — OFFICE VISIT (OUTPATIENT)
Dept: FAMILY MEDICINE CLINIC | Facility: CLINIC | Age: 80
End: 2025-04-11
Payer: MEDICARE

## 2025-04-11 VITALS
HEART RATE: 52 BPM | BODY MASS INDEX: 19.63 KG/M2 | SYSTOLIC BLOOD PRESSURE: 120 MMHG | WEIGHT: 115 LBS | TEMPERATURE: 97.2 F | DIASTOLIC BLOOD PRESSURE: 80 MMHG | OXYGEN SATURATION: 96 % | HEIGHT: 64 IN

## 2025-04-11 DIAGNOSIS — E03.4 HYPOTHYROIDISM DUE TO ACQUIRED ATROPHY OF THYROID: ICD-10-CM

## 2025-04-11 DIAGNOSIS — I10 PRIMARY HYPERTENSION: ICD-10-CM

## 2025-04-11 DIAGNOSIS — I42.9 CARDIOMYOPATHY, UNSPECIFIED TYPE (HCC): ICD-10-CM

## 2025-04-11 DIAGNOSIS — E11.51 TYPE 2 DIABETES MELLITUS WITH DIABETIC PERIPHERAL ANGIOPATHY WITHOUT GANGRENE, WITHOUT LONG-TERM CURRENT USE OF INSULIN (HCC): ICD-10-CM

## 2025-04-11 DIAGNOSIS — Q82.8 ACCESSORY SKIN TAGS: ICD-10-CM

## 2025-04-11 DIAGNOSIS — Z00.00 WELL ADULT EXAM: ICD-10-CM

## 2025-04-11 DIAGNOSIS — Z12.31 ENCOUNTER FOR SCREENING MAMMOGRAM FOR BREAST CANCER: Primary | ICD-10-CM

## 2025-04-11 DIAGNOSIS — F41.9 ANXIETY: ICD-10-CM

## 2025-04-11 DIAGNOSIS — I48.91 ATRIAL FIBRILLATION, UNSPECIFIED TYPE (HCC): ICD-10-CM

## 2025-04-11 DIAGNOSIS — M81.0 AGE-RELATED OSTEOPOROSIS WITHOUT CURRENT PATHOLOGICAL FRACTURE: ICD-10-CM

## 2025-04-11 DIAGNOSIS — E78.2 MIXED HYPERLIPIDEMIA: ICD-10-CM

## 2025-04-11 DIAGNOSIS — N18.31 STAGE 3A CHRONIC KIDNEY DISEASE (HCC): ICD-10-CM

## 2025-04-11 DIAGNOSIS — G62.9 NEUROPATHY: ICD-10-CM

## 2025-04-11 PROCEDURE — G2211 COMPLEX E/M VISIT ADD ON: HCPCS | Performed by: FAMILY MEDICINE

## 2025-04-11 PROCEDURE — 99214 OFFICE O/P EST MOD 30 MIN: CPT | Performed by: FAMILY MEDICINE

## 2025-04-11 PROCEDURE — 96372 THER/PROPH/DIAG INJ SC/IM: CPT | Performed by: FAMILY MEDICINE

## 2025-04-11 PROCEDURE — G0439 PPPS, SUBSEQ VISIT: HCPCS | Performed by: FAMILY MEDICINE

## 2025-04-11 NOTE — ASSESSMENT & PLAN NOTE
Lab Results   Component Value Date    HGBA1C 10.3 (H) 04/08/2025       Orders:  •  Hemoglobin A1C; Future  •  Comprehensive metabolic panel; Future

## 2025-04-11 NOTE — ASSESSMENT & PLAN NOTE
Lab Results   Component Value Date    EGFR 42 04/08/2025    EGFR 45 10/28/2024    EGFR 44 06/27/2024    CREATININE 1.21 04/08/2025    CREATININE 1.16 10/28/2024    CREATININE 1.18 06/27/2024

## 2025-04-11 NOTE — PROGRESS NOTES
Name: Hannah Marinelli      : 1945      MRN: [id number]  Encounter Provider: Arik Vasquez MD  Encounter Date: 2025   Encounter department: [facility]  :  Assessment & Plan  Encounter for screening mammogram for breast cancer    Orders:  •  Mammo screening bilateral w 3d and cad; Future    Type 2 diabetes mellitus with diabetic peripheral angiopathy without gangrene, without long-term current use of insulin (HCC)    Lab Results   Component Value Date    HGBA1C 10.3 (H) 2025       Orders:  •  Hemoglobin A1C; Future  •  Comprehensive metabolic panel; Future    Age-related osteoporosis without current pathological fracture  Patient to continue with weight bearing exercises as much as possible and oral intake of 1200 mg of calcium with 800 IU of Vit D to maximize bone protection. Pt  to continue  with DEXA scan every 2 years to reassess. All qustions regarding this problem answered today to patient satisfaction.   Orders:  •  denosumab (PROLIA) subcutaneous injection 60 mg    Anxiety  Patient to continue utilizing medical therapy as well as counseling sources as applicable to condition. If suicidal thought or fear of suicide attempt, to call 911 and seek help immediately. Medications and therapy reviewed today and all patient  questions answered today.        Atrial fibrillation, unspecified type (HCC)  Continue with anticogulation and rate control of heart rate. Concerns about condition were addressed today.        Cardiomyopathy, unspecified type (HCC)  Patient is stable  and will continue present plan of care and reassess at next routine visit. All questions about this problem from patient were answered today.        Mixed hyperlipidemia    Orders:  •  Lipid Panel with Direct LDL reflex; Future    Primary hypertension  Patient is stable with current anti-hypertensive medicine and continue to follow a low sodium diet and take current medication. All questions about  this condition were answered today.        Hypothyroidism due to acquired atrophy of thyroid  Patient to continue current dose of thyroid medicine and recheck TSH in 6 months   Orders:  •  TSH, 3rd generation with Free T4 reflex; Future    Neuropathy  Patient is stable  and will continue present plan of care and reassess at next routine visit. All questions about this problem from patient were answered today.        Stage 3a chronic kidney disease (HCC)  Lab Results   Component Value Date    EGFR 42 04/08/2025    EGFR 45 10/28/2024    EGFR 44 06/27/2024    CREATININE 1.21 04/08/2025    CREATININE 1.16 10/28/2024    CREATININE 1.18 06/27/2024   Pt to avoid NSAIDs and any IV dyes. Patient to follow up eoither with nephrology or  with us for  further  monitoring of  renal function.        Accessory skin tags    Orders:  •  Ambulatory Referral to General Surgery; Future    Well adult exam               Depression Screening and Follow-up Plan:   Patient assessed for underlying major depression. Brief counseling provided and recommend additional follow-up/re-evaluation next office visit.     Falls Plan of Care: balance, strength, and gait training instructions were provided. Home safety education provided.     Urinary Incontinence Plan of Care: counseling topics discussed: practice Kegel (pelvic floor strengthening) exercises, use restroom every 2 hours, limit alcohol, caffeine, spicy foods, and acidic foods, limiting fluid intake 3-4 hours before bed, weight loss, preventing constipation and limiting fluid intake to 60 oz. per day.     History of Present Illness   80-year-old female today for checkup on multimedical problems patient with type 2 diabetes hypertension hypothyroidism osteoporosis and she is here for Prolia shot today.  Patient had gotten under a lot of stress since her last visit and her sugar had gone up to over 10 she is going to be she had some confusion about getting there with the correct medication  "for her diabetes but now we have her on saxagliptin and she is also taking the glimepiride might.  Patient states she has been following her diet better and she will do better by her next visit which will be in 3 months.  Patient will call when she needs refills I have ordered her lab work for next visit which will include her cholesterol as well as her thyroid which is when she is due for that.Pt also with numerous skin tags and would like to see someone about having them removed.       Review of Systems   Constitutional:  Negative for activity change, appetite change, fatigue and fever.   HENT:  Negative for congestion, ear pain, postnasal drip, rhinorrhea, sinus pressure, sinus pain, sneezing and sore throat.    Eyes:  Negative for pain and redness.   Respiratory:  Negative for apnea, cough, chest tightness, shortness of breath and wheezing.    Cardiovascular:  Negative for chest pain, palpitations and leg swelling.   Gastrointestinal:  Negative for abdominal pain, constipation, diarrhea, nausea and vomiting.   Endocrine: Negative for cold intolerance and heat intolerance.   Genitourinary:  Negative for difficulty urinating, dysuria, frequency, hematuria and urgency.   Musculoskeletal:  Negative for arthralgias, back pain, gait problem and myalgias.   Skin:  Negative for rash.   Neurological:  Negative for dizziness, speech difficulty, weakness, numbness and headaches.   Hematological:  Does not bruise/bleed easily.   Psychiatric/Behavioral:  Negative for agitation, confusion and hallucinations.        Objective   /80 (BP Location: Left arm, Patient Position: Sitting, Cuff Size: Standard)   Pulse (!) 52   Temp (!) 97.2 °F (36.2 °C) (Temporal)   Ht 5' 4\" (1.626 m)   Wt 52.2 kg (115 lb)   SpO2 96%   BMI 19.74 kg/m²      Physical Exam  Constitutional:       Appearance: Normal appearance. She is not ill-appearing.   HENT:      Head: Normocephalic and atraumatic.      Right Ear: Tympanic membrane normal. "      Left Ear: Tympanic membrane normal.      Nose: Nose normal.      Mouth/Throat:      Mouth: Mucous membranes are moist.   Eyes:      Extraocular Movements: Extraocular movements intact.      Conjunctiva/sclera: Conjunctivae normal.      Pupils: Pupils are equal, round, and reactive to light.   Cardiovascular:      Rate and Rhythm: Normal rate and regular rhythm.   Pulmonary:      Effort: Pulmonary effort is normal. No respiratory distress.      Breath sounds: Normal breath sounds. No wheezing.   Abdominal:      General: Bowel sounds are normal.      Palpations: Abdomen is soft.      Tenderness: There is no abdominal tenderness.   Musculoskeletal:         General: No tenderness. Normal range of motion.      Cervical back: Normal range of motion and neck supple.      Right lower leg: No edema.      Left lower leg: No edema.   Skin:     General: Skin is warm and dry.   Neurological:      Mental Status: She is alert and oriented to person, place, and time.   Psychiatric:         Mood and Affect: Mood normal.         Behavior: Behavior normal.         Thought Content: Thought content normal.         Judgment: Judgment normal.

## 2025-04-11 NOTE — ASSESSMENT & PLAN NOTE
Patient to continue with weight bearing exercises as much as possible and oral intake of 1200 mg of calcium with 800 IU of Vit D to maximize bone protection. Pt  to continue  with DEXA scan every 2 years to reassess. All qustions regarding this problem answered today to patient satisfaction.   Orders:  •  denosumab (PROLIA) subcutaneous injection 60 mg

## 2025-04-11 NOTE — ASSESSMENT & PLAN NOTE
Lab Results   Component Value Date    EGFR 42 04/08/2025    EGFR 45 10/28/2024    EGFR 44 06/27/2024    CREATININE 1.21 04/08/2025    CREATININE 1.16 10/28/2024    CREATININE 1.18 06/27/2024   Pt to avoid NSAIDs and any IV dyes. Patient to follow up eoither with nephrology or  with us for  further  monitoring of  renal function.

## 2025-04-11 NOTE — PROGRESS NOTES
Name: Hannah Marinelli      : 1945      MRN: [id number]  Encounter Provider: Arik Vasquez MD  Encounter Date: 2025   Encounter department: [facility]  :  Assessment & Plan  Encounter for screening mammogram for breast cancer    Orders:  •  Mammo screening bilateral w 3d and cad; Future    Type 2 diabetes mellitus with diabetic peripheral angiopathy without gangrene, without long-term current use of insulin (HCC)    Lab Results   Component Value Date    HGBA1C 10.3 (H) 2025       Orders:  •  Hemoglobin A1C; Future  •  Comprehensive metabolic panel; Future    Age-related osteoporosis without current pathological fracture    Orders:  •  denosumab (PROLIA) subcutaneous injection 60 mg    Anxiety         Atrial fibrillation, unspecified type (HCC)         Cardiomyopathy, unspecified type (HCC)         Mixed hyperlipidemia    Orders:  •  Lipid Panel with Direct LDL reflex; Future    Primary hypertension         Hypothyroidism due to acquired atrophy of thyroid    Orders:  •  TSH, 3rd generation with Free T4 reflex; Future    Neuropathy         Stage 3a chronic kidney disease (HCC)  Lab Results   Component Value Date    EGFR 42 2025    EGFR 45 10/28/2024    EGFR 44 2024    CREATININE 1.21 2025    CREATININE 1.16 10/28/2024    CREATININE 1.18 2024            Accessory skin tags    Orders:  •  Ambulatory Referral to General Surgery; Future    Well adult exam            Preventive health issues were discussed with patient, and age appropriate screening tests were ordered as noted in patient's After Visit Summary. Personalized health advice and appropriate referrals for health education or preventive services given if needed, as noted in patient's After Visit Summary.    History of Present Illness     HPI   Patient Care Team:  Arik Vasquez MD as PCP - General (Family Medicine)  Nicolle Reynolds MD    Review of Systems  Medical History Reviewed by  provider this encounter:  Tobacco  Allergies  Meds  Problems  Med Hx  Surg Hx  Fam Hx       Annual Wellness Visit Questionnaire   Hannah is here for her Subsequent Wellness visit. Last Medicare Wellness visit information reviewed, patient interviewed and updates made to the record today.      Health Risk Assessment:   Patient rates overall health as fair. Patient feels that their physical health rating is same. Patient is satisfied with their life. Eyesight was rated as same. Hearing was rated as same. Patient feels that their emotional and mental health rating is slightly worse. Patients states they are never, rarely angry. Patient states they are often unusually tired/fatigued. Pain experienced in the last 7 days has been some. Patient's pain rating has been 4/10. Patient states that she has experienced no weight loss or gain in last 6 months.     Depression Screening:   PHQ-2 Score: 2      Fall Risk Screening:   In the past year, patient has experienced: no history of falling in past year      Urinary Incontinence Screening:   Patient has leaked urine accidently in the last six months.     Home Safety:  Patient does not have trouble with stairs inside or outside of their home. Patient has working smoke alarms and has no working carbon monoxide detector. Home safety hazards include: none.     Nutrition:   Current diet is Regular.     Medications:   Patient is currently taking over-the-counter supplements. OTC medications include: see medication list. Patient is able to manage medications.     Activities of Daily Living (ADLs)/Instrumental Activities of Daily Living (IADLs):   Walk and transfer into and out of bed and chair?: Yes  Dress and groom yourself?: Yes    Bathe or shower yourself?: Yes    Feed yourself? Yes  Do your laundry/housekeeping?: Yes  Manage your money, pay your bills and track your expenses?: Yes  Make your own meals?: Yes    Do your own shopping?: Yes    Previous Hospitalizations:   Any  hospitalizations or ED visits within the last 12 months?: No      Advance Care Planning:   Living will: Yes    Durable POA for healthcare: Yes    Advanced directive: Yes      Preventive Screenings      Cardiovascular Screening:    General: Screening Not Indicated and History Lipid Disorder      Diabetes Screening:     General: Screening Not Indicated and History Diabetes      Breast Cancer Screening:     General: Screening Current      Cervical Cancer Screening:    General: Screening Not Indicated      Osteoporosis Screening:    General: Screening Not Indicated and History Osteoporosis      Lung Cancer Screening:     General: Screening Not Indicated      Hepatitis C Screening:    General: Screening Current    Immunizations:  - Immunizations due: Influenza, Prevnar 20 and Zoster (Shingrix)    Screening, Brief Intervention, and Referral to Treatment (SBIRT)     Screening      AUDIT-C Screenin) How often did you have a drink containing alcohol in the past year? never  2) How many drinks did you have on a typical day when you were drinking in the past year? 0  3) How often did you have 6 or more drinks on one occasion in the past year? never    AUDIT-C Score: 0  Interpretation: Score 0-2 (female): Negative screen for alcohol misuse    Single Item Drug Screening:  How often have you used an illegal drug (including marijuana) or a prescription medication for non-medical reasons in the past year? never    Single Item Drug Screen Score: 0  Interpretation: Negative screen for possible drug use disorder    Social Drivers of Health     Financial Resource Strain: Low Risk  (2024)    Overall Financial Resource Strain (CARDIA)    • Difficulty of Paying Living Expenses: Not hard at all   Food Insecurity: No Food Insecurity (2025)    Hunger Vital Sign    • Worried About Running Out of Food in the Last Year: Never true    • Ran Out of Food in the Last Year: Never true   Transportation Needs: No Transportation Needs  "(4/11/2025)    PRAPARE - Transportation    • Lack of Transportation (Medical): No    • Lack of Transportation (Non-Medical): No   Housing Stability: Low Risk  (4/11/2025)    Housing Stability Vital Sign    • Unable to Pay for Housing in the Last Year: No    • Number of Times Moved in the Last Year: 0    • Homeless in the Last Year: No   Utilities: Not At Risk (4/11/2025)    [facility] Utilities    • Threatened with loss of utilities: No     No results found.    Objective   /80 (BP Location: Left arm, Patient Position: Sitting, Cuff Size: Standard)   Pulse (!) 52   Temp (!) 97.2 °F (36.2 °C) (Temporal)   Ht 5' 4\" (1.626 m)   Wt 52.2 kg (115 lb)   SpO2 96%   BMI 19.74 kg/m²     Physical Exam    "

## 2025-04-14 ENCOUNTER — TELEPHONE (OUTPATIENT)
Age: 80
End: 2025-04-14

## 2025-04-14 LAB
LEFT EYE DIABETIC RETINOPATHY: NORMAL
RIGHT EYE DIABETIC RETINOPATHY: NORMAL
SEVERITY (EYE EXAM): NORMAL

## 2025-04-14 NOTE — TELEPHONE ENCOUNTER
Pt. Is going to stop in this afternoon and  her After Summery Visit.  She had an appointment on Friday and never got it and she would like to have it.   Ty

## 2025-04-16 ENCOUNTER — TELEPHONE (OUTPATIENT)
Age: 80
End: 2025-04-16

## 2025-04-16 DIAGNOSIS — I48.20 CHRONIC A-FIB (HCC): Primary | ICD-10-CM

## 2025-04-16 DIAGNOSIS — I42.8 OTHER CARDIOMYOPATHY (HCC): ICD-10-CM

## 2025-04-16 NOTE — TELEPHONE ENCOUNTER
Tried to call pt to schedule echo, no vm setup.    Referral for echo printed and faxed to testing to schedule pt for a appt

## 2025-04-16 NOTE — TELEPHONE ENCOUNTER
Caller: Hannah Marinelli     Doctor: Dr. Reynolds     Reason for call: Patient is requesting a new order for an echo complete. The previous order has . Patient has an appointment on 25 and would like to have this done before her appointment. Patient requested a call once it is ordered so she can schedule.     Call back#: [phone]

## 2025-04-19 DIAGNOSIS — N32.81 OAB (OVERACTIVE BLADDER): ICD-10-CM

## 2025-04-19 RX ORDER — OXYBUTYNIN CHLORIDE 10 MG/1
20 TABLET, EXTENDED RELEASE ORAL
Qty: 180 TABLET | Refills: 1 | Status: SHIPPED | OUTPATIENT
Start: 2025-04-19

## 2025-04-28 ENCOUNTER — HOSPITAL ENCOUNTER (OUTPATIENT)
Dept: NON INVASIVE DIAGNOSTICS | Facility: CLINIC | Age: 80
Discharge: HOME | End: 2025-04-28
Attending: INTERNAL MEDICINE
Payer: MEDICARE

## 2025-04-28 ENCOUNTER — RESULTS FOLLOW-UP (OUTPATIENT)
Dept: CARDIOLOGY CLINIC | Facility: CLINIC | Age: 80
End: 2025-04-28

## 2025-04-28 VITALS
HEART RATE: 60 BPM | BODY MASS INDEX: 19.63 KG/M2 | DIASTOLIC BLOOD PRESSURE: 80 MMHG | HEIGHT: 64 IN | SYSTOLIC BLOOD PRESSURE: 120 MMHG | WEIGHT: 115 LBS

## 2025-04-28 DIAGNOSIS — I48.20 CHRONIC A-FIB (HCC): ICD-10-CM

## 2025-04-28 DIAGNOSIS — I42.8 OTHER CARDIOMYOPATHY (HCC): ICD-10-CM

## 2025-04-28 LAB
AORTIC ROOT: 3 CM
ASCENDING AORTA: 3.5 CM
BSA FOR ECHO PROCEDURE: 1.55 M2
FRACTIONAL SHORTENING: 41 (ref 28–44)
INTERVENTRICULAR SEPTUM IN DIASTOLE (PARASTERNAL SHORT AXIS VIEW): 0.7 CM
INTERVENTRICULAR SEPTUM: 0.7 CM (ref 0.6–1.1)
LAAS-AP2: 18.3 CM2
LAAS-AP4: 19.5 CM2
LEFT ATRIUM SIZE: 4.2 CM
LEFT ATRIUM VOLUME (MOD BIPLANE): 53 ML
LEFT ATRIUM VOLUME INDEX (MOD BIPLANE): 34.2 ML/M2
LEFT INTERNAL DIMENSION IN SYSTOLE: 1.9 CM (ref 2.1–4)
LEFT VENTRICULAR INTERNAL DIMENSION IN DIASTOLE: 3.2 CM (ref 3.5–6)
LEFT VENTRICULAR POSTERIOR WALL IN END DIASTOLE: 1.1 CM
LEFT VENTRICULAR STROKE VOLUME: 29 ML
LV EF US.2D.A4C+ESTIMATED: 62 %
LVSV (TEICH): 29 ML
RIGHT ATRIUM AREA SYSTOLE A4C: 15.3 CM2
RIGHT VENTRICLE ID DIMENSION: 3.5 CM
SL CV LEFT ATRIUM LENGTH A2C: 5.3 CM
SL CV LV EF: 60
SL CV PED ECHO LEFT VENTRICLE DIASTOLIC VOLUME (MOD BIPLANE) 2D: 40 ML
SL CV PED ECHO LEFT VENTRICLE SYSTOLIC VOLUME (MOD BIPLANE) 2D: 11 ML
TR MAX PG: 34 MMHG
TR PEAK VELOCITY: 2.9 M/S
TRICUSPID ANNULAR PLANE SYSTOLIC EXCURSION: 1.7 CM
TRICUSPID VALVE PEAK REGURGITATION VELOCITY: 2.92 M/S

## 2025-04-28 PROCEDURE — 93306 TTE W/DOPPLER COMPLETE: CPT

## 2025-04-28 PROCEDURE — 93306 TTE W/DOPPLER COMPLETE: CPT | Performed by: INTERNAL MEDICINE

## 2025-04-28 NOTE — TELEPHONE ENCOUNTER
----- Message from Nicolle Reynolds MD sent at 4/28/2025  3:21 PM EDT -----  Please call the patient.  Echocardiogram shows normal ejection fraction with no significant valvular abnormalities.  She has an appointment next month and will discuss it further.  Continue present medications in the interim.

## 2025-05-06 ENCOUNTER — TELEPHONE (OUTPATIENT)
Dept: ADMINISTRATIVE | Facility: OTHER | Age: 80
End: 2025-05-06

## 2025-05-06 NOTE — TELEPHONE ENCOUNTER
----- Message from Terrie AGUILAR sent at 5/5/2025  3:43 PM EDT -----  Regarding: Care Gap Request  05/05/25 3:43 PM    Hello, our patient Hannah Marinelli has had Diabetic Eye Exam completed/performed. Please assist in updating the patient chart by making an External outreach to Dr Wylie facility located in [facility]. The date of service is with in the last year.    Thank you,  Terrie LAYNE Maynard

## 2025-05-06 NOTE — TELEPHONE ENCOUNTER
Upon review of the In Basket request and the patient's chart, initial outreach has been made via fax to facility. Please see Contacts section for details.     x1eye    Thank you  Archana Lay

## 2025-05-06 NOTE — LETTER
Diabetic Eye Exam Form    Date Requested: 25  Patient: Hannah Marinelli  Patient : 1945   Referring Provider: Arik Vasquez MD      DIABETIC Eye Exam Date _______________________________      Type of Exam MUST be documented for Diabetic Eye Exams. Please CHECK ONE.     Retinal Exam       Dilated Retinal Exam       OCT       Optomap-Iris Exam      Fundus Photography       Left Eye - Please check Retinopathy or No Retinopathy        Exam did show retinopathy    Exam did not show retinopathy       Right Eye - Please check Retinopathy or No Retinopathy       Exam did show retinopathy    Exam did not show retinopathy       Comments ___last we have is from  _______________________________________________________    Practice Providing Exam ______________________________________________    Exam Performed By (print name) _______________________________________      Provider Signature ___________________________________________________      These reports are needed for  compliance.    Please fax this completed form and a copy of the Diabetic Eye Exam report to the [facility] Based Department as soon as possible via   Fax [phone], attention Archana: Phone [phone]. Our office is located at [address] [place] [address].     We thank you for your assistance in treating our mutual patient.

## 2025-05-07 ENCOUNTER — OFFICE VISIT (OUTPATIENT)
Dept: CARDIOLOGY CLINIC | Facility: CLINIC | Age: 80
End: 2025-05-07
Payer: MEDICARE

## 2025-05-07 VITALS
BODY MASS INDEX: 20.14 KG/M2 | WEIGHT: 118 LBS | DIASTOLIC BLOOD PRESSURE: 82 MMHG | OXYGEN SATURATION: 98 % | RESPIRATION RATE: 16 BRPM | HEIGHT: 64 IN | HEART RATE: 87 BPM | SYSTOLIC BLOOD PRESSURE: 120 MMHG

## 2025-05-07 DIAGNOSIS — Z79.01 CHRONIC ANTICOAGULATION: ICD-10-CM

## 2025-05-07 DIAGNOSIS — I10 HYPERTENSION, ESSENTIAL: ICD-10-CM

## 2025-05-07 DIAGNOSIS — I48.20 CHRONIC A-FIB (HCC): Primary | ICD-10-CM

## 2025-05-07 DIAGNOSIS — I42.8 OTHER CARDIOMYOPATHY (HCC): ICD-10-CM

## 2025-05-07 PROCEDURE — 93000 ELECTROCARDIOGRAM COMPLETE: CPT | Performed by: INTERNAL MEDICINE

## 2025-05-07 PROCEDURE — 99214 OFFICE O/P EST MOD 30 MIN: CPT | Performed by: INTERNAL MEDICINE

## 2025-05-07 NOTE — PROGRESS NOTES
[facility] [place]  [address]  [place] [address]  Cardiology Follow Up    Hannah Marinelli  1945  [id number]    Assessment & Plan  Chronic a-fib (HCC)  Risks and benefits  and alternatives of anticoagulation to prevent thromboembolic risk from atrial fibrillation discussed at length.  Patient to report any bleeding issues.  Continue rate control.  Other cardiomyopathy (HCC)  Patient has history of cardiomyopathy with recovered ejection fraction.  Echocardiogram done recently showed ejection fraction of 60% with no significant valvular abnormalities. results reviewed with patient.  Hypertension, essential  Importance of salt restriction reinforced.  Continue optimization of antihypertensive therapy.  Chronic anticoagulation  Patient will continue Eliquis 5 mg twice a day.  Patient has been instructed that when she turns 81 years old, her dosage of Eliquis has to be reduced to 2.5 mg twice a day given the her weight is only 118 pounds.    She understands the same.    Symptoms to watch out from cardiac standpoint which would indicate the need for further cardiac evaluation also discussed with patient.  Follow-up in 6 to 8 months or earlier as needed.  Follow-up with primary care physician.         Chief Complaint   Patient presents with    Follow-up       Interval History:   Patient presents for follow-up visit.  Patient denies any history of chest pain shortness of breath.  Patient denies any history of leg edema or orthopnea PND.  No history of presyncope syncope.  Patient states compliance with the present list of medications.  Patient denies any history of bleeding issues.  Patient does have history of cardiomyopathy with recovered ejection fraction.    Patient Active Problem List   Diagnosis    Atrial fibrillation (HCC)    Cardiomyopathy (HCC)    Hypertension    Hyperlipidemia    Chest pain    Pelvic pain    Ankle joint effusion    Anxiety    Arthritis    Type 2  diabetes mellitus with diabetic peripheral angiopathy without gangrene (HCC)    Irritable bowel syndrome    Mitral valvular disorder    Neuropathy    Peripheral vascular disease (HCC)    Reflex sympathetic dystrophy of other specified site    Hypothyroidism    Age-related osteoporosis without current pathological fracture    Stage 3a chronic kidney disease (HCC)    Protein-calorie malnutrition, unspecified severity (HCC)     Past Medical History:   Diagnosis Date    Anxiety disorder     Atrial fibrillation (HCC)     Cardiac arrhythmia     Cardiomyopathy (HCC)     Colitis     Diabetes mellitus (HCC)     Dry eyes     Dry mouth     HTN (hypertension)     Hyperlipidemia     IBS (irritable bowel syndrome)     MVP (mitral valve prolapse)     Osteoporosis     SOB (shortness of breath)     Tachycardia      Social History     Socioeconomic History    Marital status: /Civil Union     Spouse name: Not on file    Number of children: Not on file    Years of education: Not on file    Highest education level: Not on file   Occupational History    Occupation: Retired   Tobacco Use    Smoking status: Never     Passive exposure: Never    Smokeless tobacco: Never   Vaping Use    Vaping status: Never Used   Substance and Sexual Activity    Alcohol use: No    Drug use: Never    Sexual activity: Not Currently   Other Topics Concern    Not on file   Social History Narrative    · Most recent tobacco use screenin2019      · Do you currently or have you served in the [employer]:   No      · Were you activated, into active duty, as a member of the [employer] or as a Reservist:   No      · Sexual orientation:   Heterosexual      · Diet:   Diabetic       · Caffeine intake:   Occasional      · Seat belts used routinely:   Yes      · Sunscreen used routinely:   No      · Smoke alarm in home:   Yes      · Advance directive:   Yes      · Salt Intake:   not much      Social Drivers of Health     Financial Resource Strain:  Low Risk  (1/24/2024)    Overall Financial Resource Strain (CARDIA)     Difficulty of Paying Living Expenses: Not hard at all   Food Insecurity: No Food Insecurity (4/11/2025)    Hunger Vital Sign     Worried About Running Out of Food in the Last Year: Never true     Ran Out of Food in the Last Year: Never true   Transportation Needs: No Transportation Needs (4/11/2025)    PRAPARE - Transportation     Lack of Transportation (Medical): No     Lack of Transportation (Non-Medical): No   Physical Activity: Not on file   Stress: Not on file   Social Connections: Not on file   Intimate Partner Violence: Not on file   Housing Stability: Low Risk  (4/11/2025)    Housing Stability Vital Sign     Unable to Pay for Housing in the Last Year: No     Number of Times Moved in the Last Year: 0     Homeless in the Last Year: No      Family History   Problem Relation Age of Onset    Heart attack Mother     Diabetes Mother     Heart attack Father     Colon cancer Sister         unknown age    Stomach cancer Sister     Stomach cancer Sister 55    No Known Problems Sister     No Known Problems Sister     No Known Problems Sister     No Known Problems Maternal Grandmother     No Known Problems Paternal Grandmother     No Known Problems Maternal Aunt     No Known Problems Paternal Aunt     No Known Problems Paternal Aunt     Breast cancer Neg Hx      Past Surgical History:   Procedure Laterality Date    CATARACT EXTRACTION Bilateral     DILATION AND CURETTAGE OF UTERUS      x2    WRIST SURGERY         Current Outpatient Medications:     Cholecalciferol (VITAMIN D3 PO), Vitamin D3, Disp: , Rfl:     cyclobenzaprine (FLEXERIL) 10 mg tablet, TAKE 1 TABLET BY MOUTH THREE TIMES A DAY AS NEEDED FOR MUSCLE SPASMS, Disp: 270 tablet, Rfl: 1    denosumab (PROLIA) 60 mg/mL, Inject 60 mg under the skin every 6 (six) months, Disp: , Rfl:     dicyclomine (BENTYL) 10 mg capsule, TAKE 1 CAPSULE BY MOUTH EVERY 6 HOURS AS NEEDED (NAUSEA), Disp: 360 capsule,  Rfl: 1    digoxin (LANOXIN) 0.125 mg tablet, TAKE 1 TABLET BY MOUTH EVERY DAY, Disp: 90 tablet, Rfl: 3    Eliquis 5 MG, TAKE 1 TABLET BY MOUTH TWICE A DAY, Disp: 180 tablet, Rfl: 3    ezetimibe-simvastatin (VYTORIN) 10-20 mg per tablet, TAKE 1 TABLET BY MOUTH EVERYDAY AT BEDTIME, Disp: 90 tablet, Rfl: 1    glimepiride (AMARYL) 4 mg tablet, Take 1 tablet (4 mg total) by mouth daily with breakfast, Disp: 100 tablet, Rfl: 3    levocetirizine (XYZAL) 5 MG tablet, TAKE 1 TABLET BY MOUTH EVERY DAY IN THE EVENING, Disp: 90 tablet, Rfl: 1    levothyroxine 75 mcg tablet, TAKE 1 TABLET (75 MCG TOTAL) BY MOUTH DAILY IN THE EARLY MORNING, Disp: 90 tablet, Rfl: 1    lisinopril (ZESTRIL) 2.5 mg tablet, TAKE 1 TABLET BY MOUTH EVERY DAY, Disp: 90 tablet, Rfl: 1    metoprolol tartrate (LOPRESSOR) 50 mg tablet, TAKE 1 TABLET BY MOUTH EVERY 12 HOURS, Disp: 180 tablet, Rfl: 1    Multiple Vitamins-Minerals (CENTRUM SILVER PO), Take 1 tablet by mouth daily, Disp: , Rfl:     oxybutynin (DITROPAN-XL) 10 MG 24 hr tablet, TAKE 2 TABLETS (20 MG TOTAL) BY MOUTH DAILY AT BEDTIME, Disp: 180 tablet, Rfl: 1    pantoprazole (PROTONIX) 40 mg tablet, TAKE 1 TABLET BY MOUTH TWICE A DAY, Disp: 180 tablet, Rfl: 1    saxagliptin (ONGLYZA) 5 MG tablet, Take 1 tablet (5 mg total) by mouth daily, Disp: 30 tablet, Rfl: 5    senna (SENOKOT) 8.6 MG tablet, Take 1 tablet by mouth daily, Disp: , Rfl:     spironolactone (ALDACTONE) 25 mg tablet, TAKE 1 TABLET BY MOUTH EVERY DAY, Disp: 90 tablet, Rfl: 1  Allergies   Allergen Reactions    Aspirin Other (See Comments)     aspirin    Nsaids Other (See Comments)     Non-steroidal anti-inflammatory agent (product)    Butoconazole     Moxifloxacin     Neomycin-Bacitracin Zn-Polymyx     Smz-Tmp Ds [Sulfamethoxazole-Trimethoprim]     Sulfa Antibiotics     Telithromycin Diarrhea       Labs:  Telephone on 05/06/2025   Component Date Value    Severity 04/14/2025 Normal     Right Eye Diabetic Retin* 04/14/2025 None     Left  Eye Diabetic Retino* 04/14/2025 None    Hospital Outpatient Visit on 04/28/2025   Component Date Value    Triscuspid Valve Regurgi* 04/28/2025 34.0     RAA A4C 04/28/2025 15.3     LA Volume Index (BP) 04/28/2025 34.2     TR Peak Leo 04/28/2025 2.9     RVID d 04/28/2025 3.5     A4C EF 04/28/2025 62     Tricuspid valve peak reg* 04/28/2025 2.92     Left ventricular stroke * 04/28/2025 29.00     IVSd 04/28/2025 0.70     Tricuspid annular plane * 04/28/2025 1.70     Ao root 04/28/2025 3.00     LVPWd 04/28/2025 1.10     LA size 04/28/2025 4.2     Asc Ao 04/28/2025 3.5     LA volume (BP) 04/28/2025 53     FS 04/28/2025 41     LVIDS 04/28/2025 1.90     IVS 04/28/2025 0.7     LVIDd 04/28/2025 3.20     LA length (A2C) 04/28/2025 5.30     LEFT VENTRICLE SYSTOLIC * 04/28/2025 11     LV DIASTOLIC VOLUME (MOD* 04/28/2025 40     Left Atrium Area-systoli* 04/28/2025 19.5     Left Atrium Area-systoli* 04/28/2025 18.3     LVSV, 2D 04/28/2025 29     BSA 04/28/2025 1.55     LV EF 04/28/2025 60    Appointment on 04/08/2025   Component Date Value    Hemoglobin A1C 04/08/2025 10.3 (H)     EAG 04/08/2025 249     Sodium 04/08/2025 139     Potassium 04/08/2025 4.9     Chloride 04/08/2025 99     CO2 04/08/2025 26     ANION GAP 04/08/2025 14 (H)     BUN 04/08/2025 26 (H)     Creatinine 04/08/2025 1.21     Glucose, Fasting 04/08/2025 222 (H)     Calcium 04/08/2025 10.0     AST 04/08/2025 21     ALT 04/08/2025 14     Alkaline Phosphatase 04/08/2025 103     Total Protein 04/08/2025 8.1     Albumin 04/08/2025 4.9     Total Bilirubin 04/08/2025 0.72     eGFR 04/08/2025 42     Cholesterol 04/08/2025 161     Triglycerides 04/08/2025 214 (H)     HDL, Direct 04/08/2025 39 (L)     LDL Calculated 04/08/2025 79     TSH 3RD GENERATON 04/08/2025 0.414 (L)     Free T4 04/08/2025 1.06      Imaging: Echo complete w/ contrast if indicated  Result Date: 4/28/2025  Narrative:   Left Ventricle: Left ventricular cavity size is normal. Wall thickness is normal. The  "left ventricular ejection fraction is 60%. Systolic function is normal. Wall motion is normal.   Left Atrium: The atrium is mildly dilated.   Right Atrium: The atrium is mildly dilated.   Tricuspid Valve: There is mild regurgitation. The right ventricular systolic pressure is mildly elevated.   Compared to the previous echo 11/20/2020 the EF then was 50%.       Review of Systems:  Review of Systems  REVIEW OF SYSTEMS:  Constitutional:  Denies fever or chills   Eyes:  Denies change in visual acuity   HENT:  Denies nasal congestion or sore throat   Respiratory:  Denies cough or shortness of breath   Cardiovascular:  Denies chest pain or edema   GI:  Denies abdominal pain, nausea, vomiting, bloody stools or diarrhea   :  Denies dysuria, frequency, difficulty in micturition and nocturia  Musculoskeletal:  Denies back pain or joint pain   Neurologic:  Denies headache, focal weakness or sensory changes   Endocrine:  Denies polyuria or polydipsia   Lymphatic:  Denies swollen glands   Psychiatric:  anxiety    Physical Exam:    /82 (BP Location: Right arm, Patient Position: Sitting, Cuff Size: Standard)   Pulse 87   Resp 16   Ht 5' 4\" (1.626 m)   Wt 53.5 kg (118 lb)   SpO2 98%   BMI 20.25 kg/m²     Physical Exam  PHYSICAL EXAM:  General:  Patient is not in acute distress   Head: Normocephalic, Atraumatic.  HEENT:  Both pupils normal-size atraumatic, normocephalic, nonicteric  Neck:  JVP not raised. Trachea central. No carotid bruit  Respiratory:  normal breath sounds no crackles. no rhonchi  Cardiovascular: Irregularly irregular  GI:  Abdomen soft nontender. No organomegaly.   Lymphatic:  No cervical or inguinal lymphadenopathy  Neurologic:  Patient is awake alert, oriented . Grossly nonfocal  Extremities no edema      "

## 2025-05-07 NOTE — TELEPHONE ENCOUNTER
Upon review of the In Basket request we were able to locate, review, and update the patient chart as requested for Diabetic Eye Exam.    Any additional questions or concerns should be emailed to the Practice Liaisons via the appropriate education email address, please do not reply via In Basket.    Thank you  Archana Lay   [facility]

## 2025-05-08 NOTE — ASSESSMENT & PLAN NOTE
Patient has history of cardiomyopathy with recovered ejection fraction.  Echocardiogram done recently showed ejection fraction of 60% with no significant valvular abnormalities. results reviewed with patient.

## 2025-05-18 DIAGNOSIS — L65.9 ALOPECIA: ICD-10-CM

## 2025-05-18 RX ORDER — SPIRONOLACTONE 25 MG/1
25 TABLET ORAL DAILY
Qty: 90 TABLET | Refills: 1 | Status: SHIPPED | OUTPATIENT
Start: 2025-05-18

## 2025-05-31 DIAGNOSIS — I48.20 CHRONIC A-FIB (HCC): ICD-10-CM

## 2025-06-02 ENCOUNTER — TELEPHONE (OUTPATIENT)
Age: 80
End: 2025-06-02

## 2025-06-02 NOTE — TELEPHONE ENCOUNTER
Patient called in and would like to know if she can take a supplement called Viactiv 650 mg which contains Vitamin K and Vitamin D along with her other medications especially the Eliquis. It would be 100mg more than what Dr. Vasquez wanted her to take for vitamin D. Please advise. Thank you.

## 2025-06-03 DIAGNOSIS — I48.20 CHRONIC A-FIB (HCC): ICD-10-CM

## 2025-06-03 DIAGNOSIS — I10 HYPERTENSION, ESSENTIAL: ICD-10-CM

## 2025-06-03 DIAGNOSIS — E11.51 TYPE 2 DIABETES MELLITUS WITH DIABETIC PERIPHERAL ANGIOPATHY WITHOUT GANGRENE, WITHOUT LONG-TERM CURRENT USE OF INSULIN (HCC): ICD-10-CM

## 2025-06-03 DIAGNOSIS — E03.9 HYPOTHYROIDISM, UNSPECIFIED TYPE: ICD-10-CM

## 2025-06-03 DIAGNOSIS — R11.2 NAUSEA AND VOMITING: ICD-10-CM

## 2025-06-03 RX ORDER — METOPROLOL TARTRATE 50 MG
50 TABLET ORAL 2 TIMES DAILY
Qty: 180 TABLET | Refills: 1 | Status: SHIPPED | OUTPATIENT
Start: 2025-06-03

## 2025-06-03 RX ORDER — LISINOPRIL 2.5 MG/1
2.5 TABLET ORAL DAILY
Qty: 90 TABLET | Refills: 1 | Status: SHIPPED | OUTPATIENT
Start: 2025-06-03

## 2025-06-03 NOTE — TELEPHONE ENCOUNTER
Viactiv is OK for Vit K only effects coumadin and not her Eliquis. Taking 100mg more of Vit D is not a problem.

## 2025-06-04 RX ORDER — LEVOTHYROXINE SODIUM 75 UG/1
75 TABLET ORAL
Qty: 90 TABLET | Refills: 0 | Status: SHIPPED | OUTPATIENT
Start: 2025-06-04

## 2025-06-04 RX ORDER — GLIMEPIRIDE 4 MG/1
4 TABLET ORAL
Qty: 90 TABLET | Refills: 0 | Status: SHIPPED | OUTPATIENT
Start: 2025-06-04

## 2025-06-04 RX ORDER — DICYCLOMINE HYDROCHLORIDE 10 MG/1
CAPSULE ORAL
Qty: 360 CAPSULE | Refills: 0 | Status: SHIPPED | OUTPATIENT
Start: 2025-06-04

## 2025-06-04 NOTE — TELEPHONE ENCOUNTER
Patient called back because she had missed a call from the office regarding medication. Patient was advice of Dr. Vasquez medical advice.

## 2025-06-05 DIAGNOSIS — I48.20 CHRONIC A-FIB (HCC): ICD-10-CM

## 2025-06-06 RX ORDER — DIGOXIN 125 MCG
125 TABLET ORAL DAILY
Qty: 90 TABLET | Refills: 3 | Status: SHIPPED | OUTPATIENT
Start: 2025-06-06

## 2025-06-06 RX ORDER — APIXABAN 5 MG/1
5 TABLET, FILM COATED ORAL 2 TIMES DAILY
Qty: 60 TABLET | Refills: 0 | Status: SHIPPED | OUTPATIENT
Start: 2025-06-06 | End: 2025-06-06 | Stop reason: SDUPTHER

## 2025-06-06 NOTE — TELEPHONE ENCOUNTER
Transmission to pharmacy failed twice for the apixaban. Please call it into the [id number] - [place], [place] - [address]

## 2025-06-06 NOTE — TELEPHONE ENCOUNTER
Patient needs updated blood work. Please place orders. A courtesy refill was provided.   HGB in normal range and within 360 days   PLT in normal range and within 360 days   HCT in normal range and within 360 days

## 2025-07-24 ENCOUNTER — OFFICE VISIT (OUTPATIENT)
Dept: URGENT CARE | Facility: CLINIC | Age: 80
End: 2025-07-24
Payer: MEDICARE

## 2025-07-24 VITALS
SYSTOLIC BLOOD PRESSURE: 146 MMHG | RESPIRATION RATE: 18 BRPM | DIASTOLIC BLOOD PRESSURE: 78 MMHG | OXYGEN SATURATION: 97 % | TEMPERATURE: 97.6 F | HEART RATE: 91 BPM

## 2025-07-24 DIAGNOSIS — Z23 ENCOUNTER FOR IMMUNIZATION: ICD-10-CM

## 2025-07-24 DIAGNOSIS — Z97.2: ICD-10-CM

## 2025-07-24 DIAGNOSIS — S61.012A LACERATION OF LEFT THUMB WITHOUT FOREIGN BODY WITHOUT DAMAGE TO NAIL, INITIAL ENCOUNTER: Primary | ICD-10-CM

## 2025-07-24 PROCEDURE — G0463 HOSPITAL OUTPT CLINIC VISIT: HCPCS | Performed by: PHYSICIAN ASSISTANT

## 2025-07-24 PROCEDURE — 12002 RPR S/N/AX/GEN/TRNK2.6-7.5CM: CPT | Performed by: PHYSICIAN ASSISTANT

## 2025-07-24 PROCEDURE — 90715 TDAP VACCINE 7 YRS/> IM: CPT

## 2025-07-24 PROCEDURE — 99214 OFFICE O/P EST MOD 30 MIN: CPT | Performed by: PHYSICIAN ASSISTANT

## 2025-07-24 RX ORDER — CEPHALEXIN 500 MG/1
500 CAPSULE ORAL 2 TIMES DAILY
Qty: 14 CAPSULE | Refills: 0 | Status: SHIPPED | OUTPATIENT
Start: 2025-07-24 | End: 2025-07-31

## 2025-07-24 RX ORDER — LIDOCAINE HYDROCHLORIDE 10 MG/ML
5 INJECTION, SOLUTION EPIDURAL; INFILTRATION; INTRACAUDAL; PERINEURAL ONCE
Status: COMPLETED | OUTPATIENT
Start: 2025-07-24 | End: 2025-07-24

## 2025-07-24 RX ADMIN — LIDOCAINE HYDROCHLORIDE 5 ML: 10 INJECTION, SOLUTION EPIDURAL; INFILTRATION; INTRACAUDAL; PERINEURAL at 14:39

## 2025-07-24 NOTE — PATIENT INSTRUCTIONS
Rx Keflex, take as directed.  Return to Urgent Care or your PCP for suture removal any day between 8/4-8/7.  Keep wound dry and do not soak for the next 3 days.   Motrin for discomfort.  Follow with with PCP as needed.

## 2025-07-24 NOTE — PROGRESS NOTES
[facility]  Name: Hannah Marinelli      : 1945      MRN: [id number]  Encounter Provider: Pavan Herrmann PA-C  Encounter Date: 2025   Encounter department: [facility] [place]  :  Assessment & Plan  Laceration of left thumb without foreign body without damage to nail, initial encounter    Orders:    Tdap Vaccine greater than or equal to 6yo    lidocaine (PF) (XYLOCAINE-MPF) 1 % injection 5 mL    cephalexin (KEFLEX) 500 mg capsule; Take 1 capsule (500 mg total) by mouth 2 (two) times a day for 7 days    Encounter for immunization    Orders:    Tdap Vaccine greater than or equal to 6yo    Presence of dental bridge    Orders:    Ambulatory Referral to Dentistry; Future        Patient Instructions  Follow up with PCP in 3-5 days.  Proceed to  ER if symptoms worsen.    If tests are performed, our office will contact you with results only if changes need to made to the care plan discussed with you at the visit. You can review your full results on [facility].    Chief Complaint:   Chief Complaint   Patient presents with    Hand Laceration     Washing glass and cut hand. In need of tdap     History of Present Illness   Patient is a 79 yo female who presents with a laceration on her R dorsal thumb after washing glasses and the glass broke while her hand was in it. She notes the thumb was bleeding a lot, but she was able to control with pressure and a paper towel. Denies numbness/tingling distally.          Review of Systems   Constitutional: Negative.    HENT: Negative.     Respiratory: Negative.     Cardiovascular: Negative.    Gastrointestinal: Negative.    Skin:  Positive for wound.   Neurological: Negative.      Past Medical History   Past Medical History[1]  Past Surgical History[2]  Family History[3]  she reports that she has never smoked. She has never been exposed to tobacco smoke. She has never used smokeless tobacco. She reports that she does not drink alcohol and  does not use drugs.  Current Outpatient Medications   Medication Instructions    apixaban (ELIQUIS) 5 mg, Oral, 2 times daily    cephalexin (KEFLEX) 500 mg, Oral, 2 times daily    Cholecalciferol (VITAMIN D3 PO)     cyclobenzaprine (FLEXERIL) 10 mg, Oral, 3 times daily PRN    denosumab (PROLIA) 60 mg, Subcutaneous, Every 6 months    dicyclomine (BENTYL) 10 mg capsule TAKE 1 CAPSULE BY MOUTH EVERY 6 HOURS AS NEEDED (NAUSEA)    digoxin (LANOXIN) 125 mcg, Oral, Daily    ezetimibe-simvastatin (VYTORIN) 10-20 mg per tablet 1 tablet, Oral, Daily at bedtime,       glimepiride (AMARYL) 4 mg, Oral, Daily with breakfast    levocetirizine (XYZAL) 5 mg, Oral, Every evening    levothyroxine 75 mcg, Oral, Daily (early morning)    lisinopril (ZESTRIL) 2.5 mg, Oral, Daily    metoprolol tartrate (LOPRESSOR) 50 mg, Oral, 2 times daily    Multiple Vitamins-Minerals (CENTRUM SILVER PO) 1 tablet, Oral, Daily    oxybutynin (DITROPAN-XL) 20 mg, Oral, Daily at bedtime    pantoprazole (PROTONIX) 40 mg tablet TAKE 1 TABLET BY MOUTH TWICE A DAY    saxagliptin (ONGLYZA) 5 mg, Oral, Daily    senna (SENOKOT) 8.6 MG tablet 1 tablet, Oral, Daily    spironolactone (ALDACTONE) 25 mg, Oral, Daily   Allergies[4]     Objective   /78   Pulse 91   Temp 97.6 °F (36.4 °C)   Resp 18   SpO2 97%      Physical Exam  Vitals reviewed.   Constitutional:       Appearance: Normal appearance.     Cardiovascular:      Rate and Rhythm: Normal rate and regular rhythm.      Pulses: Normal pulses.      Heart sounds: Normal heart sounds.   Pulmonary:      Effort: Pulmonary effort is normal.      Breath sounds: Normal breath sounds.     Skin:     General: Skin is warm and dry.      Capillary Refill: Capillary refill takes less than 2 seconds.          Neurological:      General: No focal deficit present.      Mental Status: She is alert and oriented to person, place, and time.         Universal Protocol:  procedure performed by consultantConsent: Verbal consent  "obtained  Risks and benefits: risks, benefits and alternatives were discussed  Consent given by: patient  Patient identity confirmed: verbally with patient  Laceration repair    Date/Time: 7/24/2025 2:30 PM    Performed by: Pavan Herrmann PA-C  Authorized by: Pavan Herrmann PA-C  Body area: upper extremity  Location details: right thumb  Laceration length: 3.5 cm  Tendon involvement: none  Nerve involvement: none  Vascular damage: no  Anesthesia: digital block    Anesthesia:  Local Anesthetic: lidocaine 1% without epinephrine  Anesthetic total: 4 mL    Wound Dehiscence:  Superficial Wound Dehiscence: simple closure      Procedure Details:  Preparation: Patient was prepped and draped in the usual sterile fashion.  Irrigation solution: saline  Irrigation method: syringe  Amount of cleaning: standard  Skin closure: 4-0 nylon  Number of sutures: 5  Technique: simple  Approximation: close  Approximation difficulty: simple  Dressing: antibiotic ointment and gauze roll  Patient tolerance: patient tolerated the procedure well with no immediate complications            Portions of the record may have been created with voice recognition software.  Occasional wrong word or \"sound a like\" substitutions may have occurred due to the inherent limitations of voice recognition software.  Read the chart carefully and recognize, using context, where substitutions have occurred.       [1]   Past Medical History:  Diagnosis Date    Anxiety disorder     Atrial fibrillation (HCC)     Cardiac arrhythmia     Cardiomyopathy (HCC)     Colitis     Diabetes mellitus (HCC)     Dry eyes     Dry mouth     HTN (hypertension)     Hyperlipidemia     IBS (irritable bowel syndrome)     MVP (mitral valve prolapse)     Osteoporosis     SOB (shortness of breath)     Tachycardia    [2]   Past Surgical History:  Procedure Laterality Date    CATARACT EXTRACTION Bilateral     DILATION AND CURETTAGE OF UTERUS      x2    WRIST SURGERY     [3]   Family " History  Problem Relation Name Age of Onset    Heart attack Mother      Diabetes Mother      Heart attack Father      Colon cancer Sister          unknown age    Stomach cancer Sister      Stomach cancer Sister  55    No Known Problems Sister      No Known Problems Sister      No Known Problems Sister      No Known Problems Maternal Grandmother      No Known Problems Paternal Grandmother      No Known Problems Maternal Aunt      No Known Problems Paternal Aunt      No Known Problems Paternal Aunt      Breast cancer Neg Hx     [4]   Allergies  Allergen Reactions    Aspirin Other (See Comments)     aspirin    Nsaids Other (See Comments)     Non-steroidal anti-inflammatory agent (product)    Butoconazole     Moxifloxacin     Neomycin-Bacitracin Zn-Polymyx     Smz-Tmp Ds [Sulfamethoxazole-Trimethoprim]     Sulfa Antibiotics     Telithromycin Diarrhea

## 2025-07-25 ENCOUNTER — DOCUMENTATION (OUTPATIENT)
Dept: ADMINISTRATIVE | Facility: OTHER | Age: 80
End: 2025-07-25

## 2025-08-05 ENCOUNTER — OFFICE VISIT (OUTPATIENT)
Dept: URGENT CARE | Facility: CLINIC | Age: 80
End: 2025-08-05
Payer: MEDICARE

## 2025-08-05 VITALS
SYSTOLIC BLOOD PRESSURE: 144 MMHG | HEART RATE: 85 BPM | TEMPERATURE: 97.7 F | DIASTOLIC BLOOD PRESSURE: 74 MMHG | RESPIRATION RATE: 18 BRPM

## 2025-08-05 DIAGNOSIS — S61.012D LACERATION OF LEFT THUMB WITHOUT FOREIGN BODY WITHOUT DAMAGE TO NAIL, SUBSEQUENT ENCOUNTER: Primary | ICD-10-CM

## 2025-08-05 PROCEDURE — G0463 HOSPITAL OUTPT CLINIC VISIT: HCPCS | Performed by: FAMILY MEDICINE

## 2025-08-05 PROCEDURE — 99213 OFFICE O/P EST LOW 20 MIN: CPT | Performed by: FAMILY MEDICINE

## 2025-08-06 ENCOUNTER — DOCUMENTATION (OUTPATIENT)
Dept: ADMINISTRATIVE | Facility: OTHER | Age: 80
End: 2025-08-06

## 2025-08-20 DIAGNOSIS — I48.20 CHRONIC A-FIB (HCC): ICD-10-CM
